# Patient Record
Sex: FEMALE | Race: WHITE
[De-identification: names, ages, dates, MRNs, and addresses within clinical notes are randomized per-mention and may not be internally consistent; named-entity substitution may affect disease eponyms.]

---

## 2017-01-31 ENCOUNTER — HOSPITAL ENCOUNTER (INPATIENT)
Dept: HOSPITAL 10 - MS2 | Age: 82
LOS: 9 days | Discharge: NURSING, REHAB OR LONG-TERM CARE | DRG: 378 | End: 2017-02-09
Attending: INTERNAL MEDICINE | Admitting: INTERNAL MEDICINE
Payer: MEDICARE

## 2017-01-31 VITALS — DIASTOLIC BLOOD PRESSURE: 71 MMHG | RESPIRATION RATE: 18 BRPM | SYSTOLIC BLOOD PRESSURE: 167 MMHG

## 2017-01-31 VITALS
HEIGHT: 64 IN | WEIGHT: 117.95 LBS | BODY MASS INDEX: 20.14 KG/M2 | WEIGHT: 117.95 LBS | BODY MASS INDEX: 20.14 KG/M2 | HEIGHT: 64 IN

## 2017-01-31 VITALS — RESPIRATION RATE: 18 BRPM | SYSTOLIC BLOOD PRESSURE: 130 MMHG | DIASTOLIC BLOOD PRESSURE: 61 MMHG

## 2017-01-31 DIAGNOSIS — E87.6: ICD-10-CM

## 2017-01-31 DIAGNOSIS — J45.909: ICD-10-CM

## 2017-01-31 DIAGNOSIS — M48.56XA: ICD-10-CM

## 2017-01-31 DIAGNOSIS — Z79.01: ICD-10-CM

## 2017-01-31 DIAGNOSIS — K57.21: Primary | ICD-10-CM

## 2017-01-31 DIAGNOSIS — H04.129: ICD-10-CM

## 2017-01-31 DIAGNOSIS — K21.9: ICD-10-CM

## 2017-01-31 DIAGNOSIS — Z95.0: ICD-10-CM

## 2017-01-31 DIAGNOSIS — D64.9: ICD-10-CM

## 2017-01-31 LAB
ADD UMIC: YES
ALBUMIN SERPL-MCNC: 2.9 G/DL (ref 3.3–4.9)
ALBUMIN/GLOB SERPL: 1.03 {RATIO}
ALP SERPL-CCNC: 98 IU/L (ref 42–121)
ALT SERPL-CCNC: 26 IU/L (ref 13–69)
ANION GAP SERPL CALC-SCNC: 12 MMOL/L (ref 8–16)
APTT BLD: 31 SEC (ref 25–35)
AST SERPL-CCNC: 18 IU/L (ref 15–46)
BASOPHILS # BLD AUTO: 0 10^3/UL (ref 0–0.1)
BASOPHILS NFR BLD: 0.5 % (ref 0–2)
BILIRUB DIRECT SERPL-MCNC: 0 MG/DL (ref 0–0.2)
BILIRUB SERPL-MCNC: 0.1 MG/DL (ref 0.2–1.3)
BUN SERPL-MCNC: 8 MG/DL (ref 7–20)
CALCIUM SERPL-MCNC: 9.5 MG/DL (ref 8.4–10.2)
CHLORIDE SERPL-SCNC: 107 MMOL/L (ref 97–110)
CO2 SERPL-SCNC: 28 MMOL/L (ref 21–31)
COLOR UR: (no result)
CONDITION: 1
CREAT SERPL-MCNC: 0.5 MG/DL (ref 0.44–1)
EOSINOPHIL # BLD: 0.2 10^3/UL (ref 0–0.5)
EOSINOPHIL NFR BLD: 2.7 % (ref 0–7)
ERYTHROCYTE [DISTWIDTH] IN BLOOD BY AUTOMATED COUNT: 16.2 % (ref 11.5–14.5)
GLOBULIN SER-MCNC: 2.8 G/DL (ref 1.3–3.2)
GLUCOSE SERPL-MCNC: 73 MG/DL (ref 70–220)
GLUCOSE UR STRIP-MCNC: NEGATIVE %
HCT VFR BLD CALC: 33 % (ref 37–47)
HGB BLD-MCNC: 10.9 G/DL (ref 12–16)
INR PPP: 1.29
KETONES UR STRIP.AUTO-MCNC: 15 MG/DL
LH ANALYZER COMMENTS: 1
LYMPHOCYTES # BLD AUTO: 2.1 10^3/UL (ref 0.8–2.9)
LYMPHOCYTES NFR BLD AUTO: 35.5 % (ref 15–51)
MCH RBC QN AUTO: 29.9 PG (ref 29–33)
MCHC RBC AUTO-ENTMCNC: 32.9 G/DL (ref 32–37)
MCV RBC AUTO: 91 FL (ref 82–101)
MONOCYTES # BLD: 0.5 10^3/UL (ref 0.3–0.9)
MONOCYTES NFR BLD: 8.7 % (ref 0–11)
NEUTROPHILS # BLD: 3.1 10^3/UL (ref 1.6–7.5)
NEUTROPHILS NFR BLD AUTO: 52.6 % (ref 39–77)
NITRITE UR QL STRIP.AUTO: NEGATIVE
NRBC # BLD MANUAL: 0 10^3/UL (ref 0–0)
NRBC BLD QL: 0 /100WBC (ref 0–0)
PLATELET # BLD: 161 10^3/UL (ref 140–440)
PMV BLD AUTO: 8.4 FL (ref 7.4–10.4)
POTASSIUM SERPL-SCNC: 4 MMOL/L (ref 3.5–5.1)
PROT SERPL-MCNC: 5.7 G/DL (ref 6.1–8.1)
PROTHROMBIN TIME: 16.2 SEC (ref 12.2–14.2)
PT RATIO: 1.3
RBC # BLD AUTO: 3.63 10^6/UL (ref 4.2–5.4)
RBC # UR AUTO: NEGATIVE /UL
RBC #/AREA URNS HPF: (no result) /HPF
SODIUM SERPL-SCNC: 143 MMOL/L (ref 135–144)
SQUAMOUS #/AREA URNS HPF: (no result) /[HPF]
URINE BILIRUBIN (DIP): NEGATIVE
URINE TOTAL PROTEIN (DIP): NEGATIVE
UROBILINOGEN UR STRIP-ACNC: (no result) (ref 0.1–1)
WBC # BLD AUTO: 5.9 10^3/UL (ref 4.8–10.8)
WBC # BLD: 5.9 10^3/UL (ref 4.8–10.8)
WBC # UR STRIP: (no result) /UL

## 2017-01-31 PROCEDURE — 97162 PT EVAL MOD COMPLEX 30 MIN: CPT

## 2017-01-31 PROCEDURE — 74176 CT ABD & PELVIS W/O CONTRAST: CPT

## 2017-01-31 PROCEDURE — 71010: CPT

## 2017-01-31 PROCEDURE — 80048 BASIC METABOLIC PNL TOTAL CA: CPT

## 2017-01-31 PROCEDURE — 97116 GAIT TRAINING THERAPY: CPT

## 2017-01-31 PROCEDURE — 85025 COMPLETE CBC W/AUTO DIFF WBC: CPT

## 2017-01-31 PROCEDURE — 82962 GLUCOSE BLOOD TEST: CPT

## 2017-01-31 PROCEDURE — 97530 THERAPEUTIC ACTIVITIES: CPT

## 2017-01-31 PROCEDURE — 80053 COMPREHEN METABOLIC PANEL: CPT

## 2017-01-31 PROCEDURE — 81003 URINALYSIS AUTO W/O SCOPE: CPT

## 2017-01-31 PROCEDURE — 85610 PROTHROMBIN TIME: CPT

## 2017-01-31 PROCEDURE — 83735 ASSAY OF MAGNESIUM: CPT

## 2017-01-31 PROCEDURE — 81001 URINALYSIS AUTO W/SCOPE: CPT

## 2017-01-31 PROCEDURE — 85730 THROMBOPLASTIN TIME PARTIAL: CPT

## 2017-01-31 PROCEDURE — C9113 INJ PANTOPRAZOLE SODIUM, VIA: HCPCS

## 2017-01-31 PROCEDURE — 93005 ELECTROCARDIOGRAM TRACING: CPT

## 2017-01-31 PROCEDURE — 87070 CULTURE OTHR SPECIMN AEROBIC: CPT

## 2017-01-31 PROCEDURE — 80162 ASSAY OF DIGOXIN TOTAL: CPT

## 2017-01-31 RX ADMIN — MORPHINE SULFATE PRN MG: 2 INJECTION, SOLUTION INTRAMUSCULAR; INTRAVENOUS at 22:35

## 2017-01-31 RX ADMIN — MEROPENEM SCH MLS/HR: 1 INJECTION, POWDER, FOR SOLUTION INTRAVENOUS at 22:30

## 2017-01-31 RX ADMIN — MOMETASONE FUROATE SCH PUFF: 220 INHALANT RESPIRATORY (INHALATION) at 21:34

## 2017-01-31 RX ADMIN — DILTIAZEM HYDROCHLORIDE SCH MG: 60 TABLET, FILM COATED ORAL at 22:20

## 2017-01-31 RX ADMIN — DILTIAZEM HYDROCHLORIDE SCH MG: 60 TABLET, FILM COATED ORAL at 18:39

## 2017-01-31 RX ADMIN — ZOLPIDEM TARTRATE PRN MG: 5 TABLET, FILM COATED ORAL at 22:19

## 2017-01-31 RX ADMIN — FOLIC ACID SCH MLS/HR: 5 INJECTION, SOLUTION INTRAMUSCULAR; INTRAVENOUS; SUBCUTANEOUS at 22:31

## 2017-01-31 RX ADMIN — MONTELUKAST SODIUM SCH MG: 10 TABLET, FILM COATED ORAL at 22:19

## 2017-01-31 NOTE — RADRPT
PROCEDURE:   CT Abdomen and Pelvis without contrast. 

 

CLINICAL INDICATION:    Abdominal pain

 

TECHNIQUE:   CT of the abdomen and pelvis was performed on a multi-detector scanner without IV contr
ast.  Coronal and sagittal images were reformatted from the axial data set.  One or more of the foll
owing dose reduction techniques were used: automated exposure control,  adjustment of the mA and/or 
kV according to patient size, use of iterative reconstruction technique.  CTDI = 8.0 mGy. DLP = 414.
63 mGy-cm.

 

COMPARISON:   CT, 04/08/2015 

 

FINDINGS:

 

CT abdomen:

 

There are small to mild bilateral pleural effusions, larger on the left.  The heart size is normal, 
without pericardial effusion.  Cholelithiasis is noted, without evidence for cholecystitis.  Liver, 
biliary tree, pancreas, spleen, adrenal glands and kidneys are unremarkable.  No urolithiasis or obs
tructive uropathy is identified.  The stomach is partially collapsed, but appears grossly unremarkab
le.

 

The aorta is of normal caliber.  Aortic vascular calcifications are present.  There is no retroperit
morales lymphadenopathy.  The efrem hepatis region is clear.  The patient is status post ventral herni
a repair.

 

CT pelvis:

 

Inflammation and extraluminal gas are seen adjacent to sigmoid colon diverticula, compatible with di
verticulitis and localized diverticular perforation.  No evidence of loculated or drainable abscess 
collection is seen at this time.  No upper abdominal pneumoperitoneum is seen.  There is no bowel ob
struction.  The appendix is well visualized and normal.  Urinary bladder is grossly unremarkable.  U
terus is surgically absent.  No pelvic mass, free fluid or lymphadenopathy is identified.

 

The surrounding osseous structures are remarkable for degenerative spondylosis of the spine.  No ost
eolytic or osteoblastic lesion is detected. There are chronic-appearing compression deformities of L
1 and L4, increased when compared to the prior CT. 

 

IMPRESSION:

 

1.  The study is positive for sigmoid colon diverticulitis and localized diverticular perforation, a
s described above.  No loculated or drainable abscess collection is seen at this time.

2.  There are small to mild bilateral pleural effusions, larger on the left, new when compared to th
e prior CT.

3.  Cholelithiasis is seen without evidence for cholecystitis.

4.  Aortoiliac atherosclerotic calcifications are present.

5.  The patient is status post hysterectomy and ventral hernia repair.  

6.  There are chronic-appearing compression deformities of L1 and L4, increased when compared to the
 prior CT.

 

RPTAT: TT

_____________________________________________ 

.Ajit Carranza MD, MD           Date    Time 

Electronically viewed and signed by .Ajit Carranza MD, MD on 01/31/2017 16:43 

 

D:  01/31/2017 16:43  T:  01/31/2017 16:43

.R/

## 2017-01-31 NOTE — HP
DATE OF ADMISSION: 01/31/2017

 

ADMITTING DIAGNOSIS:  Anemia and GI bleed.

 

HISTORY OF PRESENT ILLNESS:  The patient is an 83-year-old female with paroxysmal atrial fibrillatio
n, asthma, GERD who has been in a skilled nursing facility after being diagnosed with a diverticulit
is with perforation at the beginning of January.  The patient was admitted to [facility] on
 01/02 and was found to have a perforation at that time of the sigmoid colon.  Conservative manageme
nt was proposed at that time, and the patient was seen by Surgery, Infectious Disease as well as giv
en antibiotics, and the patient symptomatically improved, and the patient did not require surgery.  
Over the last 2-1/2 weeks, the patient has been in the nursing home and has been getting progressive
ly worse.  The patient has had increasing abdominal pain while on the antibiotics.  The patient also
 had several bloody bowel movements over the last week and half as well with decreasing hematocrit. 
 The patient has felt progressively weak and was transferred to [facility] for [facility] care.

 

REVIEW OF SYSTEMS:  Significant for poor appetite, depression and moderate to severe right-sided bac
k pain with turning in bed as well as with ambulation, standing and transfers.

 

PAST MEDICAL HISTORY:  Paroxysmal atrial fibrillation; moderate persistent asthma; history of osteop
orosis with vertebral compression fracture; recent pneumonia in December; gastroesophageal reflux di
sease with hiatal hernia, status post Nissen fundoplication; dry eye syndrome; chronic allergies wit
h rhinitis; history of retinal detachment.

 

PAST SURGICAL HISTORY:  Status post cataract surgery bilaterally, status post retinal detachment rep
air x2, history of bladder repair surgery, history of hysterectomy, status post permanent pacemaker 
placement for sick sinus syndrome, status post radioablation for aberrant rhythm and status post Nis
sen fundoplication for large hiatal hernia.

 

FAMILY HISTORY:  Noncontributory.

 

ALLERGIES:

1.  CEFUROXIME.

2.  ALL NUTS.

3.  PENICILLIN.

4.  SULFA.

5.  TIGAN. 

6.  ASPIRIN.

7.  IODINE.

8.  LATEX. 

 

SENSITIVITY TO CODEINE AND HYDROMORPHONE BUT NOT ALLERGIC.

 

SOCIAL HISTORY:  The patient is a , lives with her partner but has been in [facility] for reha
bilitation for the last 3 weeks.  No tobacco, no alcohol use.

 

PHYSICAL EXAMINATION:

VITAL SIGNS:  Temperature 98.7, blood pressure 167/71, pulse is 68, respirations 18, oxygen saturati
on 94% on room air.

GENERAL:  Well-developed, well-nourished female, no acute distress, lying in bed.

SKIN:  Diffusely cirrhotic with mild tenting, scattered ecchymoses.

HEENT:  EOMI, PERRLA.  Oropharynx with decreased mucous pooling.

NECK:  No jugular venous distention.  2+ carotid upstroke.  No lymphadenopathy.

CHEST:  Increased expiratory phase, otherwise clear to auscultation bilaterally.

HEART:  Regular rate and rhythm.  No murmurs, gallops or rubs noted.  No ectopy.

ABDOMEN:  Soft, nondistended.  Normoactive bowel sounds.  There is rebound tenderness in the left lo
wer quadrant, but there is mild diffuse tenderness in the upper abdomen as well.  No masses noted.  
There is a ventral incisional hernia in the epigastric area as well as a hernia in the right upper q
uadrant that is reducible.

GENITOURINARY:  Normal female externally.  Internal exam not performed.

EXTREMITIES:  No cyanosis, clubbing or edema.

NEUROLOGIC:  Nonfocal.  

PSYCHIATRIC:  The patient is tearful at times but ____ times.  No suicidal ideation or homicidal laly
ation.  Normal thought content.

 

LABORATORY EXAMINATION:  White blood cell count 5.9, hematocrit of 33.0, hemoglobin of 10.9, platele
ts of 161.  INR of 1.29, PT of 16.2, PTT of 31.  Urinalysis:  Negative nitrite, negative bilirubin, 
trace leukocyte esterase, 0 to 2 red blood cells, 0 to 2 white blood cells.  Sodium is 143, potassiu
m 4.0, chloride 107, bicarbonate 28, BUN of 8, creatinine 0.5, glucose 73, calcium 9.5.  Total bilir
ubin 0.1, direct bilirubin 0.0, AST of 18, ALT of 26, alkaline phosphatase 98, total protein 5.7, al
bumin 2.9.  

 

IMAGING:  CT scan of the abdomen without intravenous contrast shows sigmoid colon diverticulitis wit
h localized diverticular perforation.  No evidence of any loculated adrenal abscess collection is se
en.  No upper abdominal pneumoperitoneum is seen.  No bowel obstruction.  The patient has small to m
ild bilateral pleural effusions, larger on the left.  The patient has cholelithiasis without evidenc
e of cholecystitis.  Otherwise unremarkable.  There are chronic-appearing compression deformities of
 L1 and L4, increased when compared to the prior CT dated 04/08/2015.

 

IMPRESSION:  The patient is an 83-year-old  female with diverticular perforation of the sig
moid colon with abdominal pain as well as recent maroon-colored stool over the past week and a half 
with decreasing hematocrit.  The patient is admitted for further evaluation and care to medical surg
[facility].

1.  Gastrointestinal.  The patient with both diverticular perforation which may have had eroded into
 a blood vessel, causing the bleed.  The patient had been on blood thinners up until 2 days ago, but
 these have been held due to the decreasing hematocrit.  The patient has been on meropenem over the 
last several weeks as it was the antibiotic given at [employer] and was continued while there.  The 
patient will be continued on this for now but will have Infectious Disease see the patient to assist
 with antibiotic treatment in this patient with multiple antibiotic allergies.  Will have Dr. See dawkins Gastroenterology see the patient as well as Dr. Morris from General Surgery to evaluate and josselin
e recommendations based on overall care.  Will give p.r.n. morphine sulfate for pain.  Will give IV 
Zofran for nausea.  Will feed the patient a soft diet for now and change this based on her overall p
rogress.

2.  Anemia.  The patient's blood counts do not reflect the blood levels that were seen yesterday at 
[facility].  The patient may be hemoconcentrated because she was having decreasing hematocrit over 
the past week and not just isolated to yesterday.  The patient also had low platelets as of last nig
ht's blood, but today's platelets are in the normal range.  Will repeat CBC in the morning after hyd
ration and see where the numbers go.  If the patient requires transfusion, will do this as well, but
 at this point she requires no blood products.

3.  Asthma.  Will continue the patient's nebulizer therapy as needed as well as her Asmanex and her 
Singulair.

4.  Paroxysmal atrial fibrillation.  The patient is currently in sinus rhythm.  Will hold off on the
 Coumadin for now as patient has a subacute GI bleed.

5.  Dry eye syndrome.  Will continue with patient's Restasis.

6.  Chronic rhinitis.  Will continue with patient's loratadine as well as Flonase intranasally. 

7.  Peripherally inserted central catheter line care.  The patient has a PICC line in the right uppe
r arm.  This is possibly nonfunctioning.  Will do cath flow analysis in order to see if this can be 
done.  If it cannot be used, will remove it and will have another PICC line placed.

 

 

Dictated By: AMPARO MAST MD

 

SR/NTS

DD:    01/31/2017 20:18:17

DT:    01/31/2017 21:28:28

Conf#: [id number]

DID#:  [id number]

## 2017-02-01 VITALS — DIASTOLIC BLOOD PRESSURE: 70 MMHG | SYSTOLIC BLOOD PRESSURE: 158 MMHG | RESPIRATION RATE: 18 BRPM

## 2017-02-01 VITALS — SYSTOLIC BLOOD PRESSURE: 136 MMHG | RESPIRATION RATE: 18 BRPM | DIASTOLIC BLOOD PRESSURE: 63 MMHG

## 2017-02-01 LAB
ALBUMIN SERPL-MCNC: 2.7 G/DL (ref 3.3–4.9)
ALBUMIN/GLOB SERPL: 1.03 {RATIO}
ALP SERPL-CCNC: 94 IU/L (ref 42–121)
ALT SERPL-CCNC: 27 IU/L (ref 13–69)
ANION GAP SERPL CALC-SCNC: 12 MMOL/L (ref 8–16)
AST SERPL-CCNC: 18 IU/L (ref 15–46)
BASOPHILS # BLD AUTO: 0 10^3/UL (ref 0–0.1)
BASOPHILS NFR BLD: 0.7 % (ref 0–2)
BILIRUB DIRECT SERPL-MCNC: 0 MG/DL (ref 0–0.2)
BILIRUB SERPL-MCNC: 0.1 MG/DL (ref 0.2–1.3)
BUN SERPL-MCNC: 5 MG/DL (ref 7–20)
CALCIUM SERPL-MCNC: 9.1 MG/DL (ref 8.4–10.2)
CHLORIDE SERPL-SCNC: 109 MMOL/L (ref 97–110)
CO2 SERPL-SCNC: 27 MMOL/L (ref 21–31)
CONDITION: 1
CREAT SERPL-MCNC: 0.4 MG/DL (ref 0.44–1)
EOSINOPHIL # BLD: 0.2 10^3/UL (ref 0–0.5)
EOSINOPHIL NFR BLD: 2.8 % (ref 0–7)
ERYTHROCYTE [DISTWIDTH] IN BLOOD BY AUTOMATED COUNT: 16.3 % (ref 11.5–14.5)
GLOBULIN SER-MCNC: 2.6 G/DL (ref 1.3–3.2)
GLUCOSE SERPL-MCNC: 67 MG/DL (ref 70–220)
HCT VFR BLD CALC: 30.4 % (ref 37–47)
HGB BLD-MCNC: 10.2 G/DL (ref 12–16)
INR PPP: 1.14
LH ANALYZER COMMENTS: 1
LYMPHOCYTES # BLD AUTO: 2.5 10^3/UL (ref 0.8–2.9)
LYMPHOCYTES NFR BLD AUTO: 46 % (ref 15–51)
MAGNESIUM SERPL-MCNC: 2 MG/DL (ref 1.7–2.5)
MCH RBC QN AUTO: 30.2 PG (ref 29–33)
MCHC RBC AUTO-ENTMCNC: 33.5 G/DL (ref 32–37)
MCV RBC AUTO: 90.3 FL (ref 82–101)
MONOCYTES # BLD: 0.4 10^3/UL (ref 0.3–0.9)
MONOCYTES NFR BLD: 7.8 % (ref 0–11)
NEUTROPHILS # BLD: 2.3 10^3/UL (ref 1.6–7.5)
NEUTROPHILS NFR BLD AUTO: 42.7 % (ref 39–77)
NRBC # BLD MANUAL: 0 10^3/UL (ref 0–0)
NRBC BLD QL: 0 /100WBC (ref 0–0)
PLATELET # BLD: 157 10^3/UL (ref 140–440)
PMV BLD AUTO: 8.7 FL (ref 7.4–10.4)
POTASSIUM SERPL-SCNC: 3.9 MMOL/L (ref 3.5–5.1)
PROT SERPL-MCNC: 5.3 G/DL (ref 6.1–8.1)
PROTHROMBIN TIME: 14.6 SEC (ref 12.2–14.2)
PT RATIO: 1.1
RBC # BLD AUTO: 3.37 10^6/UL (ref 4.2–5.4)
SODIUM SERPL-SCNC: 144 MMOL/L (ref 135–144)
WBC # BLD AUTO: 5.5 10^3/UL (ref 4.8–10.8)
WBC # BLD: 5.5 10^3/UL (ref 4.8–10.8)

## 2017-02-01 RX ADMIN — MORPHINE SULFATE PRN MG: 2 INJECTION, SOLUTION INTRAMUSCULAR; INTRAVENOUS at 08:10

## 2017-02-01 RX ADMIN — DIGOXIN SCH MG: 125 TABLET ORAL at 12:35

## 2017-02-01 RX ADMIN — MAGNESIUM OXIDE TAB 400 MG (241.3 MG ELEMENTAL MG) SCH MG: 400 (241.3 MG) TAB at 08:10

## 2017-02-01 RX ADMIN — MEROPENEM SCH MLS/HR: 1 INJECTION, POWDER, FOR SOLUTION INTRAVENOUS at 21:43

## 2017-02-01 RX ADMIN — FOLIC ACID SCH MLS/HR: 5 INJECTION, SOLUTION INTRAMUSCULAR; INTRAVENOUS; SUBCUTANEOUS at 06:25

## 2017-02-01 RX ADMIN — FOLIC ACID SCH MLS/HR: 5 INJECTION, SOLUTION INTRAMUSCULAR; INTRAVENOUS; SUBCUTANEOUS at 15:04

## 2017-02-01 RX ADMIN — DEXAMETHASONE SODIUM PHOSPHATE PRN MG: 10 INJECTION, SOLUTION INTRAMUSCULAR; INTRAVENOUS at 15:19

## 2017-02-01 RX ADMIN — MONTELUKAST SODIUM SCH MG: 10 TABLET, FILM COATED ORAL at 21:00

## 2017-02-01 RX ADMIN — ZOLPIDEM TARTRATE PRN MG: 5 TABLET, FILM COATED ORAL at 21:56

## 2017-02-01 RX ADMIN — DILTIAZEM HYDROCHLORIDE SCH MG: 60 TABLET, FILM COATED ORAL at 21:44

## 2017-02-01 RX ADMIN — MOMETASONE FUROATE SCH PUFF: 220 INHALANT RESPIRATORY (INHALATION) at 08:11

## 2017-02-01 RX ADMIN — FOLIC ACID SCH MLS/HR: 5 INJECTION, SOLUTION INTRAMUSCULAR; INTRAVENOUS; SUBCUTANEOUS at 12:00

## 2017-02-01 RX ADMIN — MEROPENEM SCH MLS/HR: 1 INJECTION, POWDER, FOR SOLUTION INTRAVENOUS at 08:08

## 2017-02-01 RX ADMIN — DILTIAZEM HYDROCHLORIDE SCH MG: 60 TABLET, FILM COATED ORAL at 08:10

## 2017-02-01 RX ADMIN — PANTOPRAZOLE SODIUM SCH MG: 40 INJECTION, POWDER, FOR SOLUTION INTRAVENOUS at 17:13

## 2017-02-01 RX ADMIN — FOLIC ACID SCH MLS/HR: 5 INJECTION, SOLUTION INTRAMUSCULAR; INTRAVENOUS; SUBCUTANEOUS at 23:48

## 2017-02-01 RX ADMIN — MOMETASONE FUROATE SCH PUFF: 220 INHALANT RESPIRATORY (INHALATION) at 21:43

## 2017-02-01 RX ADMIN — LORATADINE SCH MG: 10 TABLET ORAL at 08:10

## 2017-02-01 RX ADMIN — DILTIAZEM HYDROCHLORIDE SCH MG: 60 TABLET, FILM COATED ORAL at 17:01

## 2017-02-01 RX ADMIN — DILTIAZEM HYDROCHLORIDE SCH MG: 60 TABLET, FILM COATED ORAL at 12:35

## 2017-02-01 RX ADMIN — MONTELUKAST SODIUM SCH MG: 10 TABLET, FILM COATED ORAL at 21:43

## 2017-02-01 RX ADMIN — MORPHINE SULFATE PRN MG: 2 INJECTION, SOLUTION INTRAMUSCULAR; INTRAVENOUS at 15:20

## 2017-02-01 NOTE — CONS
Date/Time of Note


Date/Time of Note


DATE: 2/1/17 


TIME: 09:20





Assessment/Plan


Assessment/Plan


Chief Complaint/Hosp Course





1)diverticulitis with contained perforation


no drainable abscess


pt has been on almost 4 weeks of antibiotics, doubtful that antibiotics alone 

will be curative


no evidence to suggest that she has failed antibiotics due to resistance to 

current antibiotic (no F, C, NS, elevated wbc)


continue with merrem


if patient develops fever or condition worsens then would consider adding vanco 

for enterococcal coverage, merrem has great anaerobic coverage


await surgical input for definitive treatment





2) multiple drug allergies


thankfully patient is tolerating the merrem





3) paroxysmal a-fib with pacer


pt has been on coumadin but not currently and INR is good


Problems:  





Consultation Date/Type/Reason


Admit Date/Time


Jan 31, 2017 at 15:04


Date of Consultation:  Feb 1, 2017


Type of Consultation:  ID





Hx of Present Illness


pt was admited at [facility] in early january and found to have a 

contained diverticular perforation due to diverticulitis


she has been on merrem since then.  Pt states she initially had fevers, abd 

pain and chills which got better at [facility].


She was transferred to SNF and continue with antibiotics.  In the last 7-10 

days her abd pain has returned and she has occasional chills and fevers


over this past weekend she had 3 bloody stools but she reports maroon stools 

prior to that.


No SOB, cough, sore throat.  She has occasional HA and has coryza of yellow 

phlegm


No dysuria, rashes.  She had pain to back and R hip that comes with her abd 

pain.





Past Medical History


paroxysmal a-fib, retinal detachemnt, GERD, vertebral compression fx, hiatal 

hernia, asthma





Past Surgical History


pacer, bladder surgery, hysterectomy, cataract surgery, fundoplication for 

hiatal hernia





Social History


Smoking Status:  Never smoker





Exam/Review of Systems


Vital Signs


Vitals





 Vital Signs








  Date Time  Temp Pulse Resp B/P Pulse Ox O2 Delivery O2 Flow Rate FiO2


 


2/1/17 07:52 98.2 70 18 158/70 95   














 Intake and Output   


 


 1/31/17 1/31/17 2/1/17





 15:00 23:00 07:00


 


Intake Total  100 ml 1480 ml


 


Output Total   1100 ml


 


Balance  100 ml 380 ml











Exam


Constitutional:  alert, oriented


Head:  normocephalic


Eyes:  nl conjunctiva, nl sclera


ENMT:  mucosa pink and moist


Neck:  supple


Respiratory:  clear to auscultation


Cardiovascular:  irregular rhythm


Gastrointestinal:  other (tender to RLQ and LLQ), soft


Musculoskeletal:  nl extremities to inspection


Neurological:  other (moves all extremities)





Results


Result Diagram:  


2/1/17 0555                                                                    

            2/1/17 0555





Results 24 hrs





Laboratory Tests








Test


  1/31/17


17:28 1/31/17


18:00 2/1/17


05:35 2/1/17


05:55


 


Activated Partial


Thromboplast Time 31.0  


  


  


  


 


 


Alanine Aminotransferase


(ALT/SGPT) 26  


  


  


  27  


 


 


Albumin 2.9  L   2.7  L


 


Albumin/Globulin Ratio 1.03     1.03  


 


Alkaline Phosphatase 98     94  


 


Anion Gap 12     12  


 


Aspartate Amino Transf


(AST/SGOT) 18  


  


  


  18  


 


 


Basophils # 0.0     0.0  


 


Basophils % 0.5     0.7  


 


Blood Morphology Comment        


 


Blood Urea Nitrogen 8     5  L


 


Calcium Level 9.5     9.1  


 


Carbon Dioxide Level 28     27  


 


Chloride Level 107     109  


 


Creatinine 0.50     0.40  L


 


Direct Bilirubin 0.00     0.00  


 


Eosinophils # 0.2     0.2  


 


Eosinophils % 2.7     2.8  


 


Globulin 2.80     2.60  


 


Glucose Level 73     67  L


 


Hematocrit 33.0  L   30.4  L


 


Hemoglobin 10.9  L   10.2  L


 


INR International Normalized


Ratio 1.29  


  


  


  1.14  


 


 


Indirect Bilirubin 0.1     0.1  


 


Lymphocytes # 2.1     2.5  


 


Lymphocytes % 35.5     46.0  


 


Mean Corpuscular Hemoglobin 29.9     30.2  


 


Mean Corpuscular Hemoglobin


Concent 32.9  


  


  


  33.5  


 


 


Mean Corpuscular Volume 91.0     90.3  


 


Mean Platelet Volume 8.4     8.7  


 


Monocytes # 0.5     0.4  


 


Monocytes % 8.7     7.8  


 


Neutrophils # 3.1     2.3  


 


Neutrophils % 52.6     42.7  


 


Nucleated Red Blood Cells # 0.0     0.0  


 


Nucleated Red Blood Cells % 0.0     0.0  


 


Platelet Count 161     157  


 


Potassium Level 4.0     3.9  


 


Prothrombin Time 16.2  H   14.6  H


 


Prothrombin Time Ratio 1.3     1.1  


 


Red Blood Count 3.63  L   3.37  L


 


Red Cell Distribution Width 16.2  H   16.3  H


 


Sodium Level 143     144  


 


Total Bilirubin 0.1  L   0.1  L


 


Total Protein 5.7  L   5.3  L


 


White Blood Count 5.9     5.5  


 


Urine Bilirubin  NEGATIVE    


 


Urine Clarity  CLEAR    


 


Urine Color  LT. YELLOW    


 


Urine Glucose  NEGATIVE    


 


Urine Hemoglobin  NEGATIVE    


 


Urine Ketones  15    


 


Urine Leukocyte Esterase  TRACE  H  


 


Urine Microscopic RBC  0-2    


 


Urine Microscopic WBC  0-2    


 


Urine Nitrite  NEGATIVE    


 


Urine Specific Gravity  1.015    


 


Urine Squamous Epithelial


Cells 


  FEW  


  


  


 


 


Urine Total Protein  NEGATIVE    


 


Urine Urobilinogen  0.2  E.U./dL    


 


Urine pH  7.5    


 


Digoxin Level   0.7  L 


 


Magnesium Level    2.0  











Medications


Medications





 Current Medications


Meropenem (Merrem 1 Gm/100 ml (Pmx)) 100 ml @  200 mls/hr Q12 IVPB  Last 

administered on 2/1/17at 08:08; Admin Dose 200 MLS/HR;  Start 1/31/17 at 21:00


Morphine Sulfate (morphine) 2 mg Q3H  PRN IV PAIN LEVEL 6-10 Last administered 

on 2/1/17at 08:10; Admin Dose 2 MG;  Start 1/31/17 at 16:00


Mometasone Furoate (Asmanex) 1 puff BID INH  Last administered on 2/1/17at 08:11

; Admin Dose 1 PUFF;  Start 1/31/17 at 21:00


Montelukast Sodium (Singulair) 10 mg HS PO  Last administered on 1/31/17at 22:19

; Admin Dose 10 MG;  Start 1/31/17 at 21:00


Cyclosporine (Restasis) 1 drop BID BOTH EYES  Last administered on 2/1/17at 08:

10; Admin Dose 1 DROP;  Start 1/31/17 at 21:00


Diltiazem HCl 60 mg 60 mg QID PO  Last administered on 2/1/17at 08:10; Admin 

Dose 60 MG;  Start 1/31/17 at 17:00


Sodium Chloride (NS) 1,000 ml @  125 mls/hr Q8H IV  Last administered on 2/1/ 17at 06:25; Admin Dose 125 MLS/HR;  Start 1/31/17 at 20:00


Fluticasone Propionate (Flonase 0.05% Nasal) 1 spray BID NASAL  Last 

administered on 2/1/17at 08:09; Admin Dose 1 SPRAY;  Start 1/31/17 at 21:00


Acetaminophen (Tylenol Liquid) 650 mg Q4H  PRN GTB PAIN AND OR ELEVATED TEMP;  

Start 1/31/17 at 20:00


Magnesium Oxide (Mag-Ox 400) 400 mg DAILY PO  Last administered on 2/1/17at 08:

10; Admin Dose 400 MG;  Start 2/1/17 at 09:00


Ondansetron HCl (Zofran Inj) 4 mg Q4H  PRN IV NAUSEA AND/OR VOMITING;  Start 1/ 31/17 at 20:00


Loratadine (Claritin) 10 mg DAILY PO  Last administered on 2/1/17at 08:10; 

Admin Dose 10 MG;  Start 2/1/17 at 09:00


Zolpidem Tartrate (Ambien) 5 mg HS  PRN PO INSOMNIA Last administered on 1/31/ 17at 22:19; Admin Dose 5 MG;  Start 1/31/17 at 20:00


Digoxin (Digoxin) 0.125 mg DAILY@13 PO ;  Start 2/1/17 at 13:00


Pantoprazole (Protonix Iv) 40 mg BID@06,18 IV ;  Start 2/1/17 at 18:00











PHUC COOLEY MD Feb 1, 2017 09:30

## 2017-02-01 NOTE — CONS
Date/Time of Note


Date/Time of Note


DATE: 2/1/17 


TIME: 15:15





Assessment/Plan


Assessment/Plan


Additional Assessment/Plan


Preoperative cardiac risk stratification


Diverticulitis perforation


Preserved ejection fraction


Paroxysmal atrial fibrillation


History of pacemaker





-Patient with recent echocardiogram performed in early January at [facility] with preserved ejection fraction, no significant valvular 

pathology. ECG without any significant ischemic abnormalities and patient 

denies exertional chest pain or shortness of breath. Given her cardiac risk 

factors, she is at intermediate risk for any untoward cardiac events for 

abdominal surgery. No further cardiac evaluation needed at the current time 

prior to surgery.





Consultation Date/Type/Reason


Admit Date/Time


Jan 31, 2017 at 15:04


Type of Consultation:  cv


Reason for Consultation


Preoperative cardiac risk stratification





Hx of Present Illness


This is an 83-year-old female with past medical history of atrial flutter 

status post ablation, paroxysmal atrial fibrillation with history of pacemaker 

placement presents with worsening abdominal pain. Patient with known 

diverticulitis perforation. She is undergoing evaluation for possible abdominal 

surgery.


She denies exertional chest pain or shortness of breath. She is able to walk 

the hallways at her nursing facility where she has been without shortness of 

breath or chest pain. She has an weak over the past few months because of 

multiple hospital visits. She denies any dizziness or lightheadedness. She does 

get occasional palpitations usually with her atrial fibrillation.


12 point review of systems was performed with all pertinent positives and 

negatives mentioned above and all else is negative





Past Medical History


Atrial fibrillation, paroxysmal


Medical History:  hypertension





Past Surgical History


History of pacemaker





Family History


Significant Family History:  no pertinent family hx





Social History


Smoking Status:  Never smoker


Other Social History


Recently from nursing facility





Exam/Review of Systems


Vital Signs


Vitals





 Vital Signs








  Date Time  Temp Pulse Resp B/P Pulse Ox O2 Delivery O2 Flow Rate FiO2


 


2/1/17 07:52 98.2 70 18 158/70 95   














 Intake and Output   


 


 1/31/17 1/31/17 2/1/17





 15:00 23:00 07:00


 


Intake Total  100 ml 1480 ml


 


Output Total   1100 ml


 


Balance  100 ml 380 ml











Exam


Sitting in chair, no apparent distress


Constitutional:  alert, frail, oriented


Head:  normocephalic


Neck:  supple


Respiratory:  other (course breath sounds bilaterally, no wheezing)


Cardiovascular:  other (S1 and S2 heard), regular rate and rhythm


Gastrointestinal:  bowel sounds, non-tender, soft


Extremities:  other (no edema)





Results


Result Diagram:  


2/1/17 0555                                                                    

            2/1/17 0555





Results 24 hrs





Laboratory Tests








Test


  1/31/17


17:28 1/31/17


18:00 2/1/17


05:35 2/1/17


05:55


 


Activated Partial


Thromboplast Time 31.0  


  


  


  


 


 


Alanine Aminotransferase


(ALT/SGPT) 26  


  


  


  27  


 


 


Albumin 2.9  L   2.7  L


 


Albumin/Globulin Ratio 1.03     1.03  


 


Alkaline Phosphatase 98     94  


 


Anion Gap 12     12  


 


Aspartate Amino Transf


(AST/SGOT) 18  


  


  


  18  


 


 


Basophils # 0.0     0.0  


 


Basophils % 0.5     0.7  


 


Blood Morphology Comment        


 


Blood Urea Nitrogen 8     5  L


 


Calcium Level 9.5     9.1  


 


Carbon Dioxide Level 28     27  


 


Chloride Level 107     109  


 


Creatinine 0.50     0.40  L


 


Direct Bilirubin 0.00     0.00  


 


Eosinophils # 0.2     0.2  


 


Eosinophils % 2.7     2.8  


 


Globulin 2.80     2.60  


 


Glucose Level 73     67  L


 


Hematocrit 33.0  L   30.4  L


 


Hemoglobin 10.9  L   10.2  L


 


INR International Normalized


Ratio 1.29  


  


  


  1.14  


 


 


Indirect Bilirubin 0.1     0.1  


 


Lymphocytes # 2.1     2.5  


 


Lymphocytes % 35.5     46.0  


 


Mean Corpuscular Hemoglobin 29.9     30.2  


 


Mean Corpuscular Hemoglobin


Concent 32.9  


  


  


  33.5  


 


 


Mean Corpuscular Volume 91.0     90.3  


 


Mean Platelet Volume 8.4     8.7  


 


Monocytes # 0.5     0.4  


 


Monocytes % 8.7     7.8  


 


Neutrophils # 3.1     2.3  


 


Neutrophils % 52.6     42.7  


 


Nucleated Red Blood Cells # 0.0     0.0  


 


Nucleated Red Blood Cells % 0.0     0.0  


 


Platelet Count 161     157  


 


Potassium Level 4.0     3.9  


 


Prothrombin Time 16.2  H   14.6  H


 


Prothrombin Time Ratio 1.3     1.1  


 


Red Blood Count 3.63  L   3.37  L


 


Red Cell Distribution Width 16.2  H   16.3  H


 


Sodium Level 143     144  


 


Total Bilirubin 0.1  L   0.1  L


 


Total Protein 5.7  L   5.3  L


 


White Blood Count 5.9     5.5  


 


Urine Bilirubin  NEGATIVE    


 


Urine Clarity  CLEAR    


 


Urine Color  LT. YELLOW    


 


Urine Glucose  NEGATIVE    


 


Urine Hemoglobin  NEGATIVE    


 


Urine Ketones  15    


 


Urine Leukocyte Esterase  TRACE  H  


 


Urine Microscopic RBC  0-2    


 


Urine Microscopic WBC  0-2    


 


Urine Nitrite  NEGATIVE    


 


Urine Specific Gravity  1.015    


 


Urine Squamous Epithelial


Cells 


  FEW  


  


  


 


 


Urine Total Protein  NEGATIVE    


 


Urine Urobilinogen  0.2  E.U./dL    


 


Urine pH  7.5    


 


Digoxin Level   0.7  L 


 


Magnesium Level    2.0  











Medications


Medications





 Current Medications


Meropenem (Merrem 1 Gm/100 ml (Pmx)) 100 ml @  200 mls/hr Q12 IVPB  Last 

administered on 2/1/17at 08:08; Admin Dose 200 MLS/HR;  Start 1/31/17 at 21:00


Morphine Sulfate (morphine) 2 mg Q3H  PRN IV PAIN LEVEL 6-10 Last administered 

on 2/1/17at 08:10; Admin Dose 2 MG;  Start 1/31/17 at 16:00


Mometasone Furoate (Asmanex) 1 puff BID INH  Last administered on 2/1/17at 08:11

; Admin Dose 1 PUFF;  Start 1/31/17 at 21:00


Montelukast Sodium (Singulair) 10 mg HS PO  Last administered on 1/31/17at 22:19

; Admin Dose 10 MG;  Start 1/31/17 at 21:00


Cyclosporine (Restasis) 1 drop BID BOTH EYES  Last administered on 2/1/17at 08:

10; Admin Dose 1 DROP;  Start 1/31/17 at 21:00


Diltiazem HCl 60 mg 60 mg QID PO  Last administered on 2/1/17at 12:35; Admin 

Dose 60 MG;  Start 1/31/17 at 17:00


Sodium Chloride (NS) 1,000 ml @  125 mls/hr Q8H IV  Last administered on 2/1/ 17at 15:04; Admin Dose 125 MLS/HR;  Start 1/31/17 at 20:00


Fluticasone Propionate (Flonase 0.05% Nasal) 1 spray BID NASAL  Last 

administered on 2/1/17at 08:09; Admin Dose 1 SPRAY;  Start 1/31/17 at 21:00


Acetaminophen (Tylenol Liquid) 650 mg Q4H  PRN GTB PAIN AND OR ELEVATED TEMP;  

Start 1/31/17 at 20:00


Magnesium Oxide (Mag-Ox 400) 400 mg DAILY PO  Last administered on 2/1/17at 08:

10; Admin Dose 400 MG;  Start 2/1/17 at 09:00


Ondansetron HCl (Zofran Inj) 4 mg Q4H  PRN IV NAUSEA AND/OR VOMITING;  Start 1/ 31/17 at 20:00


Loratadine (Claritin) 10 mg DAILY PO  Last administered on 2/1/17at 08:10; 

Admin Dose 10 MG;  Start 2/1/17 at 09:00


Zolpidem Tartrate (Ambien) 5 mg HS  PRN PO INSOMNIA Last administered on 1/31/ 17at 22:19; Admin Dose 5 MG;  Start 1/31/17 at 20:00


Digoxin (Digoxin) 0.125 mg DAILY@13 PO  Last administered on 2/1/17at 12:35; 

Admin Dose 0.125 MG;  Start 2/1/17 at 13:00


Pantoprazole (Protonix Iv) 40 mg BID@06,18 IV ;  Start 2/1/17 at 18:00





Procedures


Procedures


ECG demonstrates a paced at 65 bpm, QRS 80 ms, nonspecific STT wave 

abnormalities











Sourav Goldman DO Feb 1, 2017 15:22

## 2017-02-01 NOTE — CONS
Date/Time of Note


Date/Time of Note


DATE: 2/1/17 


TIME: 23:22





Assessment/Plan


Assessment/Plan


Chief Complaint/Hosp Course


1. Microperforated diverticulitis, ? continued leak vs already sealed (old 

residual).  Had a long d/w pt regarding options.  I recommended surgery since ? 

re-perforated.  I also advised that higher change for placement of colostomy 

and possible conversion to open.  Patient is vehemently apposing placing 

colostomy.


-iv abx


-npo x meds


-will obtain rectal enema (gg) contrast study to evaluate if continual leak


2. Anemia without evidence of acute blood loss


-Monitor


3. Paroxysmal atrial fibrillation currently in sinus.


-Optimize electrolytes


4. GERD


-Diet and lifestyle optimization


-PPI


5. Asthma


-Medical optimization


6. Hypertension


-Nutrition and medication optimization


7. Back pain with Vertebral compression fracture


-Physical therapy


-Pain control


8. History of sick sinus syndrome status post pacemaker


-Cardiac optimization





Thank you very much for consulting me in this patient's care


Problems:  





Consultation Date/Type/Reason


Admit Date/Time


Jan 31, 2017 at 15:04


Date of Consultation:  Feb 1, 2017


Type of Consultation:  Gen Surgical


Reason for Consultation


Microperforated diverticulitis


Abdominal pain


Referring Provider:  AMPARO MAST MD-





Hx of Present Illness


Erika Jenkins is an 83-year-old female with multiple significant comorbidities 

who was found to have microperforated diverticulitis at [place] in beginning 

of January. Patient was treated with antibiotics and discharged to a nursing 

home. She most recently had increasing abdominal pain and back pain. She 

reports weakness. No fevers or chills. No chest pain or shortness of breath. No 

abdominal bloating. No visual or neurologic changes. No dysuria. No vaginal 

discharge. She's been passing flatus. No trauma or sick contacts. Patient was 

brought back to the hospital and labs are within normal in terms of her white 

count. However CT shows the microperforated diverticulitis. There is 

inflammatory changes around the area. Surgical consult is obtained further 

evaluation and treatment. However patient is very against surgery if there is 

higher risk of colostomy.


Constitutional:  poor po, 


   No chills, No diaphoresis, No febrile


Eyes:  No redness, No visual change


ENT:  No bleeding, No dysphagia


Respiratory:  No cough, No shortness of breath


Cardiovascular:  No chest pain, No edema, No palpitations


Gastrointestinal:  nausea, 


   No passing stool, No vomiting


Genitourinary:  No bleeding, No dysuria, No flank pain


Musculoskeletal:  back pain, 


   No bone/joint pain, No restricted range of motion


Skin:  No bruising, No pruritis, No rash


Neurologic:  No confusion, No headache, No syncope


Endocrine:  No polydypsia, No polyuria


Lymphatic:  No adenopathy, No tender nodes


Psychological:  anxiety, 


   No confusion


Immunologic:  No pruritis, No rhinitis





Past Medical History


HTN


GERD


Asthma


Microperforated diverticulitis


Hiatal hernia


Paroxysmal atrial fibrillation


Osteoporosis 


Vertebral compression fracture


Back pain


Recent pneumonia in December


Dry eye syndrome


Chronic allergies with rhinitis


History of retinal detachment


History of sick sinus syndrome





Past Surgical History


Cataract surgery bilaterally


Retinal detachment repair x2


Bladder repair surgery


Hysterectomy


Permanent pacemaker placement


Radioablation for aberrant rhythm 


Nissen fundoplication for large hiatal hernia





Family History


Significant Family History:  no pertinent family hx





Social History


The patient is a , lives with her partner but has been in [facility] for 

rehabilitation for the last 3 weeks.


Alcohol Use:  none


Smoking Status:  Never smoker


Drug Use:  none





Exam/Review of Systems


Vital Signs


Vitals





 Vital Signs








  Date Time  Temp Pulse Resp B/P Pulse Ox O2 Delivery O2 Flow Rate FiO2


 


2/1/17 20:26 98.3 65 18 136/63 91   














 Intake and Output   


 


 1/31/17 1/31/17 2/1/17





 15:00 23:00 07:00


 


Intake Total  100 ml 1480 ml


 


Output Total   1100 ml


 


Balance  100 ml 380 ml











Exam


Constitutional:  alert, oriented, 


   No distress


Psych:  anxiety, 


   No confusion


Head:  atraumatic, normocephalic


Eyes:  EOMI, PERRL, nl conjunctiva, 


   No icteric


ENMT:  mucosa pink and moist, nl external ears & nose, nl lips & teeth


Neck:  non-tender, 


   No jvd


Respiratory:  normal air movement, 


   No congested cough, No labored breathing


Cardiovascular:  regular rate and rhythm, 


   No edema


Gastrointestinal:  soft, tender (left lower quadrant), 


   No rebound or guarding


Musculoskeletal:  nl extremities to inspection, nl gait and stance, 


   No joint tenderness


Extremities:  normal pulses, 


   No calf tenderness, No cyanosis, No edema


Neurological:  nl mental status, nl speech, nl strength


Skin:  nl turgor, 


   No diaphoresis, No rash or lesions


Lymph:  nl lymph nodes





Results


Result Diagram:  


2/1/17 0555                                                                    

            2/1/17 0555





Results 24 hrs





Laboratory Tests








Test


  2/1/17


05:35 2/1/17


05:55 2/1/17


15:29 2/1/17


17:13


 


Digoxin Level 0.7  L   


 


Alanine Aminotransferase


(ALT/SGPT) 


  27  


  


  


 


 


Albumin  2.7  L  


 


Albumin/Globulin Ratio  1.03    


 


Alkaline Phosphatase  94    


 


Anion Gap  12    


 


Aspartate Amino Transf


(AST/SGOT) 


  18  


  


  


 


 


Basophils #  0.0    


 


Basophils %  0.7    


 


Blood Morphology Comment      


 


Blood Urea Nitrogen  5  L  


 


Calcium Level  9.1    


 


Carbon Dioxide Level  27    


 


Chloride Level  109    


 


Creatinine  0.40  L  


 


Direct Bilirubin  0.00    


 


Eosinophils #  0.2    


 


Eosinophils %  2.8    


 


Globulin  2.60    


 


Glucose Level  67  L  


 


Hematocrit  30.4  L  


 


Hemoglobin  10.2  L  


 


INR International Normalized


Ratio 


  1.14  


  


  


 


 


Indirect Bilirubin  0.1    


 


Lymphocytes #  2.5    


 


Lymphocytes %  46.0    


 


Magnesium Level  2.0    


 


Mean Corpuscular Hemoglobin  30.2    


 


Mean Corpuscular Hemoglobin


Concent 


  33.5  


  


  


 


 


Mean Corpuscular Volume  90.3    


 


Mean Platelet Volume  8.7    


 


Monocytes #  0.4    


 


Monocytes %  7.8    


 


Neutrophils #  2.3    


 


Neutrophils %  42.7    


 


Nucleated Red Blood Cells #  0.0    


 


Nucleated Red Blood Cells %  0.0    


 


Platelet Count  157    


 


Potassium Level  3.9    


 


Prothrombin Time  14.6  H  


 


Prothrombin Time Ratio  1.1    


 


Red Blood Count  3.37  L  


 


Red Cell Distribution Width  16.3  H  


 


Sodium Level  144    


 


Total Bilirubin  0.1  L  


 


Total Protein  5.3  L  


 


White Blood Count  5.5    


 


Bedside Glucose   68  L 65  L














Test


  2/1/17


18:46 


  


  


 


 


Bedside Glucose 122     











Medications


Medications





 Current Medications


Meropenem (Merrem 1 Gm/100 ml (Pmx)) 100 ml @  200 mls/hr Q12 IVPB  Last 

administered on 2/1/17at 21:43; Admin Dose 200 MLS/HR;  Start 1/31/17 at 21:00


Morphine Sulfate (morphine) 2 mg Q3H  PRN IV PAIN LEVEL 6-10 Last administered 

on 2/1/17at 15:20; Admin Dose 2 MG;  Start 1/31/17 at 16:00


Mometasone Furoate (Asmanex) 1 puff BID INH  Last administered on 2/1/17at 21:43

; Admin Dose 1 PUFF;  Start 1/31/17 at 21:00


Montelukast Sodium (Singulair) 10 mg HS PO  Last administered on 1/31/17at 22:19

; Admin Dose 10 MG;  Start 1/31/17 at 21:00


Cyclosporine (Restasis) 1 drop BID BOTH EYES  Last administered on 2/1/17at 08:

10; Admin Dose 1 DROP;  Start 1/31/17 at 21:00


Diltiazem HCl 60 mg 60 mg QID PO  Last administered on 2/1/17at 21:44; Admin 

Dose 60 MG;  Start 1/31/17 at 17:00


Sodium Chloride (NS) 1,000 ml @  125 mls/hr Q8H IV  Last administered on 2/1/ 17at 15:04; Admin Dose 125 MLS/HR;  Start 1/31/17 at 20:00


Fluticasone Propionate (Flonase 0.05% Nasal) 1 spray BID NASAL  Last 

administered on 2/1/17at 21:43; Admin Dose 1 SPRAY;  Start 1/31/17 at 21:00


Acetaminophen (Tylenol Liquid) 650 mg Q4H  PRN GTB PAIN AND OR ELEVATED TEMP;  

Start 1/31/17 at 20:00


Magnesium Oxide (Mag-Ox 400) 400 mg DAILY PO  Last administered on 2/1/17at 08:

10; Admin Dose 400 MG;  Start 2/1/17 at 09:00


Ondansetron HCl (Zofran Inj) 4 mg Q4H  PRN IV NAUSEA AND/OR VOMITING Last 

administered on 2/1/17at 15:19; Admin Dose 4 MG;  Start 1/31/17 at 20:00


Loratadine (Claritin) 10 mg DAILY PO  Last administered on 2/1/17at 08:10; 

Admin Dose 10 MG;  Start 2/1/17 at 09:00


Zolpidem Tartrate (Ambien) 5 mg HS  PRN PO INSOMNIA Last administered on 2/1/ 17at 21:56; Admin Dose 5 MG;  Start 1/31/17 at 20:00


Digoxin (Digoxin) 0.125 mg DAILY@13 PO  Last administered on 2/1/17at 12:35; 

Admin Dose 0.125 MG;  Start 2/1/17 at 13:00


Pantoprazole (Protonix Iv) 40 mg BID@06,18 IV  Last administered on 2/1/17at 17:

13; Admin Dose 40 MG;  Start 2/1/17 at 18:00


Hydralazine HCl (Apresoline) 10 mg Q6H  PRN IV ELEVATED BP;  Start 2/1/17 at 16:

00











CHRISTOPHE ESCALONA MD Feb 1, 2017 23:22


Hydralazine HCl (Apresoline) 10 mg Q6H  PRN IV ELEVATED BP;  Start 2/1/17 at 16:

00











CHRISTOPHE ESCALONA MD Feb 1, 2017 23:22

## 2017-02-01 NOTE — OPR
Progress Note:  02/01/2017

 

 

SUBJECTIVE:  The patient complains of abdominal pain and having more back pain 
with movement, but otherwise is feeling slightly better from yesterday.  

 

OBJECTIVE:

VITAL SIGNS:   Temperature is 98.1, pulse 70, respirations 18, blood pressure 
158/70, oxygen saturation 95% on room air.

GENERAL:  Well-developed female in no acute distress, lying in bed.  

SKIN:  Slight decreased tenting.  

CHEST:  Exam clear to auscultation bilaterally, slightly increased expiratory 
phase.

HEART:  Regular rate and rhythm without murmurs, gallops, rubs or ectopy.  

ABDOMEN:   Mild tenderness left lower quadrant with rebound tenderness present.
  Normoactive bowel sounds, no masses present.

EXTREMITIES:  No cyanosis, clubbing or edema.

 

LABORATORY EXAMINATION:  Hemoglobin 10.2, hematocrit 30.4, platelets 157, white 
blood cell count 5.5.  Sodium 144, potassium 3.9, chloride 109, bicarbonate 27, 
BUN of 5, creatinine 0.4, glucose 67, albumin 2.7.  INR 1.14, PT of 14.6.  
Digoxin level 0.7.  Urinalysis:  Trace leukocyte esterase, 0 to 2 red blood 
cells, 0 to 2 white blood cells.

 

ASSESSMENT AND PLAN:

1.  Perforated colon from diverticulitis.  Patient seen by Dr. Luis A Cui,  
and to be seen by Dr. Morris for consultation for possible surgical 
intervention regarding the perforation.  The patient will be continued on 
antibiotics and will have  ____ consulted to see if antibiotics need to be 
adjusted.  We will continue with morphine for pain control and p.r.n.  Zofran.

2.  Gastrointestinal bleed.  Patient has remained stable since she has been 
here and no significant decrease of hydration.  We will continue with 
monitoring and will continue off of the Coumadin.

3.  Asthma.  This has been stable.  We will continue the patient's medications.

4.  Paroxysmal atrial fibrillation.  The patient is currently in sinus rhythm 
without problems.  Patient has sick sinus syndrome as well and has a pacemaker.
  Will have cardiology see the patient in anticipation of possible surgery in 
the near future.

5.  Back pain.  Patient is a compression fracture of L1 and L4, and according 
to the CT scan, these have progressed since the CAT scan done in 2015 and 
likely result of her steroid use recently as well as causing her pain.  Will 
have physical therapy see the patient in the near future, but not at this time 
in anticipation of surgery.

 

 

Dictated By: AMPARO MAST MD

 

SR/DEENA

DD:    02/01/2017 13:12:00

DT:    02/01/2017 14:47:52

Conf#: [id number]

DID#:  [id number]

 

MTDD

## 2017-02-01 NOTE — CONS
DATE OF ADMISSION: 01/31/2017

DATE OF CONSULTATION:  02/01/2017

 

 

 

TYPE OF CONSULTATION:  Gastroenterology.

 

Thank you for having me see this patient.

 

HISTORY OF PRESENT ILLNESS:  As you know, she is an 83-year-old woman whom I am asked to see gary arteaga abdominal complaints.  The patient has had numerous abdominal issues in the past.  Her current pr
oblems seemed to have begun in December with new onset of pneumonia.  She then developed low back pa
in which was followed by feeling hot and sweaty and abdominal pain.  This led to being hospitalized 
at [facility].  She was evaluated there and eventually transferred to [facility] on intrav
enous antibiotics.  Since that time she has not done well.  Most recently her hematocrit has decreas
ed.  She has also had problems of respiratory and urinary tract infections.  In addition, there have
 been problems with a PICC line.  Because of gastrointestinal bleeding and a falling hematocrit, she
 was admitted to the hospital.

 

PAST MEDICAL HISTORY:  Significant hospitalizations related to hysterectomy, cardiac ablation, Nisse
n fundoplication, cataract surgery and bladder lift.

 

ADULT ILLNESSES:  Significant for asthma, atrial fibrillation, colon polyps, diverticulosis, gastroe
sophageal reflux and pacemaker placement.

 

IMPRESSION:  The patient most recently had an endoscopy in March 2015.  At that time, she was noted 
to have abnormal esophageal motility, being status post fundoplication and a widely patent gastroeso
phageal junction.  Biopsies were performed at that time which did not show any specific pathology.  
Her most recent colonoscopy was in 2012.  At that time, she had pancolonic diverticulosis with an ex
tremely tortuous redundant sigmoid colon.  She also had a diminutive ascending colon tubular adenoma
 removed.

 

ALLERGIES:.  THE PATIENT STATES:  

1.  CEFUROXIME.

2.  CODEINE.

3.   IODINE.

4.  LATEX.

5.  LEVAQUIN.

3.  PENICILLIN.

4.  SULFA.

 

INJURIES:  None.

 

MEDICATIONS:  Currently include:  

1.  Digoxin.

2. Magnesium oxide.

3.  Loratadine.

4.  Meropenem.

5.  Mometasone

6.  Singulair. 

7.  Restasis.

8.  Levalbuterol.

 

SOCIAL HISTORY:  The patient does not smoke and does not drink alcohol.  She is a retired elementary
 .

 

FAMILY HISTORY:  Noncontributory.

 

REVIEW OF SYSTEMS:  Negative as noted above.

 

PHYSICAL EXAMINATION:

GENERAL:  Shows the patient is a well-developed elderly white female lying in bed.

 

LABORATORY DATA:  White count 5.5, hemoglobin 10, hematocrit 30, platelets 157.  PT/INR 1.1, AST 18.

 

CAT scan performed yesterday is remarkable for sigmoid colon diverticulitis and localized diverticul
ar perforation.

 

IMPRESSION:  Given the patient's extended course of antibiotics and her still having a diverticular 
perforation,  I believe the most reasonable course of therapy would be that of having surgery.  The 
patient is quite concerned about the possibility of a colostomy.  In that regard, I have told the pa
maya to discuss this with Dr. Morris.  Overall, I await Dr. Morris's opinion regarding proceeding
 with surgery.  In terms of the patient's longstanding problems with reflux, we will restart proton 
pump inhibitor therapy.

 

PLAN:

1.  Restart proton pump inhibitor therapy.

2.  Continue antibiotic management per Dr. Nieto and infectious disease.

3.  Await surgical consultation with Dr. Morris.

 

Thank you for having me see this patient.

 

 

Dictated By: ESA HINTON/DEENA

DD:    02/01/2017 07:09:42

DT:    02/01/2017 11:02:39

Conf#: [id number]

DID#:  [id number]

## 2017-02-02 VITALS — RESPIRATION RATE: 20 BRPM | DIASTOLIC BLOOD PRESSURE: 66 MMHG | SYSTOLIC BLOOD PRESSURE: 153 MMHG

## 2017-02-02 VITALS — RESPIRATION RATE: 20 BRPM | SYSTOLIC BLOOD PRESSURE: 145 MMHG | DIASTOLIC BLOOD PRESSURE: 67 MMHG

## 2017-02-02 VITALS — DIASTOLIC BLOOD PRESSURE: 70 MMHG | RESPIRATION RATE: 16 BRPM | SYSTOLIC BLOOD PRESSURE: 164 MMHG

## 2017-02-02 VITALS — DIASTOLIC BLOOD PRESSURE: 67 MMHG | SYSTOLIC BLOOD PRESSURE: 151 MMHG | RESPIRATION RATE: 22 BRPM

## 2017-02-02 RX ADMIN — DILTIAZEM HYDROCHLORIDE SCH MG: 60 TABLET, FILM COATED ORAL at 14:29

## 2017-02-02 RX ADMIN — FOLIC ACID SCH MLS/HR: 5 INJECTION, SOLUTION INTRAMUSCULAR; INTRAVENOUS; SUBCUTANEOUS at 08:23

## 2017-02-02 RX ADMIN — MOMETASONE FUROATE SCH PUFF: 220 INHALANT RESPIRATORY (INHALATION) at 20:36

## 2017-02-02 RX ADMIN — MEROPENEM SCH MLS/HR: 1 INJECTION, POWDER, FOR SOLUTION INTRAVENOUS at 08:48

## 2017-02-02 RX ADMIN — DILTIAZEM HYDROCHLORIDE SCH MG: 60 TABLET, FILM COATED ORAL at 08:21

## 2017-02-02 RX ADMIN — MORPHINE SULFATE PRN MG: 2 INJECTION, SOLUTION INTRAMUSCULAR; INTRAVENOUS at 21:40

## 2017-02-02 RX ADMIN — PANTOPRAZOLE SODIUM SCH MG: 40 INJECTION, POWDER, FOR SOLUTION INTRAVENOUS at 06:50

## 2017-02-02 RX ADMIN — DILTIAZEM HYDROCHLORIDE SCH MG: 60 TABLET, FILM COATED ORAL at 20:36

## 2017-02-02 RX ADMIN — DILTIAZEM HYDROCHLORIDE SCH MG: 60 TABLET, FILM COATED ORAL at 17:27

## 2017-02-02 RX ADMIN — MAGNESIUM OXIDE TAB 400 MG (241.3 MG ELEMENTAL MG) SCH MG: 400 (241.3 MG) TAB at 08:20

## 2017-02-02 RX ADMIN — FOLIC ACID SCH MLS/HR: 5 INJECTION, SOLUTION INTRAMUSCULAR; INTRAVENOUS; SUBCUTANEOUS at 18:52

## 2017-02-02 RX ADMIN — PANTOPRAZOLE SODIUM SCH MG: 40 INJECTION, POWDER, FOR SOLUTION INTRAVENOUS at 17:28

## 2017-02-02 RX ADMIN — DIGOXIN SCH MG: 125 TABLET ORAL at 08:20

## 2017-02-02 RX ADMIN — MEROPENEM SCH MLS/HR: 1 INJECTION, POWDER, FOR SOLUTION INTRAVENOUS at 20:34

## 2017-02-02 RX ADMIN — MOMETASONE FUROATE SCH PUFF: 220 INHALANT RESPIRATORY (INHALATION) at 08:21

## 2017-02-02 RX ADMIN — DEXAMETHASONE SODIUM PHOSPHATE PRN MG: 10 INJECTION, SOLUTION INTRAMUSCULAR; INTRAVENOUS at 21:48

## 2017-02-02 RX ADMIN — MONTELUKAST SODIUM SCH MG: 10 TABLET, FILM COATED ORAL at 08:20

## 2017-02-02 RX ADMIN — LORATADINE SCH MG: 10 TABLET ORAL at 08:21

## 2017-02-02 NOTE — CONS
Date/Time of Note


Date/Time of Note


DATE: 2/2/17 


TIME: 06:30





Assessment/Plan


Assessment/Plan


Chief Complaint/Hosp Course





1)diverticulitis with contained perforation


no drainable abscess


pt has been on almost 4 weeks of antibiotics, doubtful that antibiotics alone 

will be curative


no evidence to suggest that she has failed antibiotics due to resistance to 

current antibiotic (no F, C, NS, elevated wbc)


continue with merrem


if patient develops fever or condition worsens then would consider adding vanco 

for enterococcal coverage, merrem has great anaerobic coverage


await surgical input for definitive treatment





2/2 - pt to get abd/pelv CT with rectal contrast to evaluate for continued leak


continue with merrem at present





2) multiple drug allergies


thankfully patient is tolerating the merrem





3) paroxysmal a-fib with pacer


pt has been on coumadin but not currently and INR is good


Problems:  





Consultation Date/Type/Reason


Admit Date/Time


Jan 31, 2017 at 15:04


Initial Consult Date


2/1/17


Type of Consultation:  ID





24 HR Interval Summary


Free Text/Dictation


pt has to go every 1-2 hours 


no blood in stool currently 


no N, V 


still has abd pain just to RLQ





Exam/Review of Systems


Vital Signs


Vitals





 Vital Signs








  Date Time  Temp Pulse Resp B/P Pulse Ox O2 Delivery O2 Flow Rate FiO2


 


2/1/17 20:26 98.3 65 18 136/63 91   














 Intake and Output   


 


 2/1/17 2/1/17 2/2/17





 15:00 23:00 07:00


 


Intake Total  1490 ml 1700 ml


 


Output Total  1200 ml 1450 ml


 


Balance  290 ml 250 ml











Exam


Constitutional:  alert, oriented


Head:  normocephalic


Eyes:  nl conjunctiva


ENMT:  mucosa pink and moist


Respiratory:  clear to auscultation


Cardiovascular:  regular rate and rhythm


Gastrointestinal:  soft, tender





Results


Result Diagram:  


2/1/17 0555                                                                    

            2/1/17 0555





Results 24 hrs





Laboratory Tests








Test


  2/1/17


15:29 2/1/17


17:13 2/1/17


18:46


 


Bedside Glucose 68  L 65  L 122  











Medications


Medications





 Current Medications


Meropenem (Merrem 1 Gm/100 ml (Pmx)) 100 ml @  200 mls/hr Q12 IVPB  Last 

administered on 2/1/17at 21:43; Admin Dose 200 MLS/HR;  Start 1/31/17 at 21:00


Morphine Sulfate (morphine) 2 mg Q3H  PRN IV PAIN LEVEL 6-10 Last administered 

on 2/1/17at 15:20; Admin Dose 2 MG;  Start 1/31/17 at 16:00


Mometasone Furoate (Asmanex) 1 puff BID INH  Last administered on 2/1/17at 21:43

; Admin Dose 1 PUFF;  Start 1/31/17 at 21:00


Montelukast Sodium (Singulair) 10 mg HS PO  Last administered on 1/31/17at 22:19

; Admin Dose 10 MG;  Start 1/31/17 at 21:00


Cyclosporine (Restasis) 1 drop BID BOTH EYES  Last administered on 2/1/17at 08:

10; Admin Dose 1 DROP;  Start 1/31/17 at 21:00


Diltiazem HCl 60 mg 60 mg QID PO  Last administered on 2/1/17at 21:44; Admin 

Dose 60 MG;  Start 1/31/17 at 17:00


Sodium Chloride (NS) 1,000 ml @  125 mls/hr Q8H IV  Last administered on 2/1/ 17at 23:48; Admin Dose 125 MLS/HR;  Start 1/31/17 at 20:00


Fluticasone Propionate (Flonase 0.05% Nasal) 1 spray BID NASAL  Last 

administered on 2/1/17at 21:43; Admin Dose 1 SPRAY;  Start 1/31/17 at 21:00


Acetaminophen (Tylenol Liquid) 650 mg Q4H  PRN GTB PAIN AND OR ELEVATED TEMP;  

Start 1/31/17 at 20:00


Magnesium Oxide (Mag-Ox 400) 400 mg DAILY PO  Last administered on 2/1/17at 08:

10; Admin Dose 400 MG;  Start 2/1/17 at 09:00


Ondansetron HCl (Zofran Inj) 4 mg Q4H  PRN IV NAUSEA AND/OR VOMITING Last 

administered on 2/1/17at 15:19; Admin Dose 4 MG;  Start 1/31/17 at 20:00


Loratadine (Claritin) 10 mg DAILY PO  Last administered on 2/1/17at 08:10; 

Admin Dose 10 MG;  Start 2/1/17 at 09:00


Zolpidem Tartrate (Ambien) 5 mg HS  PRN PO INSOMNIA Last administered on 2/1/ 17at 21:56; Admin Dose 5 MG;  Start 1/31/17 at 20:00


Digoxin (Digoxin) 0.125 mg DAILY@13 PO  Last administered on 2/1/17at 12:35; 

Admin Dose 0.125 MG;  Start 2/1/17 at 13:00


Pantoprazole (Protonix Iv) 40 mg BID@06,18 IV  Last administered on 2/1/17at 17:

13; Admin Dose 40 MG;  Start 2/1/17 at 18:00


Hydralazine HCl (Apresoline) 10 mg Q6H  PRN IV ELEVATED BP;  Start 2/1/17 at 16:

00











PHUC COOLEY MD Feb 2, 2017 06:32

## 2017-02-02 NOTE — SP
DATE OF VISIT:  02/02/2017

MEDICAL PROGRESS NOTE: 

 

SUBJECTIVE: The patient is feeling a little bit better with less abdominal pain
, but still with the same amount of back pain at times.  

 

OBJECTIVE:  Temperature 98.4, blood pressure 151/67, pulse is 66, respirations 
22, oxygen saturation 94% on room air.

GENERAL:  Well-developed, well-nourished female in no acute distress.  Skin 
with mild tenting.  No rashes.  Chest exam clear to auscultation bilaterally 
but increased expiratory phase.

HEART:  Regular rate and rhythm, normal S1, S2.  No murmurs, gallops or rubs 
noted.

ABDOMEN:  Soft, nondistended.  Normoactive bowel sounds and rebound tenderness 
in the left lower quadrant.  No masses present.

EXTREMITIES:  No cyanosis, clubbing or edema.

NEUROLOGIC:  Nonfocal.  

 

LABORATORY:  None pending. 

 

ASSESSMENT AND PLAN:  

1.  Perforated sigmoid colon from diverticulitis.  The patient remained stable, 
but will need to undergo definitive surgery in order to improve her condition.  
I appreciate Dr. Cui, Dr. Morris and Dr. Goldman's  consultations in this 
patient's care.  Will discuss with surgery further and Dr. Morris her upcoming 
surgery, the timing and the details and whether or not the patient will need to 
have a colostomy or if this can be avoided.  In the meantime, will continue 
with antibiotics, abdominal pain. The patient's diet has been changed to 
nothing by mouth except for meds at this point.

2.  Paroxysmal atrial fibrillation/sick sinus syndrome with pacemaker.  Patient 
remains with high blood pressure.  The patient remained stable and is currently 
in sinus rhythm.  We will continue the patient's medications and continue to 
hold patient's Coumadin.

3.  Asthma.  This has been stable.  Continue with the breathing treatments as 
well as her medications.

4.  Anemia.  This has remained stable and will recheck in a.m.

5.  Back pain, stable, likely related to compression fractures and will 
institute physical therapy at this time and continue p.r.n. pain medication.

 

 

Dictated By: AMPARO MAST MD, SR/DEENA

DD:    02/02/2017 13:36:15

DT:    02/02/2017 16:19:02

Conf#: [id number]

DID#:  [id number]

 

MTDD

## 2017-02-02 NOTE — RADRPT
PROCEDURE:   XR Chest. 

 

CLINICAL INDICATION:   Shortness of breath.

 

TECHNIQUE:   Single frontal view. 

 

COMPARISON:   05/16/2013. 

 

FINDINGS:

There is mild atelectasis at the lung bases, worse than seen previously.  The lungs are otherwise cl
ear.  There is a new right arm PICC line with the tip in the lower superior vena cava.  There is a l
eft-sided dual lead permanent pacemaker with leads in the right atrium and right ventricle. 

The heart size is normal. 

There are small bilateral pleural effusions. 

There is no pneumothorax.   

 

IMPRESSION:

1.  Mild atelectasis at the lung bases.

2.  Right arm PICC line tip in the lower superior vena cava.

3.  Permanent pacemaker.

4.  Small bilateral pleural effusions.  

 

RPTAT: QQ

_____________________________________________ 

.Jean-Pierre López MD, MD           Date    Time 

Electronically viewed and signed by .Jean-Pierre López MD, MD on 02/02/2017 15:43 

 

D:  02/02/2017 15:43  T:  02/02/2017 15:43

.R/

## 2017-02-02 NOTE — PN
Date/Time of Note


Date/Time of Note


DATE: 2/2/17 


TIME: 19:48





Assessment/Plan


Lines/Catheters


IV Catheter Type (from Nrsg):  PICC Line


Coffey in Place (from Nrsg):  No





Assessment/Plan


Chief Complaint/Hosp Course


1. Microperforated diverticulitis, ? continued leak vs already sealed (old 

residual).  Had a long d/w pt regarding options.  I recommended surgery since ? 

re-perforated.  I also advised that higher change for placement of colostomy 

and possible conversion to open.  Patient is vehemently apposing placing 

colostomy.


-iv abx


-npo x meds


-will obtain rectal enema (gg) contrast study to evaluate if continual leak


2. Anemia without evidence of acute blood loss


-Monitor


3. Paroxysmal atrial fibrillation currently in sinus.


-Optimize electrolytes


4. GERD


-Diet and lifestyle optimization


-PPI


5. Asthma


-Medical optimization


6. Hypertension


-Nutrition and medication optimization


7. Back pain with Vertebral compression fracture


-Physical therapy


-Pain control


8. History of sick sinus syndrome status post pacemaker


-Cardiac optimization





Thank you,


Problems:  





Subjective


24 Hr Interval Summary


Repeat CT was done without rectal contrast. Will need to redo the test. No 

fevers or chills. No nausea vomiting. No chest pain or shortness of breath. No 

visual or neurologic changes. No dysuria. Abdominal pain improving. Still has 

back pain.





Exam/Review of Systems


Vital Signs


Vitals





 Vital Signs








  Date Time  Temp Pulse Resp B/P Pulse Ox O2 Delivery O2 Flow Rate FiO2


 


2/4/17 10:23 98.2 71 18 157/66 93 Room Air  














 Intake and Output   


 


 2/3/17 2/3/17 2/4/17





 15:00 23:00 07:00


 


Intake Total 800 ml 300 ml 1025 ml


 


Output Total  4 ml 1000 ml


 


Balance 800 ml 296 ml 25 ml











Exam


Free Text/Dictation


Constitutional:  alert, oriented, No distress


Psych:  anxiety, No confusion


Head:  atraumatic, normocephalic


Eyes:  EOMI, PERRL, nl conjunctiva, No icteric


ENMT:  mucosa pink and moist, nl external ears & nose, nl lips & teeth


Neck:  non-tender, No jvd


Respiratory:  normal air movement, No congested cough, No labored breathing


Cardiovascular:  regular rate and rhythm, No edema


Gastrointestinal:  soft, tender (left lower quadrant), No rebound or guarding


Musculoskeletal:  nl extremities to inspection, nl gait and stance, No joint 

tenderness


Extremities:  normal pulses, No calf tenderness, No cyanosis, No edema


Neurological:  nl mental status, nl speech, nl strength


Skin:  nl turgor, No diaphoresis, No rash or lesions


Lymph:  nl lymph nodes





Results


Result Diagram:  


2/4/17 0525                                                                    

            2/4/17 0525














CHRISTOPHE ESCALONA MD Feb 2, 2017 19:50

## 2017-02-02 NOTE — RADRPT
PROCEDURE:   CT Abdomen and Pelvis without contrast. 

 

CLINICAL INDICATION:    Abdominal pain

 

TECHNIQUE:   CT of the abdomen and pelvis was performed on a multi-detector scanner without IV contr
ast.  Coronal and sagittal images were reformatted from the axial data set.  One or more of the foll
owing dose reduction techniques were used: automated exposure control,  adjustment of the mA and/or 
kV according to patient size, use of iterative reconstruction technique.  CTDI = 7.26 mGy. DLP = 364
.91 mGy-cm.

 

COMPARISON:   CT, 01/31/2017 

 

FINDINGS:

 

CT abdomen:

 

There are mild bilateral pleural effusions.  The heart size is normal, without pericardial effusion.
  Cholelithiasis is noted without evidence for cholecystitis.  Liver, biliary tree, pancreas, spleen
, adrenal glands and kidneys are unremarkable.  No urolithiasis or obstructive uropathy is identifie
d.  The stomach is partially collapsed, but appears grossly unremarkable.

 

The aorta is of normal caliber.  Aortic vascular calcifications are present.  There is no retroperit
morales lymphadenopathy.  The efrem hepatis region is clear.  The patient is status post ventral herni
a repair.

 

CT pelvis:

 

Inflammation and extraluminal gas are again seen adjacent to sigmoid colon diverticula compatible wi
th diverticulitis and localized diverticular perforation.  No upper abdominal free air is identified
.  There is no bowel obstruction.  No loculated or drainable abscess collection is seen at this time
.  The appendix is well visualized and normal.  Urinary bladder is grossly unremarkable.  Uterus is 
surgically absent.  No pelvic mass, free fluid or lymphadenopathy is identified.

 

The surrounding osseous structures are remarkable for diffuse degenerative spondylosis of the spine.
  There are stable chronic compression deformities of L1 and L4.  No osteolytic or osteoblastic lesi
on is detected.  

 

IMPRESSION:

 

1.  Again noted is sigmoid colon diverticulitis with localized diverticular perforation, stable to s
lightly improved when compared to the prior CT.  No loculated or drainable abscess collection is see
n at this time.

2.  Cholelithiasis is noted without evidence for cholecystitis.

3.  There are mild bilateral pleural effusions, slightly increased.

4.  Aortoiliac atherosclerotic calcifications are present.

5.  Uterus is surgically absent.  

 

RPTAT: 

_____________________________________________ 

.Ajit Carranza MD, MD           Date    Time 

Electronically viewed and signed by .Ajit Carranza MD, MD on 02/02/2017 16:12 

 

D:  02/02/2017 16:12  T:  02/02/2017 16:12

.R/

## 2017-02-02 NOTE — RADRPT
Vent Rate: 65 bpm

RR Interval: 0 msec

DE Interval: 150 msec

QRS Duration: 80 msec

QT Interval: 386 msec

QTC Interval: 401 msec

P-R-T Axis: 26 - 34 - 54 degrees

 

 Electronic atrial pacemaker

 Anterior infarct , age undetermined

 Abnormal ECG

 

Electronically Signed By: Joao Hawkins

[id number]

## 2017-02-02 NOTE — CONS
Date/Time of Note


Date/Time of Note


DATE: 2/2/17 


TIME: 12:26





Consult Date/Type/Reason


Admit Date/Time


Jan 31, 2017 at 15:04


Initial Consult Date


2/1/17


Type of Consultation:  GI





Subjective


No new complaints


Very anxious about anticipated surgery especially possibility of colostomy





Objective





 Vital Signs








  Date Time  Temp Pulse Resp B/P Pulse Ox O2 Delivery O2 Flow Rate FiO2


 


2/2/17 08:03 98.4 66 22 151/67 94   








Abd: soft, minimal LLQ tenderness








 Intake and Output   


 


 2/1/17 2/1/17 2/2/17





 15:00 23:00 07:00


 


Intake Total  1490 ml 1700 ml


 


Output Total  1200 ml 1450 ml


 


Balance  290 ml 250 ml











Results/Medications


Result Diagram:  


2/1/17 0555                                                                    

            2/1/17 0555





Results 24 hrs





Laboratory Tests








Test


  2/1/17


15:29 2/1/17


17:13 2/1/17


18:46


 


Bedside Glucose 68  L 65  L 122  








Medications





 Current Medications


Meropenem (Merrem 1 Gm/100 ml (Pmx)) 100 ml @  200 mls/hr Q12 IVPB  Last 

administered on 2/2/17at 08:48; Admin Dose 200 MLS/HR;  Start 1/31/17 at 21:00


Morphine Sulfate (morphine) 2 mg Q3H  PRN IV PAIN LEVEL 6-10 Last administered 

on 2/1/17at 15:20; Admin Dose 2 MG;  Start 1/31/17 at 16:00


Mometasone Furoate (Asmanex) 1 puff BID INH  Last administered on 2/2/17at 08:21

; Admin Dose 1 PUFF;  Start 1/31/17 at 21:00


Montelukast Sodium (Singulair) 10 mg HS PO  Last administered on 2/2/17at 08:20

; Admin Dose 10 MG;  Start 1/31/17 at 21:00


Cyclosporine (Restasis) 1 drop BID BOTH EYES  Last administered on 2/2/17at 08:

21; Admin Dose 1 DROP;  Start 1/31/17 at 21:00


Diltiazem HCl 60 mg 60 mg QID PO  Last administered on 2/2/17at 08:21; Admin 

Dose 60 MG;  Start 1/31/17 at 17:00


Sodium Chloride (NS) 1,000 ml @  125 mls/hr Q8H IV  Last administered on 2/2/ 17at 08:23; Admin Dose 125 MLS/HR;  Start 1/31/17 at 20:00


Fluticasone Propionate (Flonase 0.05% Nasal) 1 spray BID NASAL  Last 

administered on 2/2/17at 08:21; Admin Dose 1 SPRAY;  Start 1/31/17 at 21:00


Acetaminophen (Tylenol Liquid) 650 mg Q4H  PRN GTB PAIN AND OR ELEVATED TEMP;  

Start 1/31/17 at 20:00


Magnesium Oxide (Mag-Ox 400) 400 mg DAILY PO  Last administered on 2/2/17at 08:

20; Admin Dose 400 MG;  Start 2/1/17 at 09:00


Ondansetron HCl (Zofran Inj) 4 mg Q4H  PRN IV NAUSEA AND/OR VOMITING Last 

administered on 2/1/17at 15:19; Admin Dose 4 MG;  Start 1/31/17 at 20:00


Loratadine (Claritin) 10 mg DAILY PO  Last administered on 2/2/17at 08:21; 

Admin Dose 10 MG;  Start 2/1/17 at 09:00


Zolpidem Tartrate (Ambien) 5 mg HS  PRN PO INSOMNIA Last administered on 2/1/ 17at 21:56; Admin Dose 5 MG;  Start 1/31/17 at 20:00


Digoxin (Digoxin) 0.125 mg DAILY@13 PO  Last administered on 2/2/17at 08:20; 

Admin Dose 0.125 MG;  Start 2/1/17 at 13:00


Pantoprazole (Protonix Iv) 40 mg BID@06,18 IV  Last administered on 2/2/17at 06:

50; Admin Dose 40 MG;  Start 2/1/17 at 18:00


Hydralazine HCl (Apresoline) 10 mg Q6H  PRN IV ELEVATED BP;  Start 2/1/17 at 16:

00





Assessment/Plan


Chief Complaint/Hosp Course


Impression:


1. Diverticular Perforation





Plan:


1. antibiotics per Dr. Rivera


2. await xray ordered by Dr. Morris


3. anticipate surgery by Dr. Morris soon as patient is now accepting


Problems:  











ESA RATLIFF MD Feb 2, 2017 12:30

## 2017-02-03 VITALS — SYSTOLIC BLOOD PRESSURE: 148 MMHG | RESPIRATION RATE: 20 BRPM | DIASTOLIC BLOOD PRESSURE: 66 MMHG

## 2017-02-03 VITALS — SYSTOLIC BLOOD PRESSURE: 176 MMHG | DIASTOLIC BLOOD PRESSURE: 74 MMHG | RESPIRATION RATE: 18 BRPM

## 2017-02-03 LAB
ANION GAP SERPL CALC-SCNC: 18 MMOL/L (ref 8–16)
BASOPHILS # BLD AUTO: 0 10^3/UL (ref 0–0.1)
BASOPHILS NFR BLD: 0.7 % (ref 0–2)
BUN SERPL-MCNC: 3 MG/DL (ref 7–20)
CALCIUM SERPL-MCNC: 9.1 MG/DL (ref 8.4–10.2)
CHLORIDE SERPL-SCNC: 109 MMOL/L (ref 97–110)
CO2 SERPL-SCNC: 20 MMOL/L (ref 21–31)
CONDITION: 1
CREAT SERPL-MCNC: 0.38 MG/DL (ref 0.44–1)
EOSINOPHIL # BLD: 0.1 10^3/UL (ref 0–0.5)
EOSINOPHIL NFR BLD: 0.9 % (ref 0–7)
ERYTHROCYTE [DISTWIDTH] IN BLOOD BY AUTOMATED COUNT: 15.8 % (ref 11.5–14.5)
GLUCOSE SERPL-MCNC: 53 MG/DL (ref 70–220)
HCT VFR BLD CALC: 30.5 % (ref 37–47)
HGB BLD-MCNC: 10.2 G/DL (ref 12–16)
LH ANALYZER COMMENTS: 1
LYMPHOCYTES # BLD AUTO: 1.7 10^3/UL (ref 0.8–2.9)
LYMPHOCYTES NFR BLD AUTO: 28.8 % (ref 15–51)
MCH RBC QN AUTO: 30.5 PG (ref 29–33)
MCHC RBC AUTO-ENTMCNC: 33.6 G/DL (ref 32–37)
MCV RBC AUTO: 90.9 FL (ref 82–101)
MONOCYTES # BLD: 0.4 10^3/UL (ref 0.3–0.9)
MONOCYTES NFR BLD: 6.9 % (ref 0–11)
NEUTROPHILS # BLD: 3.8 10^3/UL (ref 1.6–7.5)
NEUTROPHILS NFR BLD AUTO: 62.7 % (ref 39–77)
NRBC # BLD MANUAL: 0 10^3/UL (ref 0–0)
NRBC BLD QL: 0 /100WBC (ref 0–0)
PLATELET # BLD: 171 10^3/UL (ref 140–440)
PMV BLD AUTO: 8.4 FL (ref 7.4–10.4)
POTASSIUM SERPL-SCNC: 3.6 MMOL/L (ref 3.5–5.1)
RBC # BLD AUTO: 3.36 10^6/UL (ref 4.2–5.4)
SODIUM SERPL-SCNC: 143 MMOL/L (ref 135–144)
WBC # BLD AUTO: 6 10^3/UL (ref 4.8–10.8)
WBC # BLD: 6 10^3/UL (ref 4.8–10.8)

## 2017-02-03 RX ADMIN — DEXAMETHASONE SODIUM PHOSPHATE PRN MG: 10 INJECTION, SOLUTION INTRAMUSCULAR; INTRAVENOUS at 08:50

## 2017-02-03 RX ADMIN — FOLIC ACID SCH MLS/HR: 5 INJECTION, SOLUTION INTRAMUSCULAR; INTRAVENOUS; SUBCUTANEOUS at 04:26

## 2017-02-03 RX ADMIN — POTASSIUM CHLORIDE SCH MLS/HR: 2 INJECTION, SOLUTION, CONCENTRATE INTRAVENOUS at 11:46

## 2017-02-03 RX ADMIN — PANTOPRAZOLE SODIUM SCH MG: 40 INJECTION, POWDER, FOR SOLUTION INTRAVENOUS at 06:04

## 2017-02-03 RX ADMIN — MEROPENEM SCH MLS/HR: 1 INJECTION, POWDER, FOR SOLUTION INTRAVENOUS at 08:43

## 2017-02-03 RX ADMIN — DILTIAZEM HYDROCHLORIDE SCH MG: 60 TABLET, FILM COATED ORAL at 21:42

## 2017-02-03 RX ADMIN — MORPHINE SULFATE PRN MG: 2 INJECTION, SOLUTION INTRAMUSCULAR; INTRAVENOUS at 22:02

## 2017-02-03 RX ADMIN — MOMETASONE FUROATE SCH PUFF: 220 INHALANT RESPIRATORY (INHALATION) at 21:45

## 2017-02-03 RX ADMIN — MAGNESIUM OXIDE TAB 400 MG (241.3 MG ELEMENTAL MG) SCH MG: 400 (241.3 MG) TAB at 08:40

## 2017-02-03 RX ADMIN — MONTELUKAST SODIUM SCH MG: 10 TABLET, FILM COATED ORAL at 21:42

## 2017-02-03 RX ADMIN — LORATADINE SCH MG: 10 TABLET ORAL at 08:54

## 2017-02-03 RX ADMIN — DILTIAZEM HYDROCHLORIDE SCH MG: 60 TABLET, FILM COATED ORAL at 08:52

## 2017-02-03 RX ADMIN — DILTIAZEM HYDROCHLORIDE SCH MG: 60 TABLET, FILM COATED ORAL at 13:00

## 2017-02-03 RX ADMIN — FOLIC ACID SCH MLS/HR: 5 INJECTION, SOLUTION INTRAMUSCULAR; INTRAVENOUS; SUBCUTANEOUS at 04:00

## 2017-02-03 RX ADMIN — PANTOPRAZOLE SODIUM SCH MG: 40 INJECTION, POWDER, FOR SOLUTION INTRAVENOUS at 17:46

## 2017-02-03 RX ADMIN — MEROPENEM SCH MLS/HR: 1 INJECTION, POWDER, FOR SOLUTION INTRAVENOUS at 08:38

## 2017-02-03 RX ADMIN — DILTIAZEM HYDROCHLORIDE SCH MG: 60 TABLET, FILM COATED ORAL at 17:47

## 2017-02-03 RX ADMIN — DIGOXIN SCH MG: 125 TABLET ORAL at 17:47

## 2017-02-03 RX ADMIN — MOMETASONE FUROATE SCH PUFF: 220 INHALANT RESPIRATORY (INHALATION) at 08:38

## 2017-02-03 NOTE — CONS
Date/Time of Note


Date/Time of Note


DATE: 2/3/17 


TIME: 06:26





Assessment/Plan


Assessment/Plan


Chief Complaint/Hosp Course





1)diverticulitis with contained perforation


no drainable abscess


pt has been on almost 4 weeks of antibiotics, doubtful that antibiotics alone 

will be curative


no evidence to suggest that she has failed antibiotics due to resistance to 

current antibiotic (no F, C, NS, elevated wbc)


continue with merrem


if patient develops fever or condition worsens then would consider adding vanco 

for enterococcal coverage, merrem has great anaerobic coverage


await surgical input for definitive treatment


2/2 - pt to get abd/pelv CT with rectal contrast to evaluate for continued leak


continue with merrem at present





2/3 - pt refused rectal contrast due to pain, consideration for sedation is 

being entertained


no change from ID perspective


doubt antibiotics alone will be curative





2) multiple drug allergies


thankfully patient is tolerating the merrem





3) paroxysmal a-fib with pacer


pt has been on coumadin but not currently and INR is good


Problems:  





Consultation Date/Type/Reason


Admit Date/Time


Jan 31, 2017 at 15:04


Initial Consult Date


2/1/17


Type of Consultation:  ID


Referring Provider:  AMPARO MAST MD-





24 HR Interval Summary


Free Text/Dictation


pt has less pain to RLQ


pt was unable to do CT with contrast due to LLQ pain


pt is requesting sedation to get abd/pelv CT with rectal contrast done


no N, V, SOB





Exam/Review of Systems


Vital Signs


Vitals





 Vital Signs








  Date Time  Temp Pulse Resp B/P Pulse Ox O2 Delivery O2 Flow Rate FiO2


 


2/2/17 20:00 98.1 67 20 145/67 97   














 Intake and Output   


 


 2/2/17 2/2/17 2/3/17





 15:00 23:00 07:00


 


Intake Total 100 ml 1015 ml 1125 ml


 


Output Total  1200 ml 1400 ml


 


Balance 100 ml -185 ml -275 ml











Exam


Constitutional:  alert, oriented


Head:  normocephalic


Eyes:  nl conjunctiva


ENMT:  mucosa pink and moist


Respiratory:  clear to auscultation


Cardiovascular:  regular rate and rhythm


Gastrointestinal:  soft, tender


Musculoskeletal:  nl extremities to inspection





Results


Result Diagram:  


2/1/17 0555                                                                    

            2/3/17 0508





Results 24 hrs





Laboratory Tests








Test


  2/3/17


05:08


 


Anion Gap 18  H


 


Blood Urea Nitrogen 3  L


 


Calcium Level 9.1  


 


Carbon Dioxide Level 20  L


 


Chloride Level 109  


 


Creatinine 0.38  L


 


Glucose Level 53  #L


 


Potassium Level 3.6  


 


Sodium Level 143  











Medications


Medications





 Current Medications


Meropenem (Merrem 1 Gm/100 ml (Pmx)) 100 ml @  200 mls/hr Q12 IVPB  Last 

administered on 2/2/17at 20:34; Admin Dose 200 MLS/HR;  Start 1/31/17 at 21:00


Morphine Sulfate (morphine) 2 mg Q3H  PRN IV PAIN LEVEL 6-10 Last administered 

on 2/2/17at 21:40; Admin Dose 2 MG;  Start 1/31/17 at 16:00


Mometasone Furoate (Asmanex) 1 puff BID INH  Last administered on 2/2/17at 20:36

; Admin Dose 1 PUFF;  Start 1/31/17 at 21:00


Montelukast Sodium (Singulair) 10 mg HS PO  Last administered on 2/2/17at 08:20

; Admin Dose 10 MG;  Start 1/31/17 at 21:00


Cyclosporine (Restasis) 1 drop BID BOTH EYES  Last administered on 2/2/17at 08:

21; Admin Dose 1 DROP;  Start 1/31/17 at 21:00


Diltiazem HCl 60 mg 60 mg QID PO  Last administered on 2/2/17at 20:36; Admin 

Dose 60 MG;  Start 1/31/17 at 17:00


Sodium Chloride (NS) 1,000 ml @  125 mls/hr Q8H IV  Last administered on 2/3/

17at 04:26; Admin Dose 125 MLS/HR;  Start 1/31/17 at 20:00


Fluticasone Propionate (Flonase 0.05% Nasal) 1 spray BID NASAL  Last 

administered on 2/2/17at 20:36; Admin Dose 1 SPRAY;  Start 1/31/17 at 21:00


Acetaminophen (Tylenol Liquid) 650 mg Q4H  PRN GTB PAIN AND OR ELEVATED TEMP;  

Start 1/31/17 at 20:00


Magnesium Oxide (Mag-Ox 400) 400 mg DAILY PO  Last administered on 2/2/17at 08:

20; Admin Dose 400 MG;  Start 2/1/17 at 09:00


Ondansetron HCl (Zofran Inj) 4 mg Q4H  PRN IV NAUSEA AND/OR VOMITING Last 

administered on 2/2/17at 21:48; Admin Dose 4 MG;  Start 1/31/17 at 20:00


Loratadine (Claritin) 10 mg DAILY PO  Last administered on 2/2/17at 08:21; 

Admin Dose 10 MG;  Start 2/1/17 at 09:00


Zolpidem Tartrate (Ambien) 5 mg HS  PRN PO INSOMNIA Last administered on 2/1/ 17at 21:56; Admin Dose 5 MG;  Start 1/31/17 at 20:00


Digoxin (Digoxin) 0.125 mg DAILY@13 PO  Last administered on 2/2/17at 08:20; 

Admin Dose 0.125 MG;  Start 2/1/17 at 13:00


Pantoprazole (Protonix Iv) 40 mg BID@06,18 IV  Last administered on 2/3/17at 06:

04; Admin Dose 40 MG;  Start 2/1/17 at 18:00


Hydralazine HCl (Apresoline) 10 mg Q6H  PRN IV ELEVATED BP;  Start 2/1/17 at 16:

00











PHUC COOLEY MD Feb 3, 2017 06:30

## 2017-02-03 NOTE — CONS
Date/Time of Note


Date/Time of Note


DATE: 2/3/17 


TIME: 09:29





Consult Date/Type/Reason


Admit Date/Time


Jan 31, 2017 at 15:04


Initial Consult Date


2/1/17


Type of Consultation:  GI


Ordering Provider:  AMPARO NIETO MD-





Subjective


Had CT scan, but no contrast administered


Discussed at length with Dr. Morris who has requested repeat study in attempt 

to avoid imminent surgeryand decrease risk of colostomy


Patient in tears about overall situation





Objective





 Vital Signs








  Date Time  Temp Pulse Resp B/P Pulse Ox O2 Delivery O2 Flow Rate FiO2


 


2/3/17 07:56 97.6 66 20 148/66 92   





Chest: clear


Cardiac: no m,r,g


Abdomen: soft, mild LLQ tenderness








 Intake and Output   


 


 2/2/17 2/2/17 2/3/17





 15:00 23:00 07:00


 


Intake Total 100 ml 1015 ml 1125 ml


 


Output Total  1200 ml 1400 ml


 


Balance 100 ml -185 ml -275 ml











Results/Medications


Result Diagram:  


2/3/17 0508                                                                    

            2/3/17 0508





Results 24 hrs





Laboratory Tests








Test


  2/3/17


05:08


 


Anion Gap 18  H


 


Basophils # 0.0  


 


Basophils % 0.7  


 


Blood Morphology Comment   


 


Blood Urea Nitrogen 3  L


 


Calcium Level 9.1  


 


Carbon Dioxide Level 20  L


 


Chloride Level 109  


 


Creatinine 0.38  L


 


Eosinophils # 0.1  


 


Eosinophils % 0.9  


 


Glucose Level 53  #L


 


Hematocrit 30.5  L


 


Hemoglobin 10.2  L


 


Lymphocytes # 1.7  


 


Lymphocytes % 28.8  


 


Mean Corpuscular Hemoglobin 30.5  


 


Mean Corpuscular Hemoglobin


Concent 33.6  


 


 


Mean Corpuscular Volume 90.9  


 


Mean Platelet Volume 8.4  


 


Monocytes # 0.4  


 


Monocytes % 6.9  


 


Neutrophils # 3.8  


 


Neutrophils % 62.7  


 


Nucleated Red Blood Cells # 0.0  


 


Nucleated Red Blood Cells % 0.0  


 


Platelet Count 171  


 


Potassium Level 3.6  


 


Red Blood Count 3.36  L


 


Red Cell Distribution Width 15.8  H


 


Sodium Level 143  


 


White Blood Count 6.0  








Medications





 Current Medications


Meropenem (Merrem 1 Gm/100 ml (Pmx)) 100 ml @  200 mls/hr Q12 IVPB  Last 

administered on 2/3/17at 08:43; Admin Dose 200 MLS/HR;  Start 1/31/17 at 21:00


Morphine Sulfate (morphine) 2 mg Q3H  PRN IV PAIN LEVEL 6-10 Last administered 

on 2/2/17at 21:40; Admin Dose 2 MG;  Start 1/31/17 at 16:00


Mometasone Furoate (Asmanex) 1 puff BID INH  Last administered on 2/3/17at 08:38

; Admin Dose 1 PUFF;  Start 1/31/17 at 21:00


Montelukast Sodium (Singulair) 10 mg HS PO  Last administered on 2/2/17at 08:20

; Admin Dose 10 MG;  Start 1/31/17 at 21:00


Cyclosporine (Restasis) 1 drop BID BOTH EYES  Last administered on 2/3/17at 08:

34; Admin Dose 1 DROP;  Start 1/31/17 at 21:00


Diltiazem HCl 60 mg 60 mg QID PO  Last administered on 2/3/17at 08:52; Admin 

Dose 60 MG;  Start 1/31/17 at 17:00


Sodium Chloride (NS) 1,000 ml @  125 mls/hr Q8H IV  Last administered on 2/3/

17at 04:26; Admin Dose 125 MLS/HR;  Start 1/31/17 at 20:00


Fluticasone Propionate (Flonase 0.05% Nasal) 1 spray BID NASAL  Last 

administered on 2/3/17at 08:39; Admin Dose 1 SPRAY;  Start 1/31/17 at 21:00


Acetaminophen (Tylenol Liquid) 650 mg Q4H  PRN GTB PAIN AND OR ELEVATED TEMP;  

Start 1/31/17 at 20:00


Magnesium Oxide (Mag-Ox 400) 400 mg DAILY PO  Last administered on 2/3/17at 08:

40; Admin Dose 400 MG;  Start 2/1/17 at 09:00


Ondansetron HCl (Zofran Inj) 4 mg Q4H  PRN IV NAUSEA AND/OR VOMITING Last 

administered on 2/3/17at 08:50; Admin Dose 4 MG;  Start 1/31/17 at 20:00


Loratadine (Claritin) 10 mg DAILY PO  Last administered on 2/2/17at 08:21; 

Admin Dose 10 MG;  Start 2/1/17 at 09:00


Zolpidem Tartrate (Ambien) 5 mg HS  PRN PO INSOMNIA Last administered on 2/1/ 17at 21:56; Admin Dose 5 MG;  Start 1/31/17 at 20:00


Digoxin (Digoxin) 0.125 mg DAILY@13 PO  Last administered on 2/2/17at 08:20; 

Admin Dose 0.125 MG;  Start 2/1/17 at 13:00


Pantoprazole (Protonix Iv) 40 mg BID@06,18 IV  Last administered on 2/3/17at 06:

04; Admin Dose 40 MG;  Start 2/1/17 at 18:00


Hydralazine HCl (Apresoline) 10 mg Q6H  PRN IV ELEVATED BP;  Start 2/1/17 at 16:

00





Assessment/Plan


Chief Complaint/Hosp Course


Impression:


1. Diverticular Perforation





Plan:


1.  Discussed at length with Dr. Morris regarding timing of surgery.


2.  Choice of antibiotics per Dr. Rivera.


3.  Will discuss with Dr. Nieto.  Given the extended course to date, I would 

favor proceeding with surgery.  There is clearly a risk of needing a colostomy, 

however given her extended course of antibiotics I am concerned that even with 

additional antibiotics a colostomy may not be avoidable.  In addition I am 

worried that she could have a repeated episode of bleeding if we wait an 

extended period of time before proceeding with definitive surgery


4.  Will see intermittently at this point. Please call if problems develop in 

interim


Problems:  











ESA RATLIFF MD Feb 3, 2017 09:39

## 2017-02-03 NOTE — CONS
Date/Time of Note


Date/Time of Note


DATE: 2/3/17 


TIME: 10:54





Assessment/Plan


Assessment/Plan


Additional Assessment/Plan


Preoperative cardiac risk stratification


Diverticulitis perforation


Preserved ejection fraction


Paroxysmal atrial fibrillation


History of pacemaker





-Blood pressure trend overall has improved, she is intolerant to many 

medications and she does complain of occasional dizziness with standing. She 

does have when necessary hydralazine ordered. I would not not make any 

adjustments to her current cardiac medication regimen. Please refer to my 

consultation note February 1, 2017 discussing cardiac risk stratification.





Consultation Date/Type/Reason


Admit Date/Time


Jan 31, 2017 at 15:04


Initial Consult Date


2/1/17


Type of Consultation:  cv


Referring Provider:  AMPARO MAST MD-





24 HR Interval Summary


Free Text/Dictation


Patient very upset, denies chest pain, shortness of breath or palpitations





Exam/Review of Systems


Vital Signs


Vitals





 Vital Signs








  Date Time  Temp Pulse Resp B/P Pulse Ox O2 Delivery O2 Flow Rate FiO2


 


2/3/17 07:56 97.6 66 20 148/66 92   














 Intake and Output   


 


 2/2/17 2/2/17 2/3/17





 15:00 23:00 07:00


 


Intake Total 100 ml 1015 ml 1125 ml


 


Output Total  1200 ml 1400 ml


 


Balance 100 ml -185 ml -275 ml











Exam


Becomes upset at times during history taking and examination


Constitutional:  alert, frail, oriented


Head:  normocephalic


Neck:  supple


Respiratory:  other (course breath sounds bilaterally, no wheezing)


Cardiovascular:  other (S1 and S2 heard), regular rate and rhythm


Gastrointestinal:  bowel sounds, other (discomfort with palpation), soft


Extremities:  other (no edema)





Results


Result Diagram:  


2/3/17 0508                                                                    

            2/3/17 0508





Results 24 hrs





Laboratory Tests








Test


  2/3/17


05:08


 


Anion Gap 18  H


 


Basophils # 0.0  


 


Basophils % 0.7  


 


Blood Morphology Comment   


 


Blood Urea Nitrogen 3  L


 


Calcium Level 9.1  


 


Carbon Dioxide Level 20  L


 


Chloride Level 109  


 


Creatinine 0.38  L


 


Eosinophils # 0.1  


 


Eosinophils % 0.9  


 


Glucose Level 53  #L


 


Hematocrit 30.5  L


 


Hemoglobin 10.2  L


 


Lymphocytes # 1.7  


 


Lymphocytes % 28.8  


 


Mean Corpuscular Hemoglobin 30.5  


 


Mean Corpuscular Hemoglobin


Concent 33.6  


 


 


Mean Corpuscular Volume 90.9  


 


Mean Platelet Volume 8.4  


 


Monocytes # 0.4  


 


Monocytes % 6.9  


 


Neutrophils # 3.8  


 


Neutrophils % 62.7  


 


Nucleated Red Blood Cells # 0.0  


 


Nucleated Red Blood Cells % 0.0  


 


Platelet Count 171  


 


Potassium Level 3.6  


 


Red Blood Count 3.36  L


 


Red Cell Distribution Width 15.8  H


 


Sodium Level 143  


 


White Blood Count 6.0  











Medications


Medications





 Current Medications


Meropenem (Merrem 1 Gm/100 ml (Pmx)) 100 ml @  200 mls/hr Q12 IVPB  Last 

administered on 2/3/17at 08:43; Admin Dose 200 MLS/HR;  Start 1/31/17 at 21:00


Morphine Sulfate (morphine) 2 mg Q3H  PRN IV PAIN LEVEL 6-10 Last administered 

on 2/2/17at 21:40; Admin Dose 2 MG;  Start 1/31/17 at 16:00


Mometasone Furoate (Asmanex) 1 puff BID INH  Last administered on 2/3/17at 08:38

; Admin Dose 1 PUFF;  Start 1/31/17 at 21:00


Montelukast Sodium (Singulair) 10 mg HS PO  Last administered on 2/2/17at 08:20

; Admin Dose 10 MG;  Start 1/31/17 at 21:00


Cyclosporine (Restasis) 1 drop BID BOTH EYES  Last administered on 2/3/17at 08:

34; Admin Dose 1 DROP;  Start 1/31/17 at 21:00


Diltiazem HCl 60 mg 60 mg QID PO  Last administered on 2/3/17at 08:52; Admin 

Dose 60 MG;  Start 1/31/17 at 17:00


Sodium Chloride (NS) 1,000 ml @  75 mls/hr [id number] IV  Last administered on 2/3/

17at 04:26; Admin Dose 125 MLS/HR;  Start 1/31/17 at 20:00


Fluticasone Propionate (Flonase 0.05% Nasal) 1 spray BID NASAL  Last 

administered on 2/3/17at 08:39; Admin Dose 1 SPRAY;  Start 1/31/17 at 21:00


Acetaminophen (Tylenol Liquid) 650 mg Q4H  PRN GTB PAIN AND OR ELEVATED TEMP;  

Start 1/31/17 at 20:00


Magnesium Oxide (Mag-Ox 400) 400 mg DAILY PO  Last administered on 2/3/17at 08:

40; Admin Dose 400 MG;  Start 2/1/17 at 09:00


Ondansetron HCl (Zofran Inj) 4 mg Q4H  PRN IV NAUSEA AND/OR VOMITING Last 

administered on 2/3/17at 08:50; Admin Dose 4 MG;  Start 1/31/17 at 20:00


Loratadine (Claritin) 10 mg DAILY PO  Last administered on 2/2/17at 08:21; 

Admin Dose 10 MG;  Start 2/1/17 at 09:00


Zolpidem Tartrate (Ambien) 5 mg HS  PRN PO INSOMNIA Last administered on 2/1/ 17at 21:56; Admin Dose 5 MG;  Start 1/31/17 at 20:00


Digoxin (Digoxin) 0.125 mg DAILY@13 PO  Last administered on 2/2/17at 08:20; 

Admin Dose 0.125 MG;  Start 2/1/17 at 13:00


Pantoprazole (Protonix Iv) 40 mg BID@06,18 IV  Last administered on 2/3/17at 06:

04; Admin Dose 40 MG;  Start 2/1/17 at 18:00


Hydralazine HCl (Apresoline) 10 mg Q6H  PRN IV ELEVATED BP;  Start 2/1/17 at 16:

00











Sourav Goldman DO Feb 3, 2017 10:56

## 2017-02-03 NOTE — PN
DATE:  02/03/2017

 

 

MEDICAL PROGRESS NOTE

 

SUBJECTIVE:  The patient is having less abdominal pain, less back pain, but still has nausea.  The p
atient was feeling dizzy earlier today.  She still feels weak.  

 

OBJECTIVE:  

VITAL SIGNS:  Temperature 97.6, blood pressure 148/66, pulse 66, respirations 20, oxygen saturation 
92% on room air.

GENERAL:  Well-developed, well-nourished female in no acute distress.  

SKIN:  Mild tenting.  No rashes.  

CHEST:  Increased expiratory phase, otherwise clear to auscultation.

HEART:  Regular rate and rhythm.  No ectopy.

ABDOMEN:  Soft, nondistended, mild left lower quadrant tenderness with rebound tenderness as well.  
No masses noted.

EXTREMITIES:  No cyanosis, clubbing, or edema.

 

LABORATORY:  White blood cell count 6.0, hemoglobin 10.2, hematocrit is 30.5, platelets 171.  Sodium
 143, potassium 3.6, bicarbonate 20, chloride 109, BUN of 3, creatinine 0.38, blood sugar of 53, francois
cium 9.1.  

 

RADIOGRAPHIC DATA:  Abdominal and pelvic CT scan done 02/02/2017 shows sigmoid colon diverticulitis 
with localized diverticular perforations, stable to slightly improved when compared to prior.  No lo
culated or drainable abscess collection is seen.  Portable chest x-ray done shows mild atelectasis a
t the lung bases with small bilateral pleural effusions.

 

ASSESSMENT AND PLAN:

1.  Diverticulitis with microperforation.  The patient remained stable but will need definitive ther
apy to improve this.  The patient has failed conservative therapy with 5 weeks of antibiotics and ha
s no evidence of any abscess or worsening perforation at this time.  We will discuss with Dr. Yaron rock whether doing surgery this weekend or on Monday, but the patient needs definitive surgery in order
 to correct her problem.  The patient has not improved in the 5 weeks she has been on antibiotics, a
nd I do not see this as a viable course at this time.  The patient was scheduled for a contrast enem
a this morning but was in too much pain at the onset of it.  We will discuss with Dr. Morris regard
ing the need for this as there is a risk of further perforation and leak if she were to go through t
his, and it would not change the course of therapy which is surgery.  We will continue the antibioti
cs for now and pain medications and p.r.n. Zofran for nausea and vomiting.

2.  Paroxysmal atrial fibrillation/sick sinus syndrome/hypertension.  This remains stable.  The paola
ent is cleared by the cardiology for the potential for surgery.  We will continue the patient's medi
cations and p.r.n. hydralazine.

3.  Asthma.  This is stable.  We will continue the patient's medications, p.r.n. inhaler and nebuliz
er.

4.  Anemia.  The patient has no evidence of further bleeding since she has been here at the hospital
, but we will continue to monitor for this.

5.  Compression fracture of the back, stable.  The patient does have ongoing back pain, and we will 
continue with p.r.n. analgesics as well as physical therapy.

 

 

Dictated By: AMPARO MAST MD, SR/NTS

DD:    02/03/2017 14:04:16

DT:    02/03/2017 16:06:07

Conf#: [id number]

DID#:  [id number]

## 2017-02-04 VITALS — RESPIRATION RATE: 20 BRPM | DIASTOLIC BLOOD PRESSURE: 79 MMHG | SYSTOLIC BLOOD PRESSURE: 181 MMHG

## 2017-02-04 VITALS — RESPIRATION RATE: 18 BRPM | HEART RATE: 71 BPM | SYSTOLIC BLOOD PRESSURE: 157 MMHG | DIASTOLIC BLOOD PRESSURE: 66 MMHG

## 2017-02-04 VITALS — DIASTOLIC BLOOD PRESSURE: 66 MMHG | RESPIRATION RATE: 18 BRPM | SYSTOLIC BLOOD PRESSURE: 158 MMHG

## 2017-02-04 LAB
ALBUMIN SERPL-MCNC: 2.6 G/DL (ref 3.3–4.9)
ALBUMIN/GLOB SERPL: 1.13 {RATIO}
ALP SERPL-CCNC: 92 IU/L (ref 42–121)
ALT SERPL-CCNC: 22 IU/L (ref 13–69)
ANION GAP SERPL CALC-SCNC: 14 MMOL/L (ref 8–16)
AST SERPL-CCNC: 14 IU/L (ref 15–46)
BASOPHILS # BLD AUTO: 0.4 10^3/UL (ref 0–0.1)
BASOPHILS NFR BLD: 0.7 % (ref 0–2)
BILIRUB DIRECT SERPL-MCNC: 0 MG/DL (ref 0–0.2)
BILIRUB SERPL-MCNC: 0.2 MG/DL (ref 0.2–1.3)
BUN SERPL-MCNC: < 2 MG/DL (ref 7–20)
CALCIUM SERPL-MCNC: 9.3 MG/DL (ref 8.4–10.2)
CHLORIDE SERPL-SCNC: 109 MMOL/L (ref 97–110)
CO2 SERPL-SCNC: 25 MMOL/L (ref 21–31)
CREAT SERPL-MCNC: 0.39 MG/DL (ref 0.44–1)
EOSINOPHIL # BLD: 0.1 10^3/UL (ref 0–0.5)
EOSINOPHIL NFR BLD: 1.1 % (ref 0–7)
ERYTHROCYTE [DISTWIDTH] IN BLOOD BY AUTOMATED COUNT: 15.4 % (ref 11.5–14.5)
GLOBULIN SER-MCNC: 2.3 G/DL (ref 1.3–3.2)
GLUCOSE SERPL-MCNC: 68 MG/DL (ref 70–220)
HCT VFR BLD CALC: 31.2 % (ref 37–47)
HGB BLD-MCNC: 10 G/DL (ref 12–16)
LYMPHOCYTES # BLD AUTO: 1.8 10^3/UL (ref 0.8–2.9)
LYMPHOCYTES NFR BLD AUTO: 32.7 % (ref 15–51)
MCH RBC QN AUTO: 29.6 PG (ref 29–33)
MCHC RBC AUTO-ENTMCNC: 32.1 G/DL (ref 32–37)
MCV RBC AUTO: 92.3 FL (ref 82–101)
MONOCYTES # BLD: 0.5 10^3/UL (ref 0.3–0.9)
MONOCYTES NFR BLD: 8.7 % (ref 0–11)
NEUTROPHILS # BLD: 3.2 10^3/UL (ref 1.6–7.5)
NEUTROPHILS NFR BLD AUTO: 56.1 % (ref 39–77)
NRBC # BLD MANUAL: 0 10^3/UL (ref 0–0)
NRBC BLD QL: 0 /100WBC (ref 0–0)
PLATELET # BLD: 185 10^3/UL (ref 140–440)
PMV BLD AUTO: 10.8 FL (ref 7.4–10.4)
POTASSIUM SERPL-SCNC: 3.4 MMOL/L (ref 3.5–5.1)
PROT SERPL-MCNC: 4.9 G/DL (ref 6.1–8.1)
RBC # BLD AUTO: 3.38 10^6/UL (ref 4.2–5.4)
SODIUM SERPL-SCNC: 145 MMOL/L (ref 135–144)
WBC # BLD AUTO: 5.6 10^3/UL (ref 4.8–10.8)
WBC # BLD: 5.6 10^3/UL (ref 4.8–10.8)

## 2017-02-04 RX ADMIN — MAGNESIUM OXIDE TAB 400 MG (241.3 MG ELEMENTAL MG) SCH MG: 400 (241.3 MG) TAB at 10:11

## 2017-02-04 RX ADMIN — PANTOPRAZOLE SODIUM SCH MG: 40 INJECTION, POWDER, FOR SOLUTION INTRAVENOUS at 05:46

## 2017-02-04 RX ADMIN — MONTELUKAST SODIUM SCH MG: 10 TABLET, FILM COATED ORAL at 21:13

## 2017-02-04 RX ADMIN — DILTIAZEM HYDROCHLORIDE SCH MG: 60 TABLET, FILM COATED ORAL at 10:17

## 2017-02-04 RX ADMIN — POTASSIUM CHLORIDE SCH MLS/HR: 2 INJECTION, SOLUTION, CONCENTRATE INTRAVENOUS at 01:44

## 2017-02-04 RX ADMIN — LORATADINE SCH MG: 10 TABLET ORAL at 10:11

## 2017-02-04 RX ADMIN — MEROPENEM SCH MLS/HR: 1 INJECTION, POWDER, FOR SOLUTION INTRAVENOUS at 21:45

## 2017-02-04 RX ADMIN — MOMETASONE FUROATE SCH PUFF: 220 INHALANT RESPIRATORY (INHALATION) at 10:19

## 2017-02-04 RX ADMIN — DILTIAZEM HYDROCHLORIDE SCH MG: 60 TABLET, FILM COATED ORAL at 21:13

## 2017-02-04 RX ADMIN — DIGOXIN SCH MG: 125 TABLET ORAL at 14:43

## 2017-02-04 RX ADMIN — MOMETASONE FUROATE SCH PUFF: 220 INHALANT RESPIRATORY (INHALATION) at 21:12

## 2017-02-04 RX ADMIN — ZOLPIDEM TARTRATE PRN MG: 5 TABLET, FILM COATED ORAL at 23:33

## 2017-02-04 RX ADMIN — MEROPENEM SCH MLS/HR: 1 INJECTION, POWDER, FOR SOLUTION INTRAVENOUS at 10:04

## 2017-02-04 RX ADMIN — DILTIAZEM HYDROCHLORIDE SCH MG: 60 TABLET, FILM COATED ORAL at 14:44

## 2017-02-04 RX ADMIN — DILTIAZEM HYDROCHLORIDE SCH MG: 60 TABLET, FILM COATED ORAL at 18:57

## 2017-02-04 RX ADMIN — POTASSIUM CHLORIDE SCH MLS/HR: 2 INJECTION, SOLUTION, CONCENTRATE INTRAVENOUS at 18:55

## 2017-02-04 RX ADMIN — PANTOPRAZOLE SODIUM SCH MG: 40 INJECTION, POWDER, FOR SOLUTION INTRAVENOUS at 18:53

## 2017-02-04 NOTE — PN
DATE:  

 

 

SUBJECTIVE:  The patient is still having abdominal pain, but less.  The patient is very talkative.  
No complaints of nausea.  Complaining that she is not ambulating and would like physical therapy to 
come by.

 

OBJECTIVE

VITAL SIGNS:  Temperature is 98.2, blood pressure 157/66, pulse 71, respirations 18, pulse ox of 93 
on room air.

GENERAL:  Patient is in no acute distress.

CHEST:  Clear to auscultation bilaterally.

HEART:  Regular rate and rhythm.

ABDOMEN:  Soft, nondistended.  No rebound tenderness of the left lower quadrant.  Decreased bowel so
unds.

EXTREMITIES:  No edema or clubbing.

 

LABORATORY DATA:  Demonstrated potassium of 3.4 and a creatinine of 0.39.

 

ASSESSMENT AND PLAN:

1.  Diverticulitis with microperforation.  The patient saw the surgeon and has been started on a die
t to see if she is able to tolerate this.  In the past, the patient has failed this conservative kimberly
atment with 5 weeks of antibiotics.  Dr. Morris to follow.

2.  Paroxysmal atrial fibrillation, currently stable.

3.  Asthma, stable.

4.  Weakness.  Will have physical therapy consulted.

 

 

Dictated By: JULIENNE RAMOS/DEENA

DD:    02/04/2017 10:46:59

DT:    02/04/2017 10:56:27

Conf#: [id number]

DID#:  [id number]

## 2017-02-04 NOTE — PN
Date/Time of Note


Date/Time of Note


DATE: 2/4/17 


TIME: 16:17





Assessment/Plan


Lines/Catheters


IV Catheter Type (from Nrs):  PICC Line


Coffey in Place (from Nrs):  No





Assessment/Plan


Chief Complaint/Hosp Course


1. Microperforated diverticulitis, ? continued leak vs already sealed (old 

residual).  Patient refused water enema CT scan.  Symptomatically improved.  

Prefers to avoid colostomy if possible.


-iv abx


-clear liquid diet and advance as soon as she has bowel function.


-if pain worsens or clinically deteriorates will proceed with surgery


2. Anemia without evidence of acute blood loss


-Monitor


3. Paroxysmal atrial fibrillation currently in sinus.


-Optimize electrolytes


4. GERD


-Diet and lifestyle optimization


-PPI


5. Asthma


-Medical optimization


6. Hypertension


-Nutrition and medication optimization


7. Back pain with Vertebral compression fracture


-Physical therapy


-Pain control


8. History of sick sinus syndrome status post pacemaker


-Cardiac optimization





Thank you,


Problems:  





Subjective


24 Hr Interval Summary


Feels overall better. Started clears last night.  No abdominal pain unless 

palpated. No pain on ambulation.  No fevers or chills. No nausea vomiting. No 

chest pain or shortness of breath. No visual or neurologic changes. No dysuria. 

Still has back pain. No flatus or BM since admission.





Exam/Review of Systems


Vital Signs


Vitals





 Vital Signs








  Date Time  Temp Pulse Resp B/P Pulse Ox O2 Delivery O2 Flow Rate FiO2


 


2/4/17 10:23 98.2 71 18 157/66 93 Room Air  














 Intake and Output   


 


 2/3/17 2/3/17 2/4/17





 15:00 23:00 07:00


 


Intake Total 800 ml 300 ml 1025 ml


 


Output Total  4 ml 1000 ml


 


Balance 800 ml 296 ml 25 ml











Exam


Free Text/Dictation


Constitutional:  alert, oriented, No distress


Psych:  anxiety, No confusion


Head:  atraumatic, normocephalic


Eyes:  EOMI, PERRL, nl conjunctiva, No icteric


ENMT:  mucosa pink and moist, nl external ears & nose, nl lips & teeth


Neck:  non-tender, No jvd


Respiratory:  normal air movement, No congested cough, No labored breathing


Cardiovascular:  regular rate and rhythm, No edema


Gastrointestinal:  soft, minimal tenderness (4 out of 10) on palpation, No 

rebound or guarding, not rigid


Musculoskeletal:  nl extremities to inspection, nl gait and stance, No joint 

tenderness


Extremities:  normal pulses, No calf tenderness, No cyanosis, No edema


Neurological:  nl mental status, nl speech, nl strength


Skin:  nl turgor, No diaphoresis, No rash or lesions


Lymph:  nl lymph nodes





Results


Result Diagram:  


2/4/17 0525                                                                    

            2/4/17 0525














CHRISTOPHE ESCALONA MD Feb 4, 2017 16:18

## 2017-02-04 NOTE — QN
Documentation


Comment


Date/Time of Note


DATE: 2/3/17 


TIME: 19:48





Assessment/Plan


Assessment/Plan


1. Microperforated diverticulitis, ? continued leak vs already sealed (old 

residual).  Patient refused water enema CT scan.  Symptomatically improved.  

Prefers to avoid colostomy if possible.


-iv abx


-clear liquid diet


2. Anemia without evidence of acute blood loss


-Monitor


3. Paroxysmal atrial fibrillation currently in sinus.


-Optimize electrolytes


4. GERD


-Diet and lifestyle optimization


-PPI


5. Asthma


-Medical optimization


6. Hypertension


-Nutrition and medication optimization


7. Back pain with Vertebral compression fracture


-Physical therapy


-Pain control


8. History of sick sinus syndrome status post pacemaker


-Cardiac optimization





Thank you,








Subjective


Refused water enema CT.  No abdominal pain.  Hungry and thirsty. No fevers or 

chills. No nausea vomiting. No chest pain or shortness of breath. No visual or 

neurologic changes. No dysuria. Still has back pain.





Exam/Review of Systems


Vital Signs


avss





Exam


Constitutional:  alert, oriented, No distress


Psych:  anxiety, No confusion


Head:  atraumatic, normocephalic


Eyes:  EOMI, PERRL, nl conjunctiva, No icteric


ENMT:  mucosa pink and moist, nl external ears & nose, nl lips & teeth


Neck:  non-tender, No jvd


Respiratory:  normal air movement, No congested cough, No labored breathing


Cardiovascular:  regular rate and rhythm, No edema


Gastrointestinal:  soft, NT, No rebound or guarding, not rigid


Musculoskeletal:  nl extremities to inspection, nl gait and stance, No joint 

tenderness


Extremities:  normal pulses, No calf tenderness, No cyanosis, No edema


Neurological:  nl mental status, nl speech, nl strength


Skin:  nl turgor, No diaphoresis, No rash or lesions


Lymph:  nl lymph nodes





Results


noted











CHRISTOPHE ESCALONA MD Feb 4, 2017 16:17

## 2017-02-05 VITALS — SYSTOLIC BLOOD PRESSURE: 157 MMHG | RESPIRATION RATE: 20 BRPM | DIASTOLIC BLOOD PRESSURE: 70 MMHG

## 2017-02-05 VITALS — RESPIRATION RATE: 20 BRPM | SYSTOLIC BLOOD PRESSURE: 179 MMHG | DIASTOLIC BLOOD PRESSURE: 72 MMHG

## 2017-02-05 RX ADMIN — MOMETASONE FUROATE SCH PUFF: 220 INHALANT RESPIRATORY (INHALATION) at 09:52

## 2017-02-05 RX ADMIN — MEROPENEM SCH MLS/HR: 1 INJECTION, POWDER, FOR SOLUTION INTRAVENOUS at 20:50

## 2017-02-05 RX ADMIN — DIGOXIN SCH MG: 125 TABLET ORAL at 14:01

## 2017-02-05 RX ADMIN — DILTIAZEM HYDROCHLORIDE SCH MG: 60 TABLET, FILM COATED ORAL at 14:00

## 2017-02-05 RX ADMIN — MOMETASONE FUROATE SCH PUFF: 220 INHALANT RESPIRATORY (INHALATION) at 20:51

## 2017-02-05 RX ADMIN — MORPHINE SULFATE PRN MG: 2 INJECTION, SOLUTION INTRAMUSCULAR; INTRAVENOUS at 02:21

## 2017-02-05 RX ADMIN — MEROPENEM SCH MLS/HR: 1 INJECTION, POWDER, FOR SOLUTION INTRAVENOUS at 10:03

## 2017-02-05 RX ADMIN — PANTOPRAZOLE SODIUM SCH MG: 40 INJECTION, POWDER, FOR SOLUTION INTRAVENOUS at 17:34

## 2017-02-05 RX ADMIN — DILTIAZEM HYDROCHLORIDE SCH MG: 60 TABLET, FILM COATED ORAL at 17:06

## 2017-02-05 RX ADMIN — MONTELUKAST SODIUM SCH MG: 10 TABLET, FILM COATED ORAL at 20:51

## 2017-02-05 RX ADMIN — LORATADINE SCH MG: 10 TABLET ORAL at 10:02

## 2017-02-05 RX ADMIN — POTASSIUM CHLORIDE SCH MLS/HR: 2 INJECTION, SOLUTION, CONCENTRATE INTRAVENOUS at 09:52

## 2017-02-05 RX ADMIN — POTASSIUM CHLORIDE SCH MLS/HR: 2 INJECTION, SOLUTION, CONCENTRATE INTRAVENOUS at 17:20

## 2017-02-05 RX ADMIN — DILTIAZEM HYDROCHLORIDE SCH MG: 60 TABLET, FILM COATED ORAL at 20:51

## 2017-02-05 RX ADMIN — DILTIAZEM HYDROCHLORIDE SCH MG: 60 TABLET, FILM COATED ORAL at 10:02

## 2017-02-05 RX ADMIN — POTASSIUM CHLORIDE SCH MLS/HR: 2 INJECTION, SOLUTION, CONCENTRATE INTRAVENOUS at 07:00

## 2017-02-05 RX ADMIN — PANTOPRAZOLE SODIUM SCH MG: 40 INJECTION, POWDER, FOR SOLUTION INTRAVENOUS at 06:05

## 2017-02-05 RX ADMIN — MAGNESIUM OXIDE TAB 400 MG (241.3 MG ELEMENTAL MG) SCH MG: 400 (241.3 MG) TAB at 10:01

## 2017-02-05 RX ADMIN — POTASSIUM CHLORIDE SCH MLS/HR: 2 INJECTION, SOLUTION, CONCENTRATE INTRAVENOUS at 23:54

## 2017-02-05 NOTE — CONS
Date/Time of Note


Date/Time of Note


DATE: 2/5/17 


TIME: 15:04





Consult Date/Type/Reason


Admit Date/Time


Jan 31, 2017 at 15:04


Initial Consult Date


2/1/17


Type of Consultation:  GI


Ordering Provider:  AMPARO MAST MD-





Subjective


Patient tells me Dr Morris wants to delay surgery


Tolerating clear liquids and passing bm


In good spirits today with visitors present





Objective





 Vital Signs








  Date Time  Temp Pulse Resp B/P Pulse Ox O2 Delivery O2 Flow Rate FiO2


 


2/5/17 07:47 97.3 65 20 179/72 95   


 


2/4/17 10:23      Room Air  





Chest: clear to P and A


Cardiac: no m, r, g


Abdomen: soft, mild LLQ tenderness








 Intake and Output   


 


 2/4/17 2/4/17 2/5/17





 15:00 23:00 07:00


 


Intake Total 100 ml 3205 ml 240 ml


 


Balance 100 ml 3205 ml 240 ml











Results/Medications


Result Diagram:  


2/4/17 0525                                                                    

            2/4/17 0525





Medications





 Current Medications


Meropenem (Merrem 1 Gm/100 ml (Pmx)) 100 ml @  200 mls/hr Q12 IVPB  Last 

administered on 2/5/17at 10:03; Admin Dose 200 MLS/HR;  Start 1/31/17 at 21:00


Morphine Sulfate (morphine) 2 mg Q3H  PRN IV PAIN LEVEL 6-10 Last administered 

on 2/5/17at 02:21; Admin Dose 2 MG;  Start 1/31/17 at 16:00


Mometasone Furoate (Asmanex) 1 puff BID INH  Last administered on 2/5/17at 09:52

; Admin Dose 1 PUFF;  Start 1/31/17 at 21:00


Montelukast Sodium (Singulair) 10 mg HS PO  Last administered on 2/4/17at 21:13

; Admin Dose 10 MG;  Start 1/31/17 at 21:00


Cyclosporine (Restasis) 1 drop BID BOTH EYES  Last administered on 2/5/17at 10:

01; Admin Dose 1 DROP;  Start 1/31/17 at 21:00


Diltiazem HCl (Cardizem) 60 mg QID PO  Last administered on 2/5/17at 14:00; 

Admin Dose 60 MG;  Start 1/31/17 at 17:00


Fluticasone Propionate (Flonase 0.05% Nasal) 1 spray BID NASAL  Last 

administered on 2/5/17at 09:52; Admin Dose 1 SPRAY;  Start 1/31/17 at 21:00


Acetaminophen (Tylenol Liquid) 650 mg Q4H  PRN GTB PAIN AND OR ELEVATED TEMP;  

Start 1/31/17 at 20:00


Magnesium Oxide (Mag-Ox 400) 400 mg DAILY PO  Last administered on 2/5/17at 10:

01; Admin Dose 400 MG;  Start 2/1/17 at 09:00


Ondansetron HCl (Zofran Inj) 4 mg Q4H  PRN IV NAUSEA AND/OR VOMITING Last 

administered on 2/3/17at 08:50; Admin Dose 4 MG;  Start 1/31/17 at 20:00


Loratadine (Claritin) 10 mg DAILY PO  Last administered on 2/5/17at 10:02; 

Admin Dose 10 MG;  Start 2/1/17 at 09:00


Zolpidem Tartrate (Ambien) 5 mg HS  PRN PO INSOMNIA Last administered on 2/4/ 17at 23:33; Admin Dose 5 MG;  Start 1/31/17 at 20:00


Digoxin (Digoxin) 0.125 mg DAILY@13 PO  Last administered on 2/5/17at 14:01; 

Admin Dose 0.125 MG;  Start 2/1/17 at 13:00


Pantoprazole (Protonix Iv) 40 mg BID@06,18 IV  Last administered on 2/5/17at 06:

05; Admin Dose 40 MG;  Start 2/1/17 at 18:00


Hydralazine HCl 10 mg 10 mg Q6H  PRN IV ELEVATED BP;  Start 2/1/17 at 16:00


Dextrose/Sodium Chloride (D5-NS) 1,000 ml @  75 mls/hr [id number] IV  Last 

administered on 2/5/17at 09:52; Admin Dose 75 MLS/HR;  Start 2/3/17 at 12:00





Assessment/Plan


Chief Complaint/Hosp Course


Impression:


1. Diverticular Perforation





Plan:


1.  Timing of surgery per Dr Morris


2.  Antibiotics per Dr. Rivera


3.  Will see intermittently at this point. Please call if problems develop in 

interim


Problems:  











ESA RATLIFF MD Feb 5, 2017 15:08

## 2017-02-05 NOTE — PN
Date/Time of Note


Date/Time of Note


DATE: 2/5/17 


TIME: 16:08





Assessment/Plan


Lines/Catheters


IV Catheter Type (from Nrs):  PICC Line


Coffey in Place (from Nrs):  No





Assessment/Plan


Chief Complaint/Hosp Course


1. Microperforated diverticulitis, ? continued leak vs already sealed (old 

residual).  Patient refused water enema CT scan.  Symptomatically improved.  

Prefers to avoid colostomy if possible.  Improving.  


-iv abx


-advance diet as tolerated


-if pain worsens or clinically deteriorates will proceed with surgery


2. Anemia without evidence of acute blood loss


-Monitor


3. Paroxysmal atrial fibrillation currently in sinus.


-Optimize electrolytes


4. GERD


-Diet and lifestyle optimization


-PPI


5. Asthma


-Medical optimization


6. Hypertension


-Nutrition and medication optimization


7. Back pain with Vertebral compression fracture


-Physical therapy


-Pain control


8. History of sick sinus syndrome status post pacemaker


-Cardiac optimization





Thank you,


Problems:  





Subjective


24 Hr Interval Summary


Feels overall better. Started clears last night.  No abdominal pain unless 

palpated. No pain on ambulation.  No fevers or chills. No nausea vomiting. No 

chest pain or shortness of breath. No visual or neurologic changes. No dysuria. 

Still has back pain. Flatus and bm.





Exam/Review of Systems


Vital Signs


Vitals





 Vital Signs








  Date Time  Temp Pulse Resp B/P Pulse Ox O2 Delivery O2 Flow Rate FiO2


 


2/5/17 07:47 97.3 65 20 179/72 95   


 


2/4/17 10:23      Room Air  














 Intake and Output   


 


 2/4/17 2/4/17 2/5/17





 15:00 23:00 07:00


 


Intake Total 100 ml 3205 ml 240 ml


 


Balance 100 ml 3205 ml 240 ml











Exam


Free Text/Dictation


Constitutional:  alert, oriented, No distress


Psych:  anxiety, No confusion


Head:  atraumatic, normocephalic


Eyes:  EOMI, PERRL, nl conjunctiva, No icteric


ENMT:  mucosa pink and moist, nl external ears & nose, nl lips & teeth


Neck:  non-tender, No jvd


Respiratory:  normal air movement, No congested cough, No labored breathing


Cardiovascular:  regular rate and rhythm, No edema


Gastrointestinal:  soft, minimal tenderness (4 out of 10) on palpation, No 

rebound or guarding, not rigid


Musculoskeletal:  nl extremities to inspection, nl gait and stance, No joint 

tenderness


Extremities:  normal pulses, No calf tenderness, No cyanosis, No edema


Neurological:  nl mental status, nl speech, nl strength


Skin:  nl turgor, No diaphoresis, No rash or lesions


Lymph:  nl lymph nodes





Results


Result Diagram:  


2/4/17 0525                                                                    

            2/4/17 0525














CHRISTOPHE ESCALONA MD Feb 5, 2017 16:10

## 2017-02-05 NOTE — PN
DATE:  02/05/2017

 

 

SUBJECTIVE:  The patient was able to tolerate p.o. intake yesterday and this morning, had a bowel mo
vement today, but still complains of having right upper quadrant pain which did improve after the cristina
wel movement.  Denies having left lower quadrant pain.  Started to walk.  Nursing was taking the pat
ient to walk around the area.  The patient seems to be in good spirits.  

 

OBJECTIVE:  

VITAL SIGNS:  Blood pressure 179/72, temperature 97.3, pulse is 65, respirations at 20, pulse ox is 
95 on room air.

HEENT:  Normocephalic, atraumatic.

EYES:  Pupils equal, round, and reactive to light and accommodation.

HEART:  Regular rate and rhythm.

LUNGS:  Clear to auscultation bilaterally.

ABDOMEN:  No rebound tenderness.  No palpable masses.

 

ASSESSMENT AND PLAN:

1.  Diverticulitis with microperforation.  Surgeons have been following the patient, and it is repor
marisa that if she is able to tolerate p.o. intake and no increase of severe pain, then there is no nee
d for the surgery.  The patient is still on her antibiotics.  

2.  Paroxysmal atrial fibrillation, currently stable.

3.  Hypertension.  Blood pressure is elevated.  She is now taking p.o. intake.  She should be able t
o take her medications that are p.o. q.i.d. 

4.  Weakness.  Physical therapy via the nurse has started.  The patient is beginning to walk around 
the nurses' station.

 

 

Dictated By: JULIENNE RAMOS/DEENA

DD:    02/05/2017 09:45:10

DT:    02/05/2017 11:11:02

Conf#: [id number]

DID#:  [id number]

## 2017-02-06 VITALS — SYSTOLIC BLOOD PRESSURE: 176 MMHG | RESPIRATION RATE: 20 BRPM | DIASTOLIC BLOOD PRESSURE: 77 MMHG

## 2017-02-06 VITALS — DIASTOLIC BLOOD PRESSURE: 69 MMHG | HEART RATE: 70 BPM | SYSTOLIC BLOOD PRESSURE: 155 MMHG

## 2017-02-06 VITALS — DIASTOLIC BLOOD PRESSURE: 65 MMHG | SYSTOLIC BLOOD PRESSURE: 144 MMHG | RESPIRATION RATE: 18 BRPM

## 2017-02-06 LAB
ANION GAP SERPL CALC-SCNC: 12 MMOL/L (ref 8–16)
BASOPHILS # BLD AUTO: 0 10^3/UL (ref 0–0.1)
BASOPHILS NFR BLD: 0.5 % (ref 0–2)
BUN SERPL-MCNC: < 2 MG/DL (ref 7–20)
CALCIUM SERPL-MCNC: 9.3 MG/DL (ref 8.4–10.2)
CHLORIDE SERPL-SCNC: 110 MMOL/L (ref 97–110)
CO2 SERPL-SCNC: 26 MMOL/L (ref 21–31)
CONDITION: 1
CREAT SERPL-MCNC: 0.34 MG/DL (ref 0.44–1)
EOSINOPHIL # BLD: 0.1 10^3/UL (ref 0–0.5)
EOSINOPHIL NFR BLD: 1 % (ref 0–7)
ERYTHROCYTE [DISTWIDTH] IN BLOOD BY AUTOMATED COUNT: 17 % (ref 11.5–14.5)
GLUCOSE SERPL-MCNC: 93 MG/DL (ref 70–220)
HCT VFR BLD CALC: 33.4 % (ref 37–47)
HGB BLD-MCNC: 11.2 G/DL (ref 12–16)
LH ANALYZER COMMENTS: 1
LYMPHOCYTES # BLD AUTO: 1.9 10^3/UL (ref 0.8–2.9)
LYMPHOCYTES NFR BLD AUTO: 35 % (ref 15–51)
MCH RBC QN AUTO: 30.5 PG (ref 29–33)
MCHC RBC AUTO-ENTMCNC: 33.7 G/DL (ref 32–37)
MCV RBC AUTO: 90.4 FL (ref 82–101)
MONOCYTES # BLD: 0.5 10^3/UL (ref 0.3–0.9)
MONOCYTES NFR BLD: 10 % (ref 0–11)
NEUTROPHILS # BLD: 2.9 10^3/UL (ref 1.6–7.5)
NEUTROPHILS NFR BLD AUTO: 53.5 % (ref 39–77)
NRBC # BLD MANUAL: 0 10^3/UL (ref 0–0)
NRBC BLD QL: 0 /100WBC (ref 0–0)
PLATELET # BLD: 197 10^3/UL (ref 140–440)
PMV BLD AUTO: 8.3 FL (ref 7.4–10.4)
POTASSIUM SERPL-SCNC: 3.1 MMOL/L (ref 3.5–5.1)
RBC # BLD AUTO: 3.69 10^6/UL (ref 4.2–5.4)
SODIUM SERPL-SCNC: 145 MMOL/L (ref 135–144)
WBC # BLD AUTO: 5.4 10^3/UL (ref 4.8–10.8)
WBC # BLD: 5.4 10^3/UL (ref 4.8–10.8)

## 2017-02-06 RX ADMIN — DILTIAZEM HYDROCHLORIDE SCH MG: 60 TABLET, FILM COATED ORAL at 17:42

## 2017-02-06 RX ADMIN — PANTOPRAZOLE SODIUM SCH MG: 40 INJECTION, POWDER, FOR SOLUTION INTRAVENOUS at 17:41

## 2017-02-06 RX ADMIN — MOMETASONE FUROATE SCH PUFF: 220 INHALANT RESPIRATORY (INHALATION) at 08:28

## 2017-02-06 RX ADMIN — MEROPENEM SCH MLS/HR: 1 INJECTION, POWDER, FOR SOLUTION INTRAVENOUS at 21:46

## 2017-02-06 RX ADMIN — MAGNESIUM OXIDE TAB 400 MG (241.3 MG ELEMENTAL MG) SCH MG: 400 (241.3 MG) TAB at 08:27

## 2017-02-06 RX ADMIN — LORATADINE SCH MG: 10 TABLET ORAL at 08:27

## 2017-02-06 RX ADMIN — POTASSIUM CHLORIDE SCH MLS/HR: 2 INJECTION, SOLUTION, CONCENTRATE INTRAVENOUS at 20:00

## 2017-02-06 RX ADMIN — MOMETASONE FUROATE SCH PUFF: 220 INHALANT RESPIRATORY (INHALATION) at 21:47

## 2017-02-06 RX ADMIN — POTASSIUM CHLORIDE SCH MLS/HR: 2 INJECTION, SOLUTION, CONCENTRATE INTRAVENOUS at 13:02

## 2017-02-06 RX ADMIN — PANTOPRAZOLE SODIUM SCH MG: 40 INJECTION, POWDER, FOR SOLUTION INTRAVENOUS at 05:57

## 2017-02-06 RX ADMIN — DEXAMETHASONE SODIUM PHOSPHATE PRN MG: 10 INJECTION, SOLUTION INTRAMUSCULAR; INTRAVENOUS at 13:01

## 2017-02-06 RX ADMIN — DIGOXIN SCH MG: 125 TABLET ORAL at 12:55

## 2017-02-06 RX ADMIN — DILTIAZEM HYDROCHLORIDE SCH MG: 60 TABLET, FILM COATED ORAL at 21:46

## 2017-02-06 RX ADMIN — MONTELUKAST SODIUM SCH MG: 10 TABLET, FILM COATED ORAL at 21:46

## 2017-02-06 RX ADMIN — DILTIAZEM HYDROCHLORIDE SCH MG: 60 TABLET, FILM COATED ORAL at 12:54

## 2017-02-06 RX ADMIN — DILTIAZEM HYDROCHLORIDE SCH MG: 60 TABLET, FILM COATED ORAL at 08:27

## 2017-02-06 RX ADMIN — MEROPENEM SCH MLS/HR: 1 INJECTION, POWDER, FOR SOLUTION INTRAVENOUS at 08:31

## 2017-02-06 RX ADMIN — POTASSIUM CHLORIDE SCH MLS/HR: 2 INJECTION, SOLUTION, CONCENTRATE INTRAVENOUS at 06:40

## 2017-02-06 NOTE — PN
Date/Time of Note


Date/Time of Note


DATE: 2/6/17 


TIME: 13:09





Assessment/Plan


Lines/Catheters


IV Catheter Type (from Nrs):  PICC Line


Coffey in Place (from Nrs):  No





Assessment/Plan


Chief Complaint/Hosp Course


1. Microperforated diverticulitis, ? continued leak vs already sealed (old 

residual).  Patient refused water enema CT scan.  Symptomatically improved.  

Prefers to avoid colostomy if possible.  Improving.  


-iv abx


-advance diet as tolerated


-if pain worsens or clinically deteriorates will proceed with surgery


2. Anemia without evidence of acute blood loss


-Monitor


3. Paroxysmal atrial fibrillation currently in sinus.


-Optimize electrolytes


4. GERD


-Diet and lifestyle optimization


-PPI


5. Asthma


-Medical optimization


6. Hypertension


-Nutrition and medication optimization


7. Back pain with Vertebral compression fracture


-Physical therapy


-Pain control


8. History of sick sinus syndrome status post pacemaker


-Cardiac optimization





Thank you,


Problems:  





Subjective


24 Hr Interval Summary


On fulls.  Min nausea without vomiting.  Pain LLQ.  No fevers or chills. No 

nausea vomiting. No chest pain or shortness of breath. No visual or neurologic 

changes. No dysuria. Min back pain. Flatus and bm.





Exam/Review of Systems


Vital Signs


Vitals





 Vital Signs








  Date Time  Temp Pulse Resp B/P Pulse Ox O2 Delivery O2 Flow Rate FiO2


 


2/6/17 12:55  70  155/69    


 


2/6/17 07:39 98.5  20  95   


 


2/4/17 10:23      Room Air  














 Intake and Output   


 


 2/5/17 2/5/17 2/6/17





 15:00 23:00 07:00


 


Intake Total 1100 ml 1907 ml 1250 ml


 


Output Total  1300 ml 1900 ml


 


Balance 1100 ml 607 ml -650 ml











Exam


Free Text/Dictation


Constitutional:  alert, oriented, No distress


Psych:  anxiety, No confusion


Head:  atraumatic, normocephalic


Eyes:  EOMI, PERRL, nl conjunctiva, No icteric


ENMT:  mucosa pink and moist, nl external ears & nose, nl lips & teeth


Neck:  non-tender, No jvd


Respiratory:  normal air movement, No congested cough, No labored breathing


Cardiovascular:  regular rate and rhythm, No edema


Gastrointestinal:  soft, minimal tenderness LLQ without rebound or guarding, 

not rigid


Musculoskeletal:  nl extremities to inspection, nl gait and stance, No joint 

tenderness


Extremities:  normal pulses, No calf tenderness, No cyanosis, No edema


Neurological:  nl mental status, nl speech, nl strength


Skin:  nl turgor, No diaphoresis, No rash or lesions


Lymph:  nl lymph nodes





Results


Result Diagram:  


2/6/17 0556                                                                    

            2/6/17 0556














CHRISTOPHE ESCALONA MD Feb 6, 2017 13:15

## 2017-02-06 NOTE — CONS
Date/Time of Note


Date/Time of Note


DATE: 2/6/17 


TIME: 08:05





Assessment/Plan


Assessment/Plan


Chief Complaint/Hosp Course





1)diverticulitis with contained perforation


no drainable abscess


pt has been on almost 4 weeks of antibiotics, doubtful that antibiotics alone 

will be curative


no evidence to suggest that she has failed antibiotics due to resistance to 

current antibiotic (no F, C, NS, elevated wbc)


continue with merrem


if patient develops fever or condition worsens then would consider adding vanco 

for enterococcal coverage, merrem has great anaerobic coverage


await surgical input for definitive treatment


2/2 - pt to get abd/pelv CT with rectal contrast to evaluate for continued leak


continue with merrem at present


2/3 - pt refused rectal contrast due to pain, consideration for sedation is 

being entertained


no change from ID perspective


doubt antibiotics alone will be curative





2/6 - continue with merrem, if no surgery is planned would continue merrem thru 

3/2/17, this is an 8 week course


pt will need follow-up CT abd in a few weeks





2) multiple drug allergies


thankfully patient is tolerating the merrem





3) paroxysmal a-fib with pacer


pt has been on coumadin but not currently and INR is good


Problems:  





Consultation Date/Type/Reason


Admit Date/Time


Jan 31, 2017 at 15:04


Initial Consult Date


2/1/17


Type of Consultation:  ID


Referring Provider:  AMPARO MAST MD-





24 HR Interval Summary


Free Text/Dictation


pt is slowly improving with merrem


no V, D, SOB


abd pain is better





Exam/Review of Systems


Vital Signs


Vitals





 Vital Signs








  Date Time  Temp Pulse Resp B/P Pulse Ox O2 Delivery O2 Flow Rate FiO2


 


2/6/17 07:39 98.5 69 20 176/77 95   


 


2/4/17 10:23      Room Air  














 Intake and Output   


 


 2/5/17 2/5/17 2/6/17





 15:00 23:00 07:00


 


Intake Total 1100 ml 1907 ml 1250 ml


 


Output Total  1300 ml 1900 ml


 


Balance 1100 ml 607 ml -650 ml











Exam


Constitutional:  alert, oriented


Head:  normocephalic


Eyes:  nl conjunctiva


ENMT:  mucosa pink and moist


Respiratory:  clear to auscultation


Cardiovascular:  regular rate and rhythm


Gastrointestinal:  non-tender, soft


Extremities:  other (L ankle swelling without redness)





Results


Result Diagram:  


2/6/17 0556                                                                    

            2/6/17 0556





Results 24 hrs





Laboratory Tests








Test


  2/6/17


05:56


 


Anion Gap 12  


 


Basophils # 0.0  


 


Basophils % 0.5  


 


Blood Morphology Comment   


 


Blood Urea Nitrogen < 2  L


 


Calcium Level 9.3  


 


Carbon Dioxide Level 26  


 


Chloride Level 110  


 


Creatinine 0.34  L


 


Eosinophils # 0.1  


 


Eosinophils % 1.0  


 


Glucose Level 93  


 


Hematocrit 33.4  L


 


Hemoglobin 11.2  L


 


Lymphocytes # 1.9  


 


Lymphocytes % 35.0  


 


Mean Corpuscular Hemoglobin 30.5  


 


Mean Corpuscular Hemoglobin


Concent 33.7  


 


 


Mean Corpuscular Volume 90.4  


 


Mean Platelet Volume 8.3  #


 


Monocytes # 0.5  


 


Monocytes % 10.0  


 


Neutrophils # 2.9  


 


Neutrophils % 53.5  


 


Nucleated Red Blood Cells # 0.0  


 


Nucleated Red Blood Cells % 0.0  


 


Platelet Count 197  


 


Potassium Level 3.1  L


 


Red Blood Count 3.69  L


 


Red Cell Distribution Width 17.0  H


 


Sodium Level 145  H


 


White Blood Count 5.4  











Medications


Medications





 Current Medications


Meropenem (Merrem 1 Gm/100 ml (Pmx)) 100 ml @  200 mls/hr Q12 IVPB  Last 

administered on 2/5/17at 20:50; Admin Dose 200 MLS/HR;  Start 1/31/17 at 21:00


Morphine Sulfate (morphine) 2 mg Q3H  PRN IV PAIN LEVEL 6-10 Last administered 

on 2/5/17at 02:21; Admin Dose 2 MG;  Start 1/31/17 at 16:00


Mometasone Furoate (Asmanex) 1 puff BID INH  Last administered on 2/5/17at 20:51

; Admin Dose 1 PUFF;  Start 1/31/17 at 21:00


Montelukast Sodium (Singulair) 10 mg HS PO  Last administered on 2/5/17at 20:51

; Admin Dose 10 MG;  Start 1/31/17 at 21:00


Cyclosporine (Restasis) 1 drop BID BOTH EYES  Last administered on 2/5/17at 20:

51; Admin Dose 1 DROP;  Start 1/31/17 at 21:00


Diltiazem HCl (Cardizem) 60 mg QID PO  Last administered on 2/5/17at 20:51; 

Admin Dose 60 MG;  Start 1/31/17 at 17:00


Fluticasone Propionate (Flonase 0.05% Nasal) 1 spray BID NASAL  Last 

administered on 2/5/17at 20:50; Admin Dose 1 SPRAY;  Start 1/31/17 at 21:00


Acetaminophen (Tylenol Liquid) 650 mg Q4H  PRN GTB PAIN AND OR ELEVATED TEMP;  

Start 1/31/17 at 20:00


Magnesium Oxide (Mag-Ox 400) 400 mg DAILY PO  Last administered on 2/5/17at 10:

01; Admin Dose 400 MG;  Start 2/1/17 at 09:00


Ondansetron HCl (Zofran Inj) 4 mg Q4H  PRN IV NAUSEA AND/OR VOMITING Last 

administered on 2/3/17at 08:50; Admin Dose 4 MG;  Start 1/31/17 at 20:00


Loratadine (Claritin) 10 mg DAILY PO  Last administered on 2/5/17at 10:02; 

Admin Dose 10 MG;  Start 2/1/17 at 09:00


Zolpidem Tartrate (Ambien) 5 mg HS  PRN PO INSOMNIA Last administered on 2/4/ 17at 23:33; Admin Dose 5 MG;  Start 1/31/17 at 20:00


Digoxin (Digoxin) 0.125 mg DAILY@13 PO  Last administered on 2/5/17at 14:01; 

Admin Dose 0.125 MG;  Start 2/1/17 at 13:00


Pantoprazole (Protonix Iv) 40 mg BID@06,18 IV  Last administered on 2/6/17at 05:

57; Admin Dose 40 MG;  Start 2/1/17 at 18:00


Hydralazine HCl 10 mg 10 mg Q6H  PRN IV ELEVATED BP;  Start 2/1/17 at 16:00


Dextrose/Sodium Chloride (D5-NS) 1,000 ml @  75 mls/hr [id number] IV  Last 

administered on 2/5/17at 23:54; Admin Dose 75 MLS/HR;  Start 2/3/17 at 12:00











PHUC COOLEY MD Feb 6, 2017 08:10

## 2017-02-06 NOTE — SP
DATE OF PROCEDURE:  02/06/2017

 

 

SUBJECTIVE:  The patient is feeling better, having less abdominal and back pain, tolerating her meal
 better.  

 

OBJECTIVE:  

VITAL SIGNS:  Temperature 98.5, pulse 70, blood pressure 155/69, oxygen saturation 95% on room air.

GENERAL:  Well-developed, well-nourished female in no acute distress.

LUNGS:  Decreased breath sounds at bilateral bases with increased expiratory phase, otherwise clear.

HEART:  Regular rate and rhythm.

ABDOMEN:  Soft, nondistended, mild left lower quadrant tenderness, no rebound tenderness, no masses.

EXTREMITIES:  No cyanosis, clubbing, or edema.

 

LABORATORY EXAMINATION:  White blood cell count 5.4, hemoglobin of 11.2, hematocrit of 33.4, platele
ts 197.  Potassium is 3.1, chloride of 110, bicarbonate 26, sodium 145, BUN of less than 2, creatini
ne of 0.34, a blood sugar of 93.

 

ASSESSMENT AND PLAN:  

1.  Perforated sigmoid colon, diverticulitis.  The patient is doing better and will continue with co
nservative management in order to avoid a 2-step surgery that is likely, if we were to do surgery no
w, with a probable colostomy.  We will continue with conservative management.  Advance diet and p.r.
n. pain medications:

2.  Atrial fibrillation, hypertension.  We will continue patient's routine medications.

3.  Asthma, this is stable.  Continue this patient's medications and p.r.n. nebulizer therapy.

4.  Anemia.  This has remained stable and the patient has had no further evidence of bleeding.

5.  Low potassium.  Will replace with potassium supplements.

6.  Debility.  Compression fractures _____.  The patient may be a candidate for acute rehabilitation
.  We will get an acute rehabilitation consultation to see whether or not she would fit the criteria
 for being transferred to acute rehab at some later date.

 

 

Dictated By: AMPARO MAST MD

 

SR/DEENA

DD:    02/06/2017 13:33:45

DT:    02/06/2017 14:47:23

Conf#: [id number]

DID#:  [id number]

## 2017-02-06 NOTE — CONS
Date/Time of Note


Date/Time of Note


DATE: 2/6/17 


TIME: 11:57





Consult Date/Type/Reason


Admit Date/Time


Jan 31, 2017 at 15:04


Initial Consult Date


2/1/17


Type of Consultation:  GI


Ordering Provider:  AMPARO MAST MD-





Subjective


Tolerating diet


No further bm


Accepting of surgery





Objective





 Vital Signs








  Date Time  Temp Pulse Resp B/P Pulse Ox O2 Delivery O2 Flow Rate FiO2


 


2/6/17 07:39 98.5 69 20 176/77 95   


 


2/4/17 10:23      Room Air  





Abdomen: soft, minimal LLQ tenderness, +bs








 Intake and Output   


 


 2/5/17 2/5/17 2/6/17





 15:00 23:00 07:00


 


Intake Total 1100 ml 1907 ml 1250 ml


 


Output Total  1300 ml 1900 ml


 


Balance 1100 ml 607 ml -650 ml











Results/Medications


Result Diagram:  


2/6/17 0556                                                                    

            2/6/17 0556





Results 24 hrs





Laboratory Tests








Test


  2/6/17


05:56


 


Anion Gap 12  


 


Basophils # 0.0  


 


Basophils % 0.5  


 


Blood Morphology Comment   


 


Blood Urea Nitrogen < 2  L


 


Calcium Level 9.3  


 


Carbon Dioxide Level 26  


 


Chloride Level 110  


 


Creatinine 0.34  L


 


Eosinophils # 0.1  


 


Eosinophils % 1.0  


 


Glucose Level 93  


 


Hematocrit 33.4  L


 


Hemoglobin 11.2  L


 


Lymphocytes # 1.9  


 


Lymphocytes % 35.0  


 


Mean Corpuscular Hemoglobin 30.5  


 


Mean Corpuscular Hemoglobin


Concent 33.7  


 


 


Mean Corpuscular Volume 90.4  


 


Mean Platelet Volume 8.3  #


 


Monocytes # 0.5  


 


Monocytes % 10.0  


 


Neutrophils # 2.9  


 


Neutrophils % 53.5  


 


Nucleated Red Blood Cells # 0.0  


 


Nucleated Red Blood Cells % 0.0  


 


Platelet Count 197  


 


Potassium Level 3.1  L


 


Red Blood Count 3.69  L


 


Red Cell Distribution Width 17.0  H


 


Sodium Level 145  H


 


White Blood Count 5.4  








Medications





 Current Medications


Meropenem (Merrem 1 Gm/100 ml (Pmx)) 100 ml @  200 mls/hr Q12 IVPB  Last 

administered on 2/6/17at 08:31; Admin Dose 200 MLS/HR;  Start 1/31/17 at 21:00


Morphine Sulfate (morphine) 2 mg Q3H  PRN IV PAIN LEVEL 6-10 Last administered 

on 2/5/17at 02:21; Admin Dose 2 MG;  Start 1/31/17 at 16:00


Mometasone Furoate (Asmanex) 1 puff BID INH  Last administered on 2/6/17at 08:28

; Admin Dose 1 PUFF;  Start 1/31/17 at 21:00


Montelukast Sodium (Singulair) 10 mg HS PO  Last administered on 2/5/17at 20:51

; Admin Dose 10 MG;  Start 1/31/17 at 21:00


Cyclosporine (Restasis) 1 drop BID BOTH EYES  Last administered on 2/6/17at 10:

18; Admin Dose 1 DROP;  Start 1/31/17 at 21:00


Diltiazem HCl (Cardizem) 60 mg QID PO  Last administered on 2/6/17at 08:27; 

Admin Dose 60 MG;  Start 1/31/17 at 17:00


Fluticasone Propionate (Flonase 0.05% Nasal) 1 spray BID NASAL  Last 

administered on 2/6/17at 08:28; Admin Dose 1 SPRAY;  Start 1/31/17 at 21:00


Acetaminophen (Tylenol Liquid) 650 mg Q4H  PRN GTB PAIN AND OR ELEVATED TEMP;  

Start 1/31/17 at 20:00


Magnesium Oxide (Mag-Ox 400) 400 mg DAILY PO  Last administered on 2/6/17at 08:

27; Admin Dose 400 MG;  Start 2/1/17 at 09:00


Ondansetron HCl (Zofran Inj) 4 mg Q4H  PRN IV NAUSEA AND/OR VOMITING Last 

administered on 2/3/17at 08:50; Admin Dose 4 MG;  Start 1/31/17 at 20:00


Loratadine (Claritin) 10 mg DAILY PO  Last administered on 2/6/17at 08:27; 

Admin Dose 10 MG;  Start 2/1/17 at 09:00


Zolpidem Tartrate (Ambien) 5 mg HS  PRN PO INSOMNIA Last administered on 2/4/ 17at 23:33; Admin Dose 5 MG;  Start 1/31/17 at 20:00


Digoxin (Digoxin) 0.125 mg DAILY@13 PO  Last administered on 2/5/17at 14:01; 

Admin Dose 0.125 MG;  Start 2/1/17 at 13:00


Pantoprazole (Protonix Iv) 40 mg BID@06,18 IV  Last administered on 2/6/17at 05:

57; Admin Dose 40 MG;  Start 2/1/17 at 18:00


Hydralazine HCl 10 mg 10 mg Q6H  PRN IV ELEVATED BP;  Start 2/1/17 at 16:00


Dextrose/Sodium Chloride (D5-NS) 1,000 ml @  75 mls/hr [id number] IV  Last 

administered on 2/5/17at 23:54; Admin Dose 75 MLS/HR;  Start 2/3/17 at 12:00





Assessment/Plan


Chief Complaint/Hosp Course


Impression:


1. Diverticular Perforation





Plan:


1.  Timing of surgery per Dr Morris


2.  Antibiotics per Dr. Rivera


Problems:  











ESA RATLIFF MD Feb 6, 2017 11:58

## 2017-02-07 VITALS — RESPIRATION RATE: 16 BRPM | DIASTOLIC BLOOD PRESSURE: 72 MMHG | SYSTOLIC BLOOD PRESSURE: 185 MMHG

## 2017-02-07 VITALS — HEART RATE: 79 BPM | DIASTOLIC BLOOD PRESSURE: 59 MMHG | RESPIRATION RATE: 20 BRPM | SYSTOLIC BLOOD PRESSURE: 133 MMHG

## 2017-02-07 VITALS — RESPIRATION RATE: 20 BRPM | SYSTOLIC BLOOD PRESSURE: 171 MMHG | DIASTOLIC BLOOD PRESSURE: 74 MMHG

## 2017-02-07 LAB
ANION GAP SERPL CALC-SCNC: 12 MMOL/L (ref 8–16)
BASOPHILS # BLD AUTO: 0 10^3/UL (ref 0–0.1)
BASOPHILS NFR BLD: 0.7 % (ref 0–2)
BUN SERPL-MCNC: < 2 MG/DL (ref 7–20)
CALCIUM SERPL-MCNC: 9.2 MG/DL (ref 8.4–10.2)
CHLORIDE SERPL-SCNC: 113 MMOL/L (ref 97–110)
CO2 SERPL-SCNC: 26 MMOL/L (ref 21–31)
CONDITION: 1
CREAT SERPL-MCNC: 0.41 MG/DL (ref 0.44–1)
EOSINOPHIL # BLD: 0 10^3/UL (ref 0–0.5)
EOSINOPHIL NFR BLD: 0.8 % (ref 0–7)
ERYTHROCYTE [DISTWIDTH] IN BLOOD BY AUTOMATED COUNT: 16.8 % (ref 11.5–14.5)
GLUCOSE SERPL-MCNC: 90 MG/DL (ref 70–220)
HCT VFR BLD CALC: 30.8 % (ref 37–47)
HGB BLD-MCNC: 10.3 G/DL (ref 12–16)
LH ANALYZER COMMENTS: 1
LYMPHOCYTES # BLD AUTO: 1.8 10^3/UL (ref 0.8–2.9)
LYMPHOCYTES NFR BLD AUTO: 37.3 % (ref 15–51)
MCH RBC QN AUTO: 30.2 PG (ref 29–33)
MCHC RBC AUTO-ENTMCNC: 33.4 G/DL (ref 32–37)
MCV RBC AUTO: 90.7 FL (ref 82–101)
MONOCYTES # BLD: 0.5 10^3/UL (ref 0.3–0.9)
MONOCYTES NFR BLD: 11 % (ref 0–11)
NEUTROPHILS # BLD: 2.4 10^3/UL (ref 1.6–7.5)
NEUTROPHILS NFR BLD AUTO: 50.2 % (ref 39–77)
NRBC # BLD MANUAL: 0 10^3/UL (ref 0–0)
NRBC BLD QL: 0 /100WBC (ref 0–0)
PLATELET # BLD: 181 10^3/UL (ref 140–440)
PMV BLD AUTO: 8.8 FL (ref 7.4–10.4)
POTASSIUM SERPL-SCNC: 3.1 MMOL/L (ref 3.5–5.1)
RBC # BLD AUTO: 3.39 10^6/UL (ref 4.2–5.4)
SODIUM SERPL-SCNC: 148 MMOL/L (ref 135–144)
WBC # BLD AUTO: 4.9 10^3/UL (ref 4.8–10.8)
WBC # BLD: 4.9 10^3/UL (ref 4.8–10.8)

## 2017-02-07 RX ADMIN — LORATADINE SCH MG: 10 TABLET ORAL at 08:26

## 2017-02-07 RX ADMIN — POTASSIUM CHLORIDE SCH MLS/HR: 2 INJECTION, SOLUTION, CONCENTRATE INTRAVENOUS at 02:41

## 2017-02-07 RX ADMIN — MEROPENEM SCH MLS/HR: 1 INJECTION, POWDER, FOR SOLUTION INTRAVENOUS at 21:23

## 2017-02-07 RX ADMIN — DILTIAZEM HYDROCHLORIDE SCH MG: 60 TABLET, FILM COATED ORAL at 20:26

## 2017-02-07 RX ADMIN — MORPHINE SULFATE PRN MG: 2 INJECTION, SOLUTION INTRAMUSCULAR; INTRAVENOUS at 20:22

## 2017-02-07 RX ADMIN — PANTOPRAZOLE SODIUM SCH MG: 40 INJECTION, POWDER, FOR SOLUTION INTRAVENOUS at 06:30

## 2017-02-07 RX ADMIN — DEXAMETHASONE SODIUM PHOSPHATE PRN MG: 10 INJECTION, SOLUTION INTRAMUSCULAR; INTRAVENOUS at 17:31

## 2017-02-07 RX ADMIN — POTASSIUM CHLORIDE SCH MLS/HR: 2 INJECTION, SOLUTION, CONCENTRATE INTRAVENOUS at 21:23

## 2017-02-07 RX ADMIN — DILTIAZEM HYDROCHLORIDE SCH MG: 60 TABLET, FILM COATED ORAL at 17:31

## 2017-02-07 RX ADMIN — POTASSIUM CHLORIDE SCH MLS/HR: 2 INJECTION, SOLUTION, CONCENTRATE INTRAVENOUS at 09:20

## 2017-02-07 RX ADMIN — MONTELUKAST SODIUM SCH MG: 10 TABLET, FILM COATED ORAL at 20:24

## 2017-02-07 RX ADMIN — DIGOXIN SCH MG: 125 TABLET ORAL at 13:12

## 2017-02-07 RX ADMIN — MOMETASONE FUROATE SCH PUFF: 220 INHALANT RESPIRATORY (INHALATION) at 20:25

## 2017-02-07 RX ADMIN — MEROPENEM SCH MLS/HR: 1 INJECTION, POWDER, FOR SOLUTION INTRAVENOUS at 08:25

## 2017-02-07 RX ADMIN — MAGNESIUM OXIDE TAB 400 MG (241.3 MG ELEMENTAL MG) SCH MG: 400 (241.3 MG) TAB at 08:26

## 2017-02-07 RX ADMIN — MOMETASONE FUROATE SCH PUFF: 220 INHALANT RESPIRATORY (INHALATION) at 08:27

## 2017-02-07 RX ADMIN — DILTIAZEM HYDROCHLORIDE SCH MG: 60 TABLET, FILM COATED ORAL at 08:26

## 2017-02-07 RX ADMIN — MORPHINE SULFATE PRN MG: 2 INJECTION, SOLUTION INTRAMUSCULAR; INTRAVENOUS at 09:24

## 2017-02-07 RX ADMIN — DILTIAZEM HYDROCHLORIDE SCH MG: 60 TABLET, FILM COATED ORAL at 13:12

## 2017-02-07 RX ADMIN — PANTOPRAZOLE SODIUM SCH MG: 40 INJECTION, POWDER, FOR SOLUTION INTRAVENOUS at 17:30

## 2017-02-07 NOTE — SP
DATE OF PROCEDURE:  

 

 

MEDICAL PROGRESS NOTE 

 

SUBJECTIVE:  The patient is feeling better except for some slight nausea this morning.  The patient 
with less back pain and no abdominal pain currently.

 

OBJECTIVE:  

VITAL SIGNS:  Temperature 98.1, pulse 79 and regular, respirations 20, blood pressure 133/59, oxygen
 saturation 95% on room air.

GENERAL:  Well-developed, well-nourished female in no acute distress, lying in bed.  

LUNGS:  Increased expiratory phase, otherwise clear.

HEART:  Regular rate and rhythm without ectopy.  No murmurs, gallops, or rubs.

ABDOMEN:  Soft, nontender, normoactive bowel sounds.  No rebound, no masses.

EXTREMITIES:  No cyanosis, clubbing, or edema.  Mild to moderate tenderness in the right LS junction
 area and along the lumbar spine.  

 

LABORATORY EXAMINATION:  Hemoglobin of 10.3, hematocrit of 30.8, platelets of 181, white blood cell 
count 4.9, potassium 3.1, chloride 113, sodium 148, bicarbonate 26, BUN of less than 2, creatinine 0
.42, glucose of 90.

 

ASSESSMENT AND PLAN

1.  Perforated sigmoid colon from diverticulitis.  The patient remained stable and will continue the
 patient on the meropenem through March 2nd per Dr. Rivera' recommendations.  The patient will need fo
llow up with Dr. Morris as well as CT scan in the coming weeks.  Will continue to advance diet.

2.  Asthma.  This is stable.  Will continue this patient's medications and p.r.n. nebulizer therapy.

3.  Hypertension, paroxysmal atrial fibrillation.  This is stable.  Will continues this patient's me
dications, pacer.  Will resume her anticoagulation, start with 5 mg of Coumadin today.

4.  Anemia.  This remains somewhat stable with good decrease compared to the previous.  Will recheck
 the CBC tomorrow in light of the patient being restarted on Coumadin.

5.  Hypokalemia, hypopotassemia, low potassium.  Will replace with potassium chloride IV 40 mEq and 
recheck in the morning with a magnesium.  

6.  Vertebral compression fracture L1, L4 as well as low back pain.  This remained stable, but the p
atient is debilitated due to this and would benefit from acute rehabilitation.  If the patient does 
not qualify for this, we will transfer the patient the SNF and will continue with the patient's p.r.
n. analgesics.

 

 

Dictated By: AMPAROMILLER MAST MD, SR/NTS

DD:    02/07/2017 13:33:00

DT:    02/07/2017 14:35:52

Conf#: [id number]

DID#:  [id number]

## 2017-02-07 NOTE — CONS
Date/Time of Note


Date/Time of Note


DATE: 2/7/17 


TIME: 14:40





Consult Date/Type/Reason


Admit Date/Time


Jan 31, 2017 at 15:04


Initial Consult Date


2/1/17


Type of Consultation:  GI


Ordering Provider:  AMPARO MAST MD-





Subjective


Tolerating diet


Passing bm





Objective





 Vital Signs








  Date Time  Temp Pulse Resp B/P Pulse Ox O2 Delivery O2 Flow Rate FiO2


 


2/7/17 09:10  79 20 133/59 95 Room Air  


 


2/7/17 08:00 98.1       





Abdomen: soft, minimal L suprapubic tenderness








 Intake and Output   


 


 2/6/17 2/6/17 2/7/17





 15:00 23:00 07:00


 


Intake Total 1100 ml 2155 ml 1010 ml


 


Output Total  1750 ml 2200 ml


 


Balance 1100 ml 405 ml -1190 ml











Results/Medications


Result Diagram:  


2/7/17 0505                                                                    

            2/7/17 0505





Results 24 hrs





Laboratory Tests








Test


  2/7/17


05:05


 


Anion Gap 12  


 


Basophils # 0.0  


 


Basophils % 0.7  


 


Blood Morphology Comment   


 


Blood Urea Nitrogen < 2  L


 


Calcium Level 9.2  


 


Carbon Dioxide Level 26  


 


Chloride Level 113  H


 


Creatinine 0.41  L


 


Eosinophils # 0.0  


 


Eosinophils % 0.8  


 


Glucose Level 90  


 


Hematocrit 30.8  L


 


Hemoglobin 10.3  L


 


Lymphocytes # 1.8  


 


Lymphocytes % 37.3  


 


Mean Corpuscular Hemoglobin 30.2  


 


Mean Corpuscular Hemoglobin


Concent 33.4  


 


 


Mean Corpuscular Volume 90.7  


 


Mean Platelet Volume 8.8  


 


Monocytes # 0.5  


 


Monocytes % 11.0  


 


Neutrophils # 2.4  


 


Neutrophils % 50.2  


 


Nucleated Red Blood Cells # 0.0  


 


Nucleated Red Blood Cells % 0.0  


 


Platelet Count 181  


 


Potassium Level 3.1  L


 


Red Blood Count 3.39  L


 


Red Cell Distribution Width 16.8  H


 


Sodium Level 148  H


 


White Blood Count 4.9  








Medications





 Current Medications


Meropenem (Merrem 1 Gm/100 ml (Pmx)) 100 ml @  200 mls/hr Q12 IVPB  Last 

administered on 2/7/17at 08:25; Admin Dose 200 MLS/HR;  Start 1/31/17 at 21:00


Morphine Sulfate (morphine) 2 mg Q3H  PRN IV PAIN LEVEL 6-10 Last administered 

on 2/7/17at 09:24; Admin Dose 2 MG;  Start 1/31/17 at 16:00


Mometasone Furoate (Asmanex) 1 puff BID INH  Last administered on 2/7/17at 08:27

; Admin Dose 1 PUFF;  Start 1/31/17 at 21:00


Montelukast Sodium (Singulair) 10 mg HS PO  Last administered on 2/6/17at 21:46

; Admin Dose 10 MG;  Start 1/31/17 at 21:00


Cyclosporine (Restasis) 1 drop BID BOTH EYES  Last administered on 2/6/17at 10:

18; Admin Dose 1 DROP;  Start 1/31/17 at 21:00


Diltiazem HCl (Cardizem) 60 mg QID PO  Last administered on 2/7/17at 13:12; 

Admin Dose 60 MG;  Start 1/31/17 at 17:00


Fluticasone Propionate (Flonase 0.05% Nasal) 1 spray BID NASAL  Last 

administered on 2/7/17at 08:27; Admin Dose 1 SPRAY;  Start 1/31/17 at 21:00


Acetaminophen (Tylenol Liquid) 650 mg Q4H  PRN GTB PAIN AND OR ELEVATED TEMP;  

Start 1/31/17 at 20:00


Magnesium Oxide (Mag-Ox 400) 400 mg DAILY PO  Last administered on 2/7/17at 08:

26; Admin Dose 400 MG;  Start 2/1/17 at 09:00


Ondansetron HCl (Zofran Inj) 4 mg Q4H  PRN IV NAUSEA AND/OR VOMITING Last 

administered on 2/6/17at 13:01; Admin Dose 4 MG;  Start 1/31/17 at 20:00


Loratadine (Claritin) 10 mg DAILY PO  Last administered on 2/7/17at 08:26; 

Admin Dose 10 MG;  Start 2/1/17 at 09:00


Zolpidem Tartrate (Ambien) 5 mg HS  PRN PO INSOMNIA Last administered on 2/4/ 17at 23:33; Admin Dose 5 MG;  Start 1/31/17 at 20:00


Digoxin (Digoxin) 0.125 mg DAILY@13 PO  Last administered on 2/7/17at 13:12; 

Admin Dose 0.125 MG;  Start 2/1/17 at 13:00


Pantoprazole (Protonix Iv) 40 mg BID@06,18 IV  Last administered on 2/7/17at 06:

30; Admin Dose 40 MG;  Start 2/1/17 at 18:00


Hydralazine HCl 10 mg 10 mg Q6H  PRN IV ELEVATED BP Last administered on 2/7/ 17at 08:33; Admin Dose 10 MG;  Start 2/1/17 at 16:00


Dextrose/Sodium Chloride (D5-NS) 1,000 ml @  75 mls/hr [id number] IV  Last 

administered on 2/7/17at 02:41; Admin Dose 75 MLS/HR;  Start 2/3/17 at 12:00


Warfarin Sodium (Coumadin) 5 mg DAILY@17 PO ;  Start 2/7/17 at 17:00





Assessment/Plan


Chief Complaint/Hosp Course


Impression:


1. Diverticular Perforation





Plan:


1.  Timing of surgery per Dr Morris


2.  Antibiotics per Dr. Rivera


3.  Will see intermittently at this point. Please call if questions or problems 

arise in interim


Problems:  











ESA RATLIFF MD Feb 7, 2017 14:41

## 2017-02-07 NOTE — PN
Date/Time of Note


Date/Time of Note


DATE: 2/7/17 


TIME: 19:10





Assessment/Plan


Lines/Catheters


IV Catheter Type (from Nrs):  PICC Line


Coffey in Place (from Nrs):  No





Assessment/Plan


Chief Complaint/Hosp Course


1. Microperforated diverticulitis, ? continued leak vs already sealed (old 

residual).  Patient refused water enema CT scan.  Symptomatically improved.  

Prefers to avoid colostomy if possible.  Improving.  


-iv abx


-advance diet as tolerated


-if pain worsens or clinically deteriorates will proceed with surgery


2. Anemia without evidence of acute blood loss


-Monitor


3. Paroxysmal atrial fibrillation currently in sinus.


-Optimize electrolytes


4. GERD


-Diet and lifestyle optimization


-PPI


5. Asthma


-Medical optimization


6. Hypertension


-Nutrition and medication optimization


7. Back pain with Vertebral compression fracture


-Physical therapy


-Pain control


8. History of sick sinus syndrome status post pacemaker


-Cardiac optimization





Thank you,


Problems:  





Subjective


24 Hr Interval Summary


Min nausea without vomiting.  Pain LLQ improved.  No fevers or chills. No 

nausea vomiting. No chest pain or shortness of breath. No visual or neurologic 

changes. No dysuria. Min back pain. Flatus and bm.





Exam/Review of Systems


Vital Signs


Vitals





 Vital Signs








  Date Time  Temp Pulse Resp B/P Pulse Ox O2 Delivery O2 Flow Rate FiO2


 


2/7/17 09:10  79 20 133/59 95 Room Air  


 


2/7/17 08:00 98.1       














 Intake and Output   


 


 2/6/17 2/6/17 2/7/17





 15:00 23:00 07:00


 


Intake Total 1100 ml 2155 ml 1010 ml


 


Output Total  1750 ml 2200 ml


 


Balance 1100 ml 405 ml -1190 ml











Exam


Free Text/Dictation


Constitutional:  alert, oriented, No distress


Psych:  anxiety, No confusion


Head:  atraumatic, normocephalic


Eyes:  EOMI, PERRL, nl conjunctiva, No icteric


ENMT:  mucosa pink and moist, nl external ears & nose, nl lips & teeth


Neck:  non-tender, No jvd


Respiratory:  normal air movement, No congested cough, No labored breathing


Cardiovascular:  regular rate and rhythm, No edema


Gastrointestinal:  soft, minimal tenderness LLQ without rebound or guarding, 

not rigid


Musculoskeletal:  nl extremities to inspection, nl gait and stance, No joint 

tenderness


Extremities:  normal pulses, No calf tenderness, No cyanosis, No edema


Neurological:  nl mental status, nl speech, nl strength


Skin:  nl turgor, No diaphoresis, No rash or lesions


Lymph:  nl lymph nodes





Results


Result Diagram:  


2/7/17 0505                                                                    

            2/7/17 0505














CHRISTOPHE ESCALONA MD Feb 7, 2017 19:10

## 2017-02-08 VITALS — RESPIRATION RATE: 16 BRPM | SYSTOLIC BLOOD PRESSURE: 171 MMHG | DIASTOLIC BLOOD PRESSURE: 76 MMHG

## 2017-02-08 VITALS — RESPIRATION RATE: 18 BRPM | DIASTOLIC BLOOD PRESSURE: 53 MMHG | SYSTOLIC BLOOD PRESSURE: 105 MMHG

## 2017-02-08 LAB
ANION GAP SERPL CALC-SCNC: 13 MMOL/L (ref 8–16)
BASOPHILS # BLD AUTO: 0 10^3/UL (ref 0–0.1)
BASOPHILS NFR BLD: 0.6 % (ref 0–2)
BUN SERPL-MCNC: < 2 MG/DL (ref 7–20)
CALCIUM SERPL-MCNC: 8.7 MG/DL (ref 8.4–10.2)
CHLORIDE SERPL-SCNC: 111 MMOL/L (ref 97–110)
CO2 SERPL-SCNC: 24 MMOL/L (ref 21–31)
CONDITION: 1
CREAT SERPL-MCNC: 0.35 MG/DL (ref 0.44–1)
EOSINOPHIL # BLD: 0.1 10^3/UL (ref 0–0.5)
EOSINOPHIL NFR BLD: 1.2 % (ref 0–7)
ERYTHROCYTE [DISTWIDTH] IN BLOOD BY AUTOMATED COUNT: 17.4 % (ref 11.5–14.5)
GLUCOSE SERPL-MCNC: 301 MG/DL (ref 70–220)
HCT VFR BLD CALC: 31.3 % (ref 37–47)
HGB BLD-MCNC: 10.4 G/DL (ref 12–16)
INR PPP: 1.16
LH ANALYZER COMMENTS: 1
LYMPHOCYTES # BLD AUTO: 1.9 10^3/UL (ref 0.8–2.9)
LYMPHOCYTES NFR BLD AUTO: 39.9 % (ref 15–51)
MAGNESIUM SERPL-MCNC: 1.7 MG/DL (ref 1.7–2.5)
MCH RBC QN AUTO: 30.2 PG (ref 29–33)
MCHC RBC AUTO-ENTMCNC: 33.4 G/DL (ref 32–37)
MCV RBC AUTO: 90.4 FL (ref 82–101)
MONOCYTES # BLD: 0.5 10^3/UL (ref 0.3–0.9)
MONOCYTES NFR BLD: 10.5 % (ref 0–11)
NEUTROPHILS # BLD: 2.3 10^3/UL (ref 1.6–7.5)
NEUTROPHILS NFR BLD AUTO: 47.8 % (ref 39–77)
NRBC # BLD MANUAL: 0 10^3/UL (ref 0–0)
NRBC BLD QL: 0 /100WBC (ref 0–0)
PLATELET # BLD: 166 10^3/UL (ref 140–440)
PMV BLD AUTO: 9.1 FL (ref 7.4–10.4)
POTASSIUM SERPL-SCNC: 3.3 MMOL/L (ref 3.5–5.1)
PROTHROMBIN TIME: 14.8 SEC (ref 12.2–14.2)
PT RATIO: 1.2
RBC # BLD AUTO: 3.46 10^6/UL (ref 4.2–5.4)
SODIUM SERPL-SCNC: 145 MMOL/L (ref 135–144)
WBC # BLD AUTO: 4.9 10^3/UL (ref 4.8–10.8)
WBC # BLD: 4.9 10^3/UL (ref 4.8–10.8)

## 2017-02-08 RX ADMIN — PANTOPRAZOLE SODIUM SCH MG: 40 INJECTION, POWDER, FOR SOLUTION INTRAVENOUS at 05:35

## 2017-02-08 RX ADMIN — DILTIAZEM HYDROCHLORIDE SCH MG: 60 TABLET, FILM COATED ORAL at 20:47

## 2017-02-08 RX ADMIN — LORATADINE SCH MG: 10 TABLET ORAL at 09:20

## 2017-02-08 RX ADMIN — MORPHINE SULFATE PRN MG: 2 INJECTION, SOLUTION INTRAMUSCULAR; INTRAVENOUS at 21:12

## 2017-02-08 RX ADMIN — MEROPENEM SCH MLS/HR: 1 INJECTION, POWDER, FOR SOLUTION INTRAVENOUS at 09:21

## 2017-02-08 RX ADMIN — MORPHINE SULFATE PRN MG: 2 INJECTION, SOLUTION INTRAMUSCULAR; INTRAVENOUS at 05:35

## 2017-02-08 RX ADMIN — PANTOPRAZOLE SODIUM SCH MG: 40 INJECTION, POWDER, FOR SOLUTION INTRAVENOUS at 17:54

## 2017-02-08 RX ADMIN — POTASSIUM CHLORIDE SCH MLS/HR: 2 INJECTION, SOLUTION, CONCENTRATE INTRAVENOUS at 12:00

## 2017-02-08 RX ADMIN — MOMETASONE FUROATE SCH PUFF: 220 INHALANT RESPIRATORY (INHALATION) at 20:45

## 2017-02-08 RX ADMIN — MOMETASONE FUROATE SCH PUFF: 220 INHALANT RESPIRATORY (INHALATION) at 09:22

## 2017-02-08 RX ADMIN — DILTIAZEM HYDROCHLORIDE SCH MG: 60 TABLET, FILM COATED ORAL at 14:12

## 2017-02-08 RX ADMIN — POTASSIUM CHLORIDE SCH MLS/HR: 2 INJECTION, SOLUTION, CONCENTRATE INTRAVENOUS at 15:18

## 2017-02-08 RX ADMIN — MONTELUKAST SODIUM SCH MG: 10 TABLET, FILM COATED ORAL at 20:46

## 2017-02-08 RX ADMIN — DEXAMETHASONE SODIUM PHOSPHATE PRN MG: 10 INJECTION, SOLUTION INTRAMUSCULAR; INTRAVENOUS at 09:53

## 2017-02-08 RX ADMIN — MAGNESIUM OXIDE TAB 400 MG (241.3 MG ELEMENTAL MG) SCH MG: 400 (241.3 MG) TAB at 09:20

## 2017-02-08 RX ADMIN — DILTIAZEM HYDROCHLORIDE SCH MG: 60 TABLET, FILM COATED ORAL at 17:54

## 2017-02-08 RX ADMIN — MEROPENEM SCH MLS/HR: 1 INJECTION, POWDER, FOR SOLUTION INTRAVENOUS at 21:35

## 2017-02-08 RX ADMIN — DIGOXIN SCH MG: 125 TABLET ORAL at 14:12

## 2017-02-08 RX ADMIN — DILTIAZEM HYDROCHLORIDE SCH MG: 60 TABLET, FILM COATED ORAL at 09:21

## 2017-02-08 RX ADMIN — ZOLPIDEM TARTRATE PRN MG: 5 TABLET, FILM COATED ORAL at 21:33

## 2017-02-08 RX ADMIN — DEXAMETHASONE SODIUM PHOSPHATE PRN MG: 10 INJECTION, SOLUTION INTRAMUSCULAR; INTRAVENOUS at 17:54

## 2017-02-08 NOTE — PN
DATE:  02/08/2017

 

 

SUBJECTIVE:  The patient is complaining of mild nausea, dry mouth, otherwise doing well.  

 

OBJECTIVE: 

VITAL SIGNS:  Temperature 98.6, blood pressure 171/76, pulse 62, respirations 16, oxygen saturation 
94% on room air.

GENERAL:  Well-developed, well-nourished female in no acute distress.  

LUNGS:  Lungs without rhonchi or crackles.

HEART:  Regular rate and rhythm.

ABDOMEN:  Soft, nontender, nondistended, normoactive bowel sounds.  No rebound.

EXTREMITIES:  No cyanosis, clubbing or edema.

 

LABORATORY EXAMINATION:   Shows a white blood cell count of 4.9, hemoglobin 10.4, hematocrit 31.3, p
latelets 166.  Sodium 145, potassium 3.3, chloride 111, blood sugar 301, creatinine 0.35, magnesium 
1.7.  INR 1.16.

 

ASSESSMENT AND PLAN:

1.  Perforated diverticulitis of the sigmoid colon.  Patient continues to remain stable on antibioti
cs and conservative management.  Patient will need to remain on antibiotics for at least the next 3+
 weeks until 03/02/2017, per Dr. Rivera but we will do a CAT scan prior to discontinuing the antibioti
cs.

2.  Asthma. This is stable.  Continue patient's routine medications.

3.  Hypomagnesemia.  We will replace with 2 grams of magnesium sulfate.

4.  Hypokalemia.  We will give potassium chloride IV today.

5.  Paroxysmal atrial fibrillation, hypertension.  We will continue her routine medications and anti
coagulation.

6.  Discharge planning.  Patient will go to SNF as she was not felt to be a candidate for acute reha
bilitation. This will be done either today or tomorrow depending on bed availability.

 

 

Dictated By: AMPARO MAST MD, SR/DEENA

DD:    02/08/2017 09:08:31

DT:    02/08/2017 11:33:18

Conf#: [id number]

DID#:  [id number]

## 2017-02-09 VITALS — RESPIRATION RATE: 16 BRPM | DIASTOLIC BLOOD PRESSURE: 99 MMHG | SYSTOLIC BLOOD PRESSURE: 175 MMHG

## 2017-02-09 LAB
ANION GAP SERPL CALC-SCNC: 12 MMOL/L (ref 8–16)
BUN SERPL-MCNC: < 2 MG/DL (ref 7–20)
CALCIUM SERPL-MCNC: 8.7 MG/DL (ref 8.4–10.2)
CHLORIDE SERPL-SCNC: 111 MMOL/L (ref 97–110)
CO2 SERPL-SCNC: 27 MMOL/L (ref 21–31)
CREAT SERPL-MCNC: 0.42 MG/DL (ref 0.44–1)
GLUCOSE SERPL-MCNC: 271 MG/DL (ref 70–220)
POTASSIUM SERPL-SCNC: 3.7 MMOL/L (ref 3.5–5.1)
SODIUM SERPL-SCNC: 146 MMOL/L (ref 135–144)

## 2017-02-09 RX ADMIN — DEXAMETHASONE SODIUM PHOSPHATE PRN MG: 10 INJECTION, SOLUTION INTRAMUSCULAR; INTRAVENOUS at 12:54

## 2017-02-09 RX ADMIN — POTASSIUM CHLORIDE SCH MLS/HR: 2 INJECTION, SOLUTION, CONCENTRATE INTRAVENOUS at 05:37

## 2017-02-09 RX ADMIN — MOMETASONE FUROATE SCH PUFF: 220 INHALANT RESPIRATORY (INHALATION) at 09:19

## 2017-02-09 RX ADMIN — POTASSIUM CHLORIDE SCH MLS/HR: 2 INJECTION, SOLUTION, CONCENTRATE INTRAVENOUS at 14:40

## 2017-02-09 RX ADMIN — MORPHINE SULFATE PRN MG: 2 INJECTION, SOLUTION INTRAMUSCULAR; INTRAVENOUS at 16:41

## 2017-02-09 RX ADMIN — DIGOXIN SCH MG: 125 TABLET ORAL at 12:53

## 2017-02-09 RX ADMIN — MEROPENEM SCH MLS/HR: 1 INJECTION, POWDER, FOR SOLUTION INTRAVENOUS at 09:19

## 2017-02-09 RX ADMIN — MORPHINE SULFATE PRN MG: 2 INJECTION, SOLUTION INTRAMUSCULAR; INTRAVENOUS at 04:30

## 2017-02-09 RX ADMIN — DILTIAZEM HYDROCHLORIDE SCH MG: 60 TABLET, FILM COATED ORAL at 09:20

## 2017-02-09 RX ADMIN — PANTOPRAZOLE SODIUM SCH MG: 40 INJECTION, POWDER, FOR SOLUTION INTRAVENOUS at 06:16

## 2017-02-09 RX ADMIN — MAGNESIUM OXIDE TAB 400 MG (241.3 MG ELEMENTAL MG) SCH MG: 400 (241.3 MG) TAB at 09:00

## 2017-02-09 RX ADMIN — DILTIAZEM HYDROCHLORIDE SCH MG: 60 TABLET, FILM COATED ORAL at 12:54

## 2017-02-09 RX ADMIN — LORATADINE SCH MG: 10 TABLET ORAL at 09:20

## 2017-02-09 NOTE — SP
MEDICAL PROGNESS NOTE:



DATE OF VISIT:  02/09/2017

 

 

SUBJECTIVE:  The patient is feeling better, still having intermittent nausea 
and intermittent back pain.

 

OBJECTIVE:  

VITAL SIGNS:  Temperature  97.7, blood pressure 175/99, pulse 78, respirations 
16, oxygen saturation 95% on room air.

GENERAL:  Well-developed, well-nourished female in no acute distress.

LUNGS:  Clear to auscultation bilaterally with increased expiratory rate, heart 
regular rate and rhythm without ectopy.

ABDOMEN:  Soft, mild left lower quadrant tenderness to deep palpation.  
Normoactive bowel sounds.  No masses noted.  No rebound.

EXTREMITIES:  No cyanosis, clubbing or edema.

 

LABORATORY EXAMINATION:  Sodium 146, potassium 3.7, chloride 111, creatinine 
0.42, BUN less than 2.  Blood sugar 271, calcium 8.7 probably drawn from the 
PICC line.

 

ASSESSMENT AND PLAN:  

1.  Perforated sigmoid colon from diverticulitis.  The patient continues to 
remain stable and improving clinically.  We will continue the IV meropenem 
until at least March 2.  We will do a CAT scan as an outpatient prior to 
discontinuing this.  Patient also will need a followup with Dr. Morris within 
2 weeks.

2.  Asthma.  Continue patient's routine medications.

3.  Hypertension  and paroxysmal atrial fibrillation.  Continue patient's 
medications.

4.  Anemia.  This remained stable and no need for transfusions and the patient 
has had no evidence of any rebleed.

5.   Hypokalemia.  This was replaced.

6.  Hypomagnesemia.  This was replaced.

4.  Discharge planning.  Patient is stable for discharge to [facility] for 
further rehabilitation and IV antibiotics.

 

 

Dictated By: AMPARO MAST MD, SR/DEENA

DD:    02/09/2017 12:44:52

DT:    02/09/2017 14:04:59

Conf#: [id number]

DID#:  [id number]

 

[id number]

## 2017-02-20 ENCOUNTER — HOSPITAL ENCOUNTER (OUTPATIENT)
Dept: HOSPITAL 10 - C/S | Age: 82
Discharge: HOME | End: 2017-02-20
Attending: INTERNAL MEDICINE
Payer: MEDICARE

## 2017-02-20 DIAGNOSIS — M48.56XG: ICD-10-CM

## 2017-02-20 DIAGNOSIS — N32.89: ICD-10-CM

## 2017-02-20 DIAGNOSIS — K57.90: Primary | ICD-10-CM

## 2017-02-20 PROCEDURE — 74176 CT ABD & PELVIS W/O CONTRAST: CPT

## 2017-04-26 ENCOUNTER — AMBULATORY SURGICAL CENTER (OUTPATIENT)
Dept: URBAN - METROPOLITAN AREA SURGERY 34 | Facility: SURGERY | Age: 81
End: 2017-04-26

## 2017-04-26 VITALS
SYSTOLIC BLOOD PRESSURE: 164 MMHG | HEART RATE: 64 BPM | TEMPERATURE: 98.8 F | WEIGHT: 118 LBS | OXYGEN SATURATION: 98 % | RESPIRATION RATE: 16 BRPM | DIASTOLIC BLOOD PRESSURE: 76 MMHG | HEIGHT: 64 IN

## 2017-04-26 DIAGNOSIS — D12.2 BENIGN NEOPLASM OF ASCENDING COLON: ICD-10-CM

## 2017-04-26 DIAGNOSIS — D12.5 BENIGN NEOPLASM OF SIGMOID COLON: ICD-10-CM

## 2017-04-26 DIAGNOSIS — K57.30 DVRTCLOS OF LG INT W/O PERFORATION OR ABSCESS W/O BLEEDING: ICD-10-CM

## 2017-04-26 PROCEDURE — 45380 COLONOSCOPY AND BIOPSY: CPT | Mod: 59 | Performed by: INTERNAL MEDICINE

## 2017-04-26 PROCEDURE — 45385 COLONOSCOPY W/LESION REMOVAL: CPT | Performed by: INTERNAL MEDICINE

## 2017-04-26 NOTE — SERVICEHPINOTES
The patient returns at the suggestion of Dr. Ann to consider a follow-up colonoscopy. At her last examination 2012 she had a small ascending colon tubular adenoma. Tortuosity and a redundant colon as well as diverticulosis was noted. Because of problems with constipation she takes MiraLAX daily. With this her bowel movements are soft and she notes some flatulence. She denies any rectal bleeding, melena and diarrhea, constipation or change in weight. There is no family history of colonic neoplasia. She does have underlying medical problems of asthma, atrial fibrillation, gastroesophageal reflux disease and a pacemaker being placed. She recently had substantial problems with vertigo.

## 2017-04-28 ENCOUNTER — HOSPITAL ENCOUNTER (INPATIENT)
Dept: HOSPITAL 10 - REC | Age: 82
LOS: 8 days | Discharge: HOME | DRG: 330 | End: 2017-05-06
Payer: MEDICARE

## 2017-04-28 VITALS — DIASTOLIC BLOOD PRESSURE: 60 MMHG | RESPIRATION RATE: 19 BRPM | HEART RATE: 58 BPM | SYSTOLIC BLOOD PRESSURE: 142 MMHG

## 2017-04-28 VITALS — RESPIRATION RATE: 20 BRPM | SYSTOLIC BLOOD PRESSURE: 146 MMHG | HEART RATE: 58 BPM | DIASTOLIC BLOOD PRESSURE: 53 MMHG

## 2017-04-28 VITALS — HEART RATE: 69 BPM | DIASTOLIC BLOOD PRESSURE: 62 MMHG | RESPIRATION RATE: 16 BRPM | SYSTOLIC BLOOD PRESSURE: 135 MMHG

## 2017-04-28 VITALS — DIASTOLIC BLOOD PRESSURE: 59 MMHG | RESPIRATION RATE: 20 BRPM | HEART RATE: 71 BPM | SYSTOLIC BLOOD PRESSURE: 121 MMHG

## 2017-04-28 VITALS
WEIGHT: 108.03 LBS | WEIGHT: 108.03 LBS | HEIGHT: 64 IN | BODY MASS INDEX: 18.44 KG/M2 | HEIGHT: 64 IN | BODY MASS INDEX: 18.44 KG/M2 | BODY MASS INDEX: 18.44 KG/M2

## 2017-04-28 VITALS — SYSTOLIC BLOOD PRESSURE: 146 MMHG | RESPIRATION RATE: 20 BRPM | DIASTOLIC BLOOD PRESSURE: 57 MMHG | HEART RATE: 58 BPM

## 2017-04-28 VITALS — DIASTOLIC BLOOD PRESSURE: 67 MMHG | RESPIRATION RATE: 20 BRPM | HEART RATE: 77 BPM | SYSTOLIC BLOOD PRESSURE: 133 MMHG

## 2017-04-28 VITALS — SYSTOLIC BLOOD PRESSURE: 142 MMHG | HEART RATE: 74 BPM | RESPIRATION RATE: 18 BRPM | DIASTOLIC BLOOD PRESSURE: 60 MMHG

## 2017-04-28 VITALS — DIASTOLIC BLOOD PRESSURE: 61 MMHG | SYSTOLIC BLOOD PRESSURE: 146 MMHG | RESPIRATION RATE: 18 BRPM | HEART RATE: 58 BPM

## 2017-04-28 VITALS — RESPIRATION RATE: 19 BRPM | HEART RATE: 60 BPM | SYSTOLIC BLOOD PRESSURE: 136 MMHG | DIASTOLIC BLOOD PRESSURE: 58 MMHG

## 2017-04-28 VITALS — SYSTOLIC BLOOD PRESSURE: 132 MMHG | HEART RATE: 58 BPM | RESPIRATION RATE: 13 BRPM | DIASTOLIC BLOOD PRESSURE: 48 MMHG

## 2017-04-28 VITALS — RESPIRATION RATE: 29 BRPM | DIASTOLIC BLOOD PRESSURE: 47 MMHG | SYSTOLIC BLOOD PRESSURE: 134 MMHG | HEART RATE: 58 BPM

## 2017-04-28 VITALS — DIASTOLIC BLOOD PRESSURE: 59 MMHG | SYSTOLIC BLOOD PRESSURE: 141 MMHG | RESPIRATION RATE: 21 BRPM | HEART RATE: 58 BPM

## 2017-04-28 VITALS — DIASTOLIC BLOOD PRESSURE: 62 MMHG | HEART RATE: 60 BPM | RESPIRATION RATE: 10 BRPM | SYSTOLIC BLOOD PRESSURE: 150 MMHG

## 2017-04-28 VITALS — SYSTOLIC BLOOD PRESSURE: 125 MMHG | HEART RATE: 60 BPM | DIASTOLIC BLOOD PRESSURE: 57 MMHG | RESPIRATION RATE: 16 BRPM

## 2017-04-28 VITALS — HEART RATE: 58 BPM | DIASTOLIC BLOOD PRESSURE: 51 MMHG | SYSTOLIC BLOOD PRESSURE: 131 MMHG | RESPIRATION RATE: 19 BRPM

## 2017-04-28 VITALS — RESPIRATION RATE: 18 BRPM | HEART RATE: 61 BPM | DIASTOLIC BLOOD PRESSURE: 69 MMHG | SYSTOLIC BLOOD PRESSURE: 159 MMHG

## 2017-04-28 VITALS — SYSTOLIC BLOOD PRESSURE: 143 MMHG | DIASTOLIC BLOOD PRESSURE: 67 MMHG | RESPIRATION RATE: 18 BRPM | HEART RATE: 91 BPM

## 2017-04-28 VITALS — DIASTOLIC BLOOD PRESSURE: 62 MMHG | RESPIRATION RATE: 20 BRPM | SYSTOLIC BLOOD PRESSURE: 132 MMHG | HEART RATE: 75 BPM

## 2017-04-28 VITALS — RESPIRATION RATE: 16 BRPM | DIASTOLIC BLOOD PRESSURE: 54 MMHG | SYSTOLIC BLOOD PRESSURE: 131 MMHG | HEART RATE: 66 BPM

## 2017-04-28 VITALS — DIASTOLIC BLOOD PRESSURE: 58 MMHG | SYSTOLIC BLOOD PRESSURE: 135 MMHG | HEART RATE: 58 BPM | RESPIRATION RATE: 21 BRPM

## 2017-04-28 VITALS — DIASTOLIC BLOOD PRESSURE: 57 MMHG | HEART RATE: 58 BPM | RESPIRATION RATE: 17 BRPM | SYSTOLIC BLOOD PRESSURE: 136 MMHG

## 2017-04-28 VITALS — DIASTOLIC BLOOD PRESSURE: 71 MMHG | SYSTOLIC BLOOD PRESSURE: 153 MMHG | RESPIRATION RATE: 22 BRPM | HEART RATE: 83 BPM

## 2017-04-28 VITALS — SYSTOLIC BLOOD PRESSURE: 122 MMHG | RESPIRATION RATE: 24 BRPM | DIASTOLIC BLOOD PRESSURE: 54 MMHG | HEART RATE: 60 BPM

## 2017-04-28 DIAGNOSIS — I48.0: ICD-10-CM

## 2017-04-28 DIAGNOSIS — D64.9: ICD-10-CM

## 2017-04-28 DIAGNOSIS — M81.0: ICD-10-CM

## 2017-04-28 DIAGNOSIS — K56.0: ICD-10-CM

## 2017-04-28 DIAGNOSIS — M35.00: ICD-10-CM

## 2017-04-28 DIAGNOSIS — J45.30: ICD-10-CM

## 2017-04-28 DIAGNOSIS — K44.9: ICD-10-CM

## 2017-04-28 DIAGNOSIS — M48.50XA: ICD-10-CM

## 2017-04-28 DIAGNOSIS — Z95.0: ICD-10-CM

## 2017-04-28 DIAGNOSIS — K21.9: ICD-10-CM

## 2017-04-28 DIAGNOSIS — I10: ICD-10-CM

## 2017-04-28 DIAGNOSIS — K57.20: Primary | ICD-10-CM

## 2017-04-28 LAB
APTT BLD: 27.3 SEC (ref 25–35)
INR PPP: 0.99
PROTHROMBIN TIME: 13.1 SEC (ref 12.2–14.2)
PT RATIO: 1

## 2017-04-28 PROCEDURE — 88305 TISSUE EXAM BY PATHOLOGIST: CPT

## 2017-04-28 PROCEDURE — 97116 GAIT TRAINING THERAPY: CPT

## 2017-04-28 PROCEDURE — 97530 THERAPEUTIC ACTIVITIES: CPT

## 2017-04-28 PROCEDURE — 0DBN4ZZ EXCISION OF SIGMOID COLON, PERCUTANEOUS ENDOSCOPIC APPROACH: ICD-10-PCS

## 2017-04-28 PROCEDURE — 83735 ASSAY OF MAGNESIUM: CPT

## 2017-04-28 PROCEDURE — 97163 PT EVAL HIGH COMPLEX 45 MIN: CPT

## 2017-04-28 PROCEDURE — 81003 URINALYSIS AUTO W/O SCOPE: CPT

## 2017-04-28 PROCEDURE — 87086 URINE CULTURE/COLONY COUNT: CPT

## 2017-04-28 PROCEDURE — 86850 RBC ANTIBODY SCREEN: CPT

## 2017-04-28 PROCEDURE — 81001 URINALYSIS AUTO W/SCOPE: CPT

## 2017-04-28 PROCEDURE — 80048 BASIC METABOLIC PNL TOTAL CA: CPT

## 2017-04-28 PROCEDURE — 93005 ELECTROCARDIOGRAM TRACING: CPT

## 2017-04-28 PROCEDURE — 85730 THROMBOPLASTIN TIME PARTIAL: CPT

## 2017-04-28 PROCEDURE — 85025 COMPLETE CBC W/AUTO DIFF WBC: CPT

## 2017-04-28 PROCEDURE — 86901 BLOOD TYPING SEROLOGIC RH(D): CPT

## 2017-04-28 PROCEDURE — 85610 PROTHROMBIN TIME: CPT

## 2017-04-28 PROCEDURE — 80053 COMPREHEN METABOLIC PANEL: CPT

## 2017-04-28 PROCEDURE — 0DBP4ZZ EXCISION OF RECTUM, PERCUTANEOUS ENDOSCOPIC APPROACH: ICD-10-PCS

## 2017-04-28 PROCEDURE — 86900 BLOOD TYPING SEROLOGIC ABO: CPT

## 2017-04-28 PROCEDURE — 0DNL4ZZ RELEASE TRANSVERSE COLON, PERCUTANEOUS ENDOSCOPIC APPROACH: ICD-10-PCS

## 2017-04-28 RX ADMIN — DEXTROSE, SODIUM CHLORIDE, AND POTASSIUM CHLORIDE SCH MLS/HR: 5; .45; .15 INJECTION INTRAVENOUS at 23:27

## 2017-04-28 RX ADMIN — Medication SCH MLS/HR: at 23:56

## 2017-04-28 RX ADMIN — SODIUM CHLORIDE PRN MCG: 9 INJECTION, SOLUTION INTRAVENOUS at 20:48

## 2017-04-28 RX ADMIN — SODIUM CHLORIDE PRN MCG: 9 INJECTION, SOLUTION INTRAVENOUS at 20:15

## 2017-04-29 VITALS — DIASTOLIC BLOOD PRESSURE: 60 MMHG | SYSTOLIC BLOOD PRESSURE: 129 MMHG | RESPIRATION RATE: 18 BRPM

## 2017-04-29 VITALS — DIASTOLIC BLOOD PRESSURE: 64 MMHG | SYSTOLIC BLOOD PRESSURE: 145 MMHG | HEART RATE: 62 BPM | RESPIRATION RATE: 18 BRPM

## 2017-04-29 VITALS — DIASTOLIC BLOOD PRESSURE: 66 MMHG | SYSTOLIC BLOOD PRESSURE: 149 MMHG | RESPIRATION RATE: 16 BRPM

## 2017-04-29 VITALS — DIASTOLIC BLOOD PRESSURE: 70 MMHG | RESPIRATION RATE: 17 BRPM | SYSTOLIC BLOOD PRESSURE: 153 MMHG | HEART RATE: 60 BPM

## 2017-04-29 VITALS — HEART RATE: 62 BPM | SYSTOLIC BLOOD PRESSURE: 145 MMHG | DIASTOLIC BLOOD PRESSURE: 100 MMHG

## 2017-04-29 LAB
ADD SCAN DIFF: NO
ADD UMIC: YES
ANION GAP SERPL CALC-SCNC: 12 MMOL/L (ref 8–16)
BACTERIA #/AREA URNS HPF: (no result) /[HPF]
BASOPHILS # BLD AUTO: 0 10^3/UL (ref 0–0.1)
BASOPHILS NFR BLD: 0 % (ref 0–2)
BUN SERPL-MCNC: 8 MG/DL (ref 7–20)
CALCIUM SERPL-MCNC: 9.5 MG/DL (ref 8.4–10.2)
CHLORIDE SERPL-SCNC: 107 MMOL/L (ref 97–110)
CO2 SERPL-SCNC: 20 MMOL/L (ref 21–31)
COLOR UR: (no result)
CREAT SERPL-MCNC: 0.47 MG/DL (ref 0.44–1)
EOSINOPHIL # BLD: 0 10^3/UL (ref 0–0.5)
EOSINOPHIL NFR BLD: 0 % (ref 0–7)
ERYTHROCYTE [DISTWIDTH] IN BLOOD BY AUTOMATED COUNT: 14 % (ref 11.5–14.5)
GLUCOSE SERPL-MCNC: 139 MG/DL (ref 70–220)
GLUCOSE UR STRIP-MCNC: NEGATIVE %
HCT VFR BLD CALC: 38 % (ref 37–47)
HGB BLD-MCNC: 12.4 G/DL (ref 12–16)
KETONES UR STRIP.AUTO-MCNC: (no result) MG/DL
LYMPHOCYTES # BLD AUTO: 0.7 10^3/UL (ref 0.8–2.9)
LYMPHOCYTES NFR BLD AUTO: 5.3 % (ref 15–51)
MCH RBC QN AUTO: 29.7 PG (ref 29–33)
MCHC RBC AUTO-ENTMCNC: 32.6 G/DL (ref 32–37)
MCV RBC AUTO: 91.1 FL (ref 82–101)
MONOCYTES # BLD: 0.5 10^3/UL (ref 0.3–0.9)
MONOCYTES NFR BLD: 3.6 % (ref 0–11)
NEUTROPHILS # BLD: 11.4 10^3/UL (ref 1.6–7.5)
NEUTROPHILS NFR BLD AUTO: 90.6 % (ref 39–77)
NITRITE UR QL STRIP.AUTO: NEGATIVE
NRBC # BLD MANUAL: 0 10^3/UL (ref 0–0)
NRBC BLD QL: 0 /100WBC (ref 0–0)
PLATELET # BLD: 183 10^3/UL (ref 140–415)
PMV BLD AUTO: 10.4 FL (ref 7.4–10.4)
POTASSIUM SERPL-SCNC: 4.3 MMOL/L (ref 3.5–5.1)
RBC # BLD AUTO: 4.17 10^6/UL (ref 4.2–5.4)
RBC # UR AUTO: (no result) /UL
RBC #/AREA URNS HPF: (no result) /HPF
SODIUM SERPL-SCNC: 135 MMOL/L (ref 135–144)
URINE BILIRUBIN (DIP): NEGATIVE
URINE TOTAL PROTEIN (DIP): NEGATIVE
UROBILINOGEN UR STRIP-ACNC: (no result) (ref 0.1–1)
WBC # BLD AUTO: 12.6 10^3/UL (ref 4.8–10.8)
WBC # UR STRIP: (no result) /UL

## 2017-04-29 RX ADMIN — DEXTROSE, SODIUM CHLORIDE, AND POTASSIUM CHLORIDE SCH MLS/HR: 5; .45; .15 INJECTION INTRAVENOUS at 09:11

## 2017-04-29 RX ADMIN — DILTIAZEM HYDROCHLORIDE SCH MG: 30 TABLET, FILM COATED ORAL at 17:37

## 2017-04-29 RX ADMIN — CLINDAMYCIN PHOSPHATE SCH MLS/HR: 12 INJECTION, SOLUTION INTRAVENOUS at 01:26

## 2017-04-29 RX ADMIN — PANTOPRAZOLE SODIUM SCH MG: 40 TABLET, DELAYED RELEASE ORAL at 06:42

## 2017-04-29 RX ADMIN — LEVALBUTEROL TARTRATE SCH PUFF: 45 AEROSOL, METERED ORAL at 16:51

## 2017-04-29 RX ADMIN — Medication SCH MLS/HR: at 14:33

## 2017-04-29 RX ADMIN — DILTIAZEM HYDROCHLORIDE SCH MG: 30 TABLET, FILM COATED ORAL at 20:43

## 2017-04-29 RX ADMIN — CLINDAMYCIN PHOSPHATE SCH MLS/HR: 12 INJECTION, SOLUTION INTRAVENOUS at 17:41

## 2017-04-29 RX ADMIN — PANTOPRAZOLE SODIUM SCH MG: 40 TABLET, DELAYED RELEASE ORAL at 06:35

## 2017-04-29 RX ADMIN — CLINDAMYCIN PHOSPHATE SCH MLS/HR: 12 INJECTION, SOLUTION INTRAVENOUS at 06:35

## 2017-04-29 RX ADMIN — MOMETASONE FUROATE SCH PUFF: 220 INHALANT RESPIRATORY (INHALATION) at 20:43

## 2017-04-29 RX ADMIN — ACETAMINOPHEN PRN MG: 160 SOLUTION ORAL at 14:39

## 2017-04-29 RX ADMIN — CLINDAMYCIN PHOSPHATE SCH MLS/HR: 12 INJECTION, SOLUTION INTRAVENOUS at 13:25

## 2017-04-29 RX ADMIN — Medication SCH MLS/HR: at 07:46

## 2017-04-29 RX ADMIN — MONTELUKAST SODIUM SCH MG: 10 TABLET, FILM COATED ORAL at 20:43

## 2017-04-29 RX ADMIN — ENOXAPARIN SODIUM SCH MG: 100 INJECTION SUBCUTANEOUS at 06:36

## 2017-04-29 RX ADMIN — DILTIAZEM HYDROCHLORIDE SCH MG: 30 TABLET, FILM COATED ORAL at 14:32

## 2017-04-30 VITALS — RESPIRATION RATE: 16 BRPM | DIASTOLIC BLOOD PRESSURE: 61 MMHG | SYSTOLIC BLOOD PRESSURE: 138 MMHG

## 2017-04-30 VITALS — DIASTOLIC BLOOD PRESSURE: 56 MMHG | RESPIRATION RATE: 20 BRPM | SYSTOLIC BLOOD PRESSURE: 116 MMHG

## 2017-04-30 RX ADMIN — LORATADINE SCH MG: 10 TABLET ORAL at 12:52

## 2017-04-30 RX ADMIN — CLINDAMYCIN PHOSPHATE SCH MLS/HR: 12 INJECTION, SOLUTION INTRAVENOUS at 01:46

## 2017-04-30 RX ADMIN — CLINDAMYCIN PHOSPHATE SCH MLS/HR: 12 INJECTION, SOLUTION INTRAVENOUS at 12:52

## 2017-04-30 RX ADMIN — ACETAMINOPHEN PRN MG: 160 SOLUTION ORAL at 04:04

## 2017-04-30 RX ADMIN — DEXTROSE, SODIUM CHLORIDE, AND POTASSIUM CHLORIDE SCH MLS/HR: 5; .45; .15 INJECTION INTRAVENOUS at 11:51

## 2017-04-30 RX ADMIN — LEVALBUTEROL TARTRATE SCH PUFF: 45 AEROSOL, METERED ORAL at 00:00

## 2017-04-30 RX ADMIN — PANTOPRAZOLE SODIUM SCH MG: 40 TABLET, DELAYED RELEASE ORAL at 06:36

## 2017-04-30 RX ADMIN — DILTIAZEM HYDROCHLORIDE SCH MG: 30 TABLET, FILM COATED ORAL at 21:54

## 2017-04-30 RX ADMIN — MONTELUKAST SODIUM SCH MG: 10 TABLET, FILM COATED ORAL at 21:54

## 2017-04-30 RX ADMIN — ACETAMINOPHEN PRN MG: 160 SOLUTION ORAL at 16:49

## 2017-04-30 RX ADMIN — DILTIAZEM HYDROCHLORIDE SCH MG: 30 TABLET, FILM COATED ORAL at 09:12

## 2017-04-30 RX ADMIN — MOMETASONE FUROATE SCH PUFF: 220 INHALANT RESPIRATORY (INHALATION) at 09:11

## 2017-04-30 RX ADMIN — CLINDAMYCIN PHOSPHATE SCH MLS/HR: 12 INJECTION, SOLUTION INTRAVENOUS at 06:35

## 2017-04-30 RX ADMIN — LEVALBUTEROL TARTRATE SCH PUFF: 45 AEROSOL, METERED ORAL at 09:11

## 2017-04-30 RX ADMIN — MOMETASONE FUROATE SCH PUFF: 220 INHALANT RESPIRATORY (INHALATION) at 21:55

## 2017-04-30 RX ADMIN — ENOXAPARIN SODIUM SCH MG: 100 INJECTION SUBCUTANEOUS at 06:34

## 2017-04-30 RX ADMIN — DILTIAZEM HYDROCHLORIDE SCH MG: 30 TABLET, FILM COATED ORAL at 16:53

## 2017-04-30 RX ADMIN — LEVALBUTEROL TARTRATE SCH PUFF: 45 AEROSOL, METERED ORAL at 16:49

## 2017-04-30 RX ADMIN — CLINDAMYCIN PHOSPHATE SCH MLS/HR: 12 INJECTION, SOLUTION INTRAVENOUS at 18:10

## 2017-04-30 RX ADMIN — DEXTROSE, SODIUM CHLORIDE, AND POTASSIUM CHLORIDE SCH MLS/HR: 5; .45; .15 INJECTION INTRAVENOUS at 01:41

## 2017-04-30 RX ADMIN — DILTIAZEM HYDROCHLORIDE SCH MG: 30 TABLET, FILM COATED ORAL at 12:52

## 2017-05-01 VITALS — RESPIRATION RATE: 16 BRPM | SYSTOLIC BLOOD PRESSURE: 125 MMHG | DIASTOLIC BLOOD PRESSURE: 62 MMHG

## 2017-05-01 VITALS — RESPIRATION RATE: 18 BRPM | DIASTOLIC BLOOD PRESSURE: 65 MMHG | SYSTOLIC BLOOD PRESSURE: 140 MMHG

## 2017-05-01 LAB
ADD SCAN DIFF: NO
ANION GAP SERPL CALC-SCNC: 13 MMOL/L (ref 8–16)
BASOPHILS # BLD AUTO: 0 10^3/UL (ref 0–0.1)
BASOPHILS NFR BLD: 0.3 % (ref 0–2)
BUN SERPL-MCNC: 6 MG/DL (ref 7–20)
CALCIUM SERPL-MCNC: 9.4 MG/DL (ref 8.4–10.2)
CHLORIDE SERPL-SCNC: 108 MMOL/L (ref 97–110)
CO2 SERPL-SCNC: 23 MMOL/L (ref 21–31)
CREAT SERPL-MCNC: 0.5 MG/DL (ref 0.44–1)
EOSINOPHIL # BLD: 0 10^3/UL (ref 0–0.5)
EOSINOPHIL NFR BLD: 0.3 % (ref 0–7)
ERYTHROCYTE [DISTWIDTH] IN BLOOD BY AUTOMATED COUNT: 14.7 % (ref 11.5–14.5)
GLUCOSE SERPL-MCNC: 85 MG/DL (ref 70–220)
HCT VFR BLD CALC: 34.4 % (ref 37–47)
HGB BLD-MCNC: 11.2 G/DL (ref 12–16)
LYMPHOCYTES # BLD AUTO: 2.4 10^3/UL (ref 0.8–2.9)
LYMPHOCYTES NFR BLD AUTO: 27.3 % (ref 15–51)
MCH RBC QN AUTO: 29.2 PG (ref 29–33)
MCHC RBC AUTO-ENTMCNC: 32.6 G/DL (ref 32–37)
MCV RBC AUTO: 89.6 FL (ref 82–101)
MONOCYTES # BLD: 0.9 10^3/UL (ref 0.3–0.9)
MONOCYTES NFR BLD: 9.7 % (ref 0–11)
NEUTROPHILS # BLD: 5.4 10^3/UL (ref 1.6–7.5)
NEUTROPHILS NFR BLD AUTO: 62.1 % (ref 39–77)
NRBC # BLD MANUAL: 0 10^3/UL (ref 0–0)
NRBC BLD QL: 0 /100WBC (ref 0–0)
PLATELET # BLD: 183 10^3/UL (ref 140–415)
PMV BLD AUTO: 10.7 FL (ref 7.4–10.4)
POTASSIUM SERPL-SCNC: 3.5 MMOL/L (ref 3.5–5.1)
RBC # BLD AUTO: 3.84 10^6/UL (ref 4.2–5.4)
SODIUM SERPL-SCNC: 140 MMOL/L (ref 135–144)
WBC # BLD AUTO: 8.8 10^3/UL (ref 4.8–10.8)

## 2017-05-01 RX ADMIN — LORATADINE SCH MG: 10 TABLET ORAL at 10:22

## 2017-05-01 RX ADMIN — DILTIAZEM HYDROCHLORIDE SCH MG: 30 TABLET, FILM COATED ORAL at 13:52

## 2017-05-01 RX ADMIN — MORPHINE SULFATE PRN MG: 2 INJECTION, SOLUTION INTRAMUSCULAR; INTRAVENOUS at 13:48

## 2017-05-01 RX ADMIN — ACETAMINOPHEN PRN MG: 160 SOLUTION ORAL at 01:30

## 2017-05-01 RX ADMIN — MOMETASONE FUROATE SCH PUFF: 220 INHALANT RESPIRATORY (INHALATION) at 21:38

## 2017-05-01 RX ADMIN — MONTELUKAST SODIUM SCH MG: 10 TABLET, FILM COATED ORAL at 21:39

## 2017-05-01 RX ADMIN — LEVALBUTEROL TARTRATE SCH PUFF: 45 AEROSOL, METERED ORAL at 10:19

## 2017-05-01 RX ADMIN — LEVALBUTEROL TARTRATE SCH PUFF: 45 AEROSOL, METERED ORAL at 00:00

## 2017-05-01 RX ADMIN — PANTOPRAZOLE SODIUM SCH MG: 40 TABLET, DELAYED RELEASE ORAL at 06:32

## 2017-05-01 RX ADMIN — LEVALBUTEROL TARTRATE SCH PUFF: 45 AEROSOL, METERED ORAL at 16:00

## 2017-05-01 RX ADMIN — DILTIAZEM HYDROCHLORIDE SCH MG: 30 TABLET, FILM COATED ORAL at 18:27

## 2017-05-01 RX ADMIN — LEVALBUTEROL TARTRATE SCH PUFF: 45 AEROSOL, METERED ORAL at 01:19

## 2017-05-01 RX ADMIN — ACETAMINOPHEN PRN MG: 160 SOLUTION ORAL at 10:18

## 2017-05-01 RX ADMIN — MOMETASONE FUROATE SCH PUFF: 220 INHALANT RESPIRATORY (INHALATION) at 10:19

## 2017-05-01 RX ADMIN — DILTIAZEM HYDROCHLORIDE SCH MG: 30 TABLET, FILM COATED ORAL at 10:22

## 2017-05-01 RX ADMIN — ENOXAPARIN SODIUM SCH MG: 100 INJECTION SUBCUTANEOUS at 06:36

## 2017-05-01 RX ADMIN — DILTIAZEM HYDROCHLORIDE SCH MG: 30 TABLET, FILM COATED ORAL at 21:39

## 2017-05-02 VITALS — RESPIRATION RATE: 20 BRPM | DIASTOLIC BLOOD PRESSURE: 60 MMHG | SYSTOLIC BLOOD PRESSURE: 127 MMHG

## 2017-05-02 VITALS — HEART RATE: 63 BPM | SYSTOLIC BLOOD PRESSURE: 119 MMHG | DIASTOLIC BLOOD PRESSURE: 57 MMHG

## 2017-05-02 VITALS — RESPIRATION RATE: 18 BRPM | SYSTOLIC BLOOD PRESSURE: 150 MMHG | DIASTOLIC BLOOD PRESSURE: 68 MMHG

## 2017-05-02 LAB
ADD SCAN DIFF: NO
ANION GAP SERPL CALC-SCNC: 7 MMOL/L (ref 8–16)
BASOPHILS # BLD AUTO: 0 10^3/UL (ref 0–0.1)
BASOPHILS NFR BLD: 0.4 % (ref 0–2)
BUN SERPL-MCNC: 8 MG/DL (ref 7–20)
CALCIUM SERPL-MCNC: 9.5 MG/DL (ref 8.4–10.2)
CHLORIDE SERPL-SCNC: 110 MMOL/L (ref 97–110)
CO2 SERPL-SCNC: 25 MMOL/L (ref 21–31)
CREAT SERPL-MCNC: 0.48 MG/DL (ref 0.44–1)
EOSINOPHIL # BLD: 0.2 10^3/UL (ref 0–0.5)
EOSINOPHIL NFR BLD: 2.2 % (ref 0–7)
ERYTHROCYTE [DISTWIDTH] IN BLOOD BY AUTOMATED COUNT: 14.9 % (ref 11.5–14.5)
GLUCOSE SERPL-MCNC: 88 MG/DL (ref 70–220)
HCT VFR BLD CALC: 34 % (ref 37–47)
HGB BLD-MCNC: 10.9 G/DL (ref 12–16)
INR PPP: 1.07
LYMPHOCYTES # BLD AUTO: 2.2 10^3/UL (ref 0.8–2.9)
LYMPHOCYTES NFR BLD AUTO: 32.5 % (ref 15–51)
MAGNESIUM SERPL-MCNC: 1.9 MG/DL (ref 1.7–2.5)
MCH RBC QN AUTO: 29.3 PG (ref 29–33)
MCHC RBC AUTO-ENTMCNC: 32.1 G/DL (ref 32–37)
MCV RBC AUTO: 91.4 FL (ref 82–101)
MONOCYTES # BLD: 0.6 10^3/UL (ref 0.3–0.9)
MONOCYTES NFR BLD: 9.3 % (ref 0–11)
NEUTROPHILS # BLD: 3.8 10^3/UL (ref 1.6–7.5)
NEUTROPHILS NFR BLD AUTO: 55.2 % (ref 39–77)
NRBC # BLD MANUAL: 0 10^3/UL (ref 0–0)
NRBC BLD QL: 0 /100WBC (ref 0–0)
PLATELET # BLD: 181 10^3/UL (ref 140–415)
PMV BLD AUTO: 11.1 FL (ref 7.4–10.4)
POTASSIUM SERPL-SCNC: 4 MMOL/L (ref 3.5–5.1)
PROTHROMBIN TIME: 13.9 SEC (ref 12.2–14.2)
PT RATIO: 1.1
RBC # BLD AUTO: 3.72 10^6/UL (ref 4.2–5.4)
SODIUM SERPL-SCNC: 138 MMOL/L (ref 135–144)
WBC # BLD AUTO: 6.9 10^3/UL (ref 4.8–10.8)

## 2017-05-02 RX ADMIN — DILTIAZEM HYDROCHLORIDE SCH MG: 30 TABLET, FILM COATED ORAL at 09:02

## 2017-05-02 RX ADMIN — LORATADINE SCH MG: 10 TABLET ORAL at 09:03

## 2017-05-02 RX ADMIN — MOMETASONE FUROATE SCH PUFF: 220 INHALANT RESPIRATORY (INHALATION) at 09:03

## 2017-05-02 RX ADMIN — DILTIAZEM HYDROCHLORIDE SCH MG: 30 TABLET, FILM COATED ORAL at 22:14

## 2017-05-02 RX ADMIN — ENOXAPARIN SODIUM SCH MG: 100 INJECTION SUBCUTANEOUS at 06:26

## 2017-05-02 RX ADMIN — LEVALBUTEROL TARTRATE SCH PUFF: 45 AEROSOL, METERED ORAL at 16:00

## 2017-05-02 RX ADMIN — MOMETASONE FUROATE SCH PUFF: 220 INHALANT RESPIRATORY (INHALATION) at 22:13

## 2017-05-02 RX ADMIN — MONTELUKAST SODIUM SCH MG: 10 TABLET, FILM COATED ORAL at 22:13

## 2017-05-02 RX ADMIN — LEVALBUTEROL TARTRATE SCH PUFF: 45 AEROSOL, METERED ORAL at 09:03

## 2017-05-02 RX ADMIN — MORPHINE SULFATE PRN MG: 2 INJECTION, SOLUTION INTRAMUSCULAR; INTRAVENOUS at 01:07

## 2017-05-02 RX ADMIN — PANTOPRAZOLE SODIUM SCH MG: 40 TABLET, DELAYED RELEASE ORAL at 06:24

## 2017-05-02 RX ADMIN — DILTIAZEM HYDROCHLORIDE SCH MG: 30 TABLET, FILM COATED ORAL at 17:00

## 2017-05-02 RX ADMIN — LEVALBUTEROL TARTRATE SCH PUFF: 45 AEROSOL, METERED ORAL at 00:00

## 2017-05-02 RX ADMIN — DILTIAZEM HYDROCHLORIDE SCH MG: 30 TABLET, FILM COATED ORAL at 14:25

## 2017-05-02 RX ADMIN — ACETAMINOPHEN PRN MG: 160 SOLUTION ORAL at 22:13

## 2017-05-03 VITALS — SYSTOLIC BLOOD PRESSURE: 139 MMHG | RESPIRATION RATE: 18 BRPM | DIASTOLIC BLOOD PRESSURE: 56 MMHG | HEART RATE: 78 BPM

## 2017-05-03 VITALS — HEART RATE: 98 BPM | DIASTOLIC BLOOD PRESSURE: 57 MMHG | SYSTOLIC BLOOD PRESSURE: 97 MMHG | RESPIRATION RATE: 22 BRPM

## 2017-05-03 VITALS — SYSTOLIC BLOOD PRESSURE: 126 MMHG | DIASTOLIC BLOOD PRESSURE: 66 MMHG

## 2017-05-03 VITALS — HEART RATE: 130 BPM

## 2017-05-03 VITALS — SYSTOLIC BLOOD PRESSURE: 126 MMHG | RESPIRATION RATE: 20 BRPM | DIASTOLIC BLOOD PRESSURE: 58 MMHG

## 2017-05-03 LAB
ADD SCAN DIFF: NO
ADD SCAN DIFF: NO
ALBUMIN SERPL-MCNC: 2.9 G/DL (ref 3.3–4.9)
ALBUMIN/GLOB SERPL: 1.26 {RATIO}
ALP SERPL-CCNC: 54 IU/L (ref 42–121)
ALT SERPL-CCNC: 30 IU/L (ref 13–69)
ANION GAP SERPL CALC-SCNC: 12 MMOL/L (ref 8–16)
ANION GAP SERPL CALC-SCNC: 12 MMOL/L (ref 8–16)
AST SERPL-CCNC: 15 IU/L (ref 15–46)
BASOPHILS # BLD AUTO: 0 10^3/UL (ref 0–0.1)
BASOPHILS # BLD AUTO: 0 10^3/UL (ref 0–0.1)
BASOPHILS NFR BLD: 0.5 % (ref 0–2)
BASOPHILS NFR BLD: 0.5 % (ref 0–2)
BILIRUB DIRECT SERPL-MCNC: 0 MG/DL (ref 0–0.2)
BILIRUB SERPL-MCNC: 0.1 MG/DL (ref 0.2–1.3)
BUN SERPL-MCNC: 11 MG/DL (ref 7–20)
BUN SERPL-MCNC: 9 MG/DL (ref 7–20)
CALCIUM SERPL-MCNC: 9.3 MG/DL (ref 8.4–10.2)
CALCIUM SERPL-MCNC: 9.6 MG/DL (ref 8.4–10.2)
CHLORIDE SERPL-SCNC: 106 MMOL/L (ref 97–110)
CHLORIDE SERPL-SCNC: 109 MMOL/L (ref 97–110)
CO2 SERPL-SCNC: 24 MMOL/L (ref 21–31)
CO2 SERPL-SCNC: 24 MMOL/L (ref 21–31)
CREAT SERPL-MCNC: 0.48 MG/DL (ref 0.44–1)
CREAT SERPL-MCNC: 0.52 MG/DL (ref 0.44–1)
EOSINOPHIL # BLD: 0.2 10^3/UL (ref 0–0.5)
EOSINOPHIL # BLD: 0.3 10^3/UL (ref 0–0.5)
EOSINOPHIL NFR BLD: 3.6 % (ref 0–7)
EOSINOPHIL NFR BLD: 4.6 % (ref 0–7)
ERYTHROCYTE [DISTWIDTH] IN BLOOD BY AUTOMATED COUNT: 14.8 % (ref 11.5–14.5)
ERYTHROCYTE [DISTWIDTH] IN BLOOD BY AUTOMATED COUNT: 14.9 % (ref 11.5–14.5)
GLOBULIN SER-MCNC: 2.3 G/DL (ref 1.3–3.2)
GLUCOSE SERPL-MCNC: 133 MG/DL (ref 70–220)
GLUCOSE SERPL-MCNC: 89 MG/DL (ref 70–220)
HCT VFR BLD CALC: 33.5 % (ref 37–47)
HCT VFR BLD CALC: 35 % (ref 37–47)
HGB BLD-MCNC: 11 G/DL (ref 12–16)
HGB BLD-MCNC: 11.3 G/DL (ref 12–16)
INR PPP: 1.16
LYMPHOCYTES # BLD AUTO: 1.9 10^3/UL (ref 0.8–2.9)
LYMPHOCYTES # BLD AUTO: 2.2 10^3/UL (ref 0.8–2.9)
LYMPHOCYTES NFR BLD AUTO: 29.7 % (ref 15–51)
LYMPHOCYTES NFR BLD AUTO: 33.2 % (ref 15–51)
MAGNESIUM SERPL-MCNC: 2 MG/DL (ref 1.7–2.5)
MCH RBC QN AUTO: 29.4 PG (ref 29–33)
MCH RBC QN AUTO: 29.9 PG (ref 29–33)
MCHC RBC AUTO-ENTMCNC: 32.3 G/DL (ref 32–37)
MCHC RBC AUTO-ENTMCNC: 32.8 G/DL (ref 32–37)
MCV RBC AUTO: 90.9 FL (ref 82–101)
MCV RBC AUTO: 91 FL (ref 82–101)
MONOCYTES # BLD: 0.6 10^3/UL (ref 0.3–0.9)
MONOCYTES # BLD: 0.7 10^3/UL (ref 0.3–0.9)
MONOCYTES NFR BLD: 9.8 % (ref 0–11)
MONOCYTES NFR BLD: 9.9 % (ref 0–11)
NEUTROPHILS # BLD: 3.5 10^3/UL (ref 1.6–7.5)
NEUTROPHILS # BLD: 3.5 10^3/UL (ref 1.6–7.5)
NEUTROPHILS NFR BLD AUTO: 52.4 % (ref 39–77)
NEUTROPHILS NFR BLD AUTO: 54.7 % (ref 39–77)
NRBC # BLD MANUAL: 0 10^3/UL (ref 0–0)
NRBC # BLD MANUAL: 0 10^3/UL (ref 0–0)
NRBC BLD QL: 0 /100WBC (ref 0–0)
NRBC BLD QL: 0 /100WBC (ref 0–0)
PLATELET # BLD: 186 10^3/UL (ref 140–415)
PLATELET # BLD: 192 10^3/UL (ref 140–415)
PMV BLD AUTO: 10.6 FL (ref 7.4–10.4)
PMV BLD AUTO: 11.2 FL (ref 7.4–10.4)
POTASSIUM SERPL-SCNC: 3.9 MMOL/L (ref 3.5–5.1)
POTASSIUM SERPL-SCNC: 3.9 MMOL/L (ref 3.5–5.1)
PROT SERPL-MCNC: 5.2 G/DL (ref 6.1–8.1)
PROTHROMBIN TIME: 14.9 SEC (ref 12.2–14.2)
PT RATIO: 1.2
RBC # BLD AUTO: 3.68 10^6/UL (ref 4.2–5.4)
RBC # BLD AUTO: 3.85 10^6/UL (ref 4.2–5.4)
SODIUM SERPL-SCNC: 138 MMOL/L (ref 135–144)
SODIUM SERPL-SCNC: 141 MMOL/L (ref 135–144)
WBC # BLD AUTO: 6.5 10^3/UL (ref 4.8–10.8)
WBC # BLD AUTO: 6.7 10^3/UL (ref 4.8–10.8)

## 2017-05-03 RX ADMIN — LEVALBUTEROL TARTRATE SCH PUFF: 45 AEROSOL, METERED ORAL at 00:00

## 2017-05-03 RX ADMIN — LEVALBUTEROL TARTRATE SCH PUFF: 45 AEROSOL, METERED ORAL at 09:47

## 2017-05-03 RX ADMIN — LORATADINE SCH MG: 10 TABLET ORAL at 09:46

## 2017-05-03 RX ADMIN — DILTIAZEM HYDROCHLORIDE SCH MG: 30 TABLET, FILM COATED ORAL at 21:00

## 2017-05-03 RX ADMIN — MOMETASONE FUROATE SCH PUFF: 220 INHALANT RESPIRATORY (INHALATION) at 21:11

## 2017-05-03 RX ADMIN — LEVALBUTEROL TARTRATE SCH PUFF: 45 AEROSOL, METERED ORAL at 16:00

## 2017-05-03 RX ADMIN — MONTELUKAST SODIUM SCH MG: 10 TABLET, FILM COATED ORAL at 21:18

## 2017-05-03 RX ADMIN — ENOXAPARIN SODIUM SCH MG: 100 INJECTION SUBCUTANEOUS at 06:31

## 2017-05-03 RX ADMIN — DILTIAZEM HYDROCHLORIDE SCH MG: 30 TABLET, FILM COATED ORAL at 09:47

## 2017-05-03 RX ADMIN — MOMETASONE FUROATE SCH PUFF: 220 INHALANT RESPIRATORY (INHALATION) at 09:47

## 2017-05-03 RX ADMIN — DILTIAZEM HYDROCHLORIDE SCH MG: 30 TABLET, FILM COATED ORAL at 18:40

## 2017-05-03 RX ADMIN — PANTOPRAZOLE SODIUM SCH MG: 40 TABLET, DELAYED RELEASE ORAL at 06:29

## 2017-05-03 RX ADMIN — DILTIAZEM HYDROCHLORIDE SCH MG: 30 TABLET, FILM COATED ORAL at 14:19

## 2017-05-03 RX ADMIN — MORPHINE SULFATE PRN MG: 2 INJECTION, SOLUTION INTRAMUSCULAR; INTRAVENOUS at 18:45

## 2017-05-04 VITALS — DIASTOLIC BLOOD PRESSURE: 55 MMHG | SYSTOLIC BLOOD PRESSURE: 113 MMHG | RESPIRATION RATE: 18 BRPM

## 2017-05-04 VITALS — HEART RATE: 61 BPM

## 2017-05-04 VITALS — DIASTOLIC BLOOD PRESSURE: 56 MMHG | RESPIRATION RATE: 18 BRPM | SYSTOLIC BLOOD PRESSURE: 116 MMHG

## 2017-05-04 VITALS — HEART RATE: 59 BPM

## 2017-05-04 VITALS — DIASTOLIC BLOOD PRESSURE: 56 MMHG | SYSTOLIC BLOOD PRESSURE: 119 MMHG | RESPIRATION RATE: 18 BRPM

## 2017-05-04 VITALS — RESPIRATION RATE: 18 BRPM | SYSTOLIC BLOOD PRESSURE: 112 MMHG | DIASTOLIC BLOOD PRESSURE: 55 MMHG

## 2017-05-04 VITALS — DIASTOLIC BLOOD PRESSURE: 54 MMHG | RESPIRATION RATE: 18 BRPM | SYSTOLIC BLOOD PRESSURE: 110 MMHG

## 2017-05-04 VITALS — DIASTOLIC BLOOD PRESSURE: 50 MMHG | SYSTOLIC BLOOD PRESSURE: 90 MMHG | RESPIRATION RATE: 18 BRPM

## 2017-05-04 VITALS — HEART RATE: 62 BPM

## 2017-05-04 VITALS — HEART RATE: 85 BPM

## 2017-05-04 VITALS — HEART RATE: 66 BPM

## 2017-05-04 LAB
ADD SCAN DIFF: NO
ANION GAP SERPL CALC-SCNC: 8 MMOL/L (ref 8–16)
BASOPHILS # BLD AUTO: 0.1 10^3/UL (ref 0–0.1)
BASOPHILS NFR BLD: 1 % (ref 0–2)
BUN SERPL-MCNC: 9 MG/DL (ref 7–20)
CALCIUM SERPL-MCNC: 9.2 MG/DL (ref 8.4–10.2)
CHLORIDE SERPL-SCNC: 111 MMOL/L (ref 97–110)
CO2 SERPL-SCNC: 22 MMOL/L (ref 21–31)
CREAT SERPL-MCNC: 0.47 MG/DL (ref 0.44–1)
EOSINOPHIL # BLD: 0.4 10^3/UL (ref 0–0.5)
EOSINOPHIL NFR BLD: 6.3 % (ref 0–7)
ERYTHROCYTE [DISTWIDTH] IN BLOOD BY AUTOMATED COUNT: 14.9 % (ref 11.5–14.5)
GLUCOSE SERPL-MCNC: 83 MG/DL (ref 70–220)
HCT VFR BLD CALC: 35.2 % (ref 37–47)
HGB BLD-MCNC: 11 G/DL (ref 12–16)
INR PPP: 2.22
LYMPHOCYTES # BLD AUTO: 1.5 10^3/UL (ref 0.8–2.9)
LYMPHOCYTES NFR BLD AUTO: 26.7 % (ref 15–51)
MCH RBC QN AUTO: 29.6 PG (ref 29–33)
MCHC RBC AUTO-ENTMCNC: 31.3 G/DL (ref 32–37)
MCV RBC AUTO: 94.6 FL (ref 82–101)
MONOCYTES # BLD: 0.6 10^3/UL (ref 0.3–0.9)
MONOCYTES NFR BLD: 11.2 % (ref 0–11)
NEUTROPHILS # BLD: 3.1 10^3/UL (ref 1.6–7.5)
NEUTROPHILS NFR BLD AUTO: 54.3 % (ref 39–77)
NRBC # BLD MANUAL: 0 10^3/UL (ref 0–0)
NRBC BLD QL: 0 /100WBC (ref 0–0)
PLATELET # BLD: 198 10^3/UL (ref 140–415)
PMV BLD AUTO: 10.9 FL (ref 7.4–10.4)
POTASSIUM SERPL-SCNC: 4.6 MMOL/L (ref 3.5–5.1)
PROTHROMBIN TIME: 24.9 SEC (ref 12.2–14.2)
PT RATIO: 1.9
RBC # BLD AUTO: 3.72 10^6/UL (ref 4.2–5.4)
SODIUM SERPL-SCNC: 136 MMOL/L (ref 135–144)
WBC # BLD AUTO: 5.7 10^3/UL (ref 4.8–10.8)

## 2017-05-04 RX ADMIN — DILTIAZEM HYDROCHLORIDE SCH MG: 30 TABLET, FILM COATED ORAL at 08:53

## 2017-05-04 RX ADMIN — ENOXAPARIN SODIUM SCH MG: 100 INJECTION SUBCUTANEOUS at 05:56

## 2017-05-04 RX ADMIN — MONTELUKAST SODIUM SCH MG: 10 TABLET, FILM COATED ORAL at 20:40

## 2017-05-04 RX ADMIN — LEVALBUTEROL TARTRATE SCH PUFF: 45 AEROSOL, METERED ORAL at 16:00

## 2017-05-04 RX ADMIN — MOMETASONE FUROATE SCH PUFF: 220 INHALANT RESPIRATORY (INHALATION) at 20:40

## 2017-05-04 RX ADMIN — LORATADINE SCH MG: 10 TABLET ORAL at 08:53

## 2017-05-04 RX ADMIN — MORPHINE SULFATE PRN MG: 2 INJECTION, SOLUTION INTRAMUSCULAR; INTRAVENOUS at 05:49

## 2017-05-04 RX ADMIN — DILTIAZEM HYDROCHLORIDE SCH MG: 30 TABLET, FILM COATED ORAL at 12:30

## 2017-05-04 RX ADMIN — PANTOPRAZOLE SODIUM SCH MG: 40 TABLET, DELAYED RELEASE ORAL at 05:50

## 2017-05-04 RX ADMIN — DIGOXIN SCH MG: 125 TABLET ORAL at 13:54

## 2017-05-04 RX ADMIN — MORPHINE SULFATE PRN MG: 2 INJECTION, SOLUTION INTRAMUSCULAR; INTRAVENOUS at 13:57

## 2017-05-04 RX ADMIN — LEVALBUTEROL TARTRATE SCH PUFF: 45 AEROSOL, METERED ORAL at 00:00

## 2017-05-04 RX ADMIN — MOMETASONE FUROATE SCH PUFF: 220 INHALANT RESPIRATORY (INHALATION) at 08:54

## 2017-05-04 RX ADMIN — DILTIAZEM HYDROCHLORIDE SCH MG: 30 TABLET, FILM COATED ORAL at 17:04

## 2017-05-04 RX ADMIN — LEVALBUTEROL TARTRATE SCH PUFF: 45 AEROSOL, METERED ORAL at 08:54

## 2017-05-05 VITALS — RESPIRATION RATE: 18 BRPM | DIASTOLIC BLOOD PRESSURE: 61 MMHG | SYSTOLIC BLOOD PRESSURE: 137 MMHG

## 2017-05-05 VITALS — RESPIRATION RATE: 18 BRPM | DIASTOLIC BLOOD PRESSURE: 55 MMHG | SYSTOLIC BLOOD PRESSURE: 114 MMHG

## 2017-05-05 VITALS — SYSTOLIC BLOOD PRESSURE: 137 MMHG | RESPIRATION RATE: 18 BRPM | DIASTOLIC BLOOD PRESSURE: 61 MMHG

## 2017-05-05 VITALS — HEART RATE: 59 BPM

## 2017-05-05 VITALS — DIASTOLIC BLOOD PRESSURE: 59 MMHG | RESPIRATION RATE: 18 BRPM | SYSTOLIC BLOOD PRESSURE: 118 MMHG

## 2017-05-05 VITALS — SYSTOLIC BLOOD PRESSURE: 127 MMHG | RESPIRATION RATE: 18 BRPM | DIASTOLIC BLOOD PRESSURE: 60 MMHG

## 2017-05-05 VITALS — SYSTOLIC BLOOD PRESSURE: 133 MMHG | DIASTOLIC BLOOD PRESSURE: 63 MMHG | RESPIRATION RATE: 20 BRPM

## 2017-05-05 VITALS — HEART RATE: 60 BPM

## 2017-05-05 VITALS — DIASTOLIC BLOOD PRESSURE: 57 MMHG | SYSTOLIC BLOOD PRESSURE: 96 MMHG | RESPIRATION RATE: 18 BRPM

## 2017-05-05 VITALS — SYSTOLIC BLOOD PRESSURE: 148 MMHG | DIASTOLIC BLOOD PRESSURE: 76 MMHG | HEART RATE: 130 BPM

## 2017-05-05 VITALS — HEART RATE: 158 BPM

## 2017-05-05 VITALS — HEART RATE: 160 BPM

## 2017-05-05 VITALS — HEART RATE: 66 BPM

## 2017-05-05 VITALS — HEART RATE: 65 BPM

## 2017-05-05 VITALS — HEART RATE: 126 BPM

## 2017-05-05 LAB
ADD SCAN DIFF: NO
ANION GAP SERPL CALC-SCNC: 8 MMOL/L (ref 8–16)
BASOPHILS # BLD AUTO: 0.1 10^3/UL (ref 0–0.1)
BASOPHILS NFR BLD: 0.8 % (ref 0–2)
BUN SERPL-MCNC: 10 MG/DL (ref 7–20)
CALCIUM SERPL-MCNC: 9.3 MG/DL (ref 8.4–10.2)
CHLORIDE SERPL-SCNC: 108 MMOL/L (ref 97–110)
CO2 SERPL-SCNC: 24 MMOL/L (ref 21–31)
CREAT SERPL-MCNC: 0.49 MG/DL (ref 0.44–1)
EOSINOPHIL # BLD: 0.4 10^3/UL (ref 0–0.5)
EOSINOPHIL NFR BLD: 5.7 % (ref 0–7)
ERYTHROCYTE [DISTWIDTH] IN BLOOD BY AUTOMATED COUNT: 14.6 % (ref 11.5–14.5)
GLUCOSE SERPL-MCNC: 87 MG/DL (ref 70–220)
HCT VFR BLD CALC: 34.9 % (ref 37–47)
HGB BLD-MCNC: 11.1 G/DL (ref 12–16)
INR PPP: 1.67
LYMPHOCYTES # BLD AUTO: 1.9 10^3/UL (ref 0.8–2.9)
LYMPHOCYTES NFR BLD AUTO: 30.6 % (ref 15–51)
MAGNESIUM SERPL-MCNC: 2.3 MG/DL (ref 1.7–2.5)
MCH RBC QN AUTO: 29.8 PG (ref 29–33)
MCHC RBC AUTO-ENTMCNC: 31.8 G/DL (ref 32–37)
MCV RBC AUTO: 93.6 FL (ref 82–101)
MONOCYTES # BLD: 0.6 10^3/UL (ref 0.3–0.9)
MONOCYTES NFR BLD: 9.5 % (ref 0–11)
NEUTROPHILS # BLD: 3.3 10^3/UL (ref 1.6–7.5)
NEUTROPHILS NFR BLD AUTO: 52.9 % (ref 39–77)
NRBC # BLD MANUAL: 0 10^3/UL (ref 0–0)
NRBC BLD QL: 0 /100WBC (ref 0–0)
PLATELET # BLD: 210 10^3/UL (ref 140–415)
PMV BLD AUTO: 10.8 FL (ref 7.4–10.4)
POTASSIUM SERPL-SCNC: 4.3 MMOL/L (ref 3.5–5.1)
PROTHROMBIN TIME: 19.8 SEC (ref 12.2–14.2)
PT RATIO: 1.5
RBC # BLD AUTO: 3.73 10^6/UL (ref 4.2–5.4)
SODIUM SERPL-SCNC: 136 MMOL/L (ref 135–144)
WBC # BLD AUTO: 6.2 10^3/UL (ref 4.8–10.8)

## 2017-05-05 RX ADMIN — DILTIAZEM HYDROCHLORIDE SCH MG: 30 TABLET, FILM COATED ORAL at 17:05

## 2017-05-05 RX ADMIN — PANTOPRAZOLE SODIUM SCH MG: 40 TABLET, DELAYED RELEASE ORAL at 05:40

## 2017-05-05 RX ADMIN — LEVALBUTEROL TARTRATE SCH PUFF: 45 AEROSOL, METERED ORAL at 08:00

## 2017-05-05 RX ADMIN — DILTIAZEM HYDROCHLORIDE SCH MG: 30 TABLET, FILM COATED ORAL at 23:58

## 2017-05-05 RX ADMIN — MOMETASONE FUROATE SCH PUFF: 220 INHALANT RESPIRATORY (INHALATION) at 09:07

## 2017-05-05 RX ADMIN — MORPHINE SULFATE PRN MG: 2 INJECTION, SOLUTION INTRAMUSCULAR; INTRAVENOUS at 18:43

## 2017-05-05 RX ADMIN — MONTELUKAST SODIUM SCH MG: 10 TABLET, FILM COATED ORAL at 21:03

## 2017-05-05 RX ADMIN — DILTIAZEM HYDROCHLORIDE SCH MG: 30 TABLET, FILM COATED ORAL at 11:53

## 2017-05-05 RX ADMIN — DILTIAZEM HYDROCHLORIDE SCH MG: 30 TABLET, FILM COATED ORAL at 00:00

## 2017-05-05 RX ADMIN — LEVALBUTEROL TARTRATE SCH PUFF: 45 AEROSOL, METERED ORAL at 18:00

## 2017-05-05 RX ADMIN — DILTIAZEM HYDROCHLORIDE SCH MG: 30 TABLET, FILM COATED ORAL at 05:40

## 2017-05-05 RX ADMIN — LEVALBUTEROL TARTRATE SCH PUFF: 45 AEROSOL, METERED ORAL at 16:00

## 2017-05-05 RX ADMIN — LORATADINE SCH MG: 10 TABLET ORAL at 09:07

## 2017-05-05 RX ADMIN — LEVALBUTEROL TARTRATE SCH PUFF: 45 AEROSOL, METERED ORAL at 00:00

## 2017-05-05 RX ADMIN — MOMETASONE FUROATE SCH PUFF: 220 INHALANT RESPIRATORY (INHALATION) at 21:04

## 2017-05-05 RX ADMIN — ENOXAPARIN SODIUM SCH MG: 100 INJECTION SUBCUTANEOUS at 05:42

## 2017-05-05 RX ADMIN — DIGOXIN SCH MG: 125 TABLET ORAL at 11:53

## 2017-05-06 VITALS — DIASTOLIC BLOOD PRESSURE: 65 MMHG | SYSTOLIC BLOOD PRESSURE: 144 MMHG | RESPIRATION RATE: 16 BRPM

## 2017-05-06 VITALS — HEART RATE: 60 BPM

## 2017-05-06 VITALS — SYSTOLIC BLOOD PRESSURE: 117 MMHG | DIASTOLIC BLOOD PRESSURE: 59 MMHG | RESPIRATION RATE: 19 BRPM

## 2017-05-06 VITALS — SYSTOLIC BLOOD PRESSURE: 121 MMHG | RESPIRATION RATE: 19 BRPM | DIASTOLIC BLOOD PRESSURE: 58 MMHG

## 2017-05-06 VITALS — HEART RATE: 59 BPM

## 2017-05-06 LAB
ADD SCAN DIFF: NO
ANION GAP SERPL CALC-SCNC: 8 MMOL/L (ref 8–16)
BASOPHILS # BLD AUTO: 0 10^3/UL (ref 0–0.1)
BASOPHILS NFR BLD: 0.7 % (ref 0–2)
BUN SERPL-MCNC: 9 MG/DL (ref 7–20)
CALCIUM SERPL-MCNC: 9.4 MG/DL (ref 8.4–10.2)
CHLORIDE SERPL-SCNC: 109 MMOL/L (ref 97–110)
CO2 SERPL-SCNC: 25 MMOL/L (ref 21–31)
CREAT SERPL-MCNC: 0.48 MG/DL (ref 0.44–1)
EOSINOPHIL # BLD: 0.4 10^3/UL (ref 0–0.5)
EOSINOPHIL NFR BLD: 6.5 % (ref 0–7)
ERYTHROCYTE [DISTWIDTH] IN BLOOD BY AUTOMATED COUNT: 14.6 % (ref 11.5–14.5)
GLUCOSE SERPL-MCNC: 87 MG/DL (ref 70–220)
HCT VFR BLD CALC: 34.3 % (ref 37–47)
HGB BLD-MCNC: 11 G/DL (ref 12–16)
INR PPP: 1.8
LYMPHOCYTES # BLD AUTO: 1.6 10^3/UL (ref 0.8–2.9)
LYMPHOCYTES NFR BLD AUTO: 29.9 % (ref 15–51)
MAGNESIUM SERPL-MCNC: 2.2 MG/DL (ref 1.7–2.5)
MCH RBC QN AUTO: 29.8 PG (ref 29–33)
MCHC RBC AUTO-ENTMCNC: 32.1 G/DL (ref 32–37)
MCV RBC AUTO: 93 FL (ref 82–101)
MONOCYTES # BLD: 0.6 10^3/UL (ref 0.3–0.9)
MONOCYTES NFR BLD: 10.6 % (ref 0–11)
NEUTROPHILS # BLD: 2.8 10^3/UL (ref 1.6–7.5)
NEUTROPHILS NFR BLD AUTO: 51.4 % (ref 39–77)
NRBC # BLD MANUAL: 0 10^3/UL (ref 0–0)
NRBC BLD QL: 0 /100WBC (ref 0–0)
PLATELET # BLD: 225 10^3/UL (ref 140–415)
PMV BLD AUTO: 10.7 FL (ref 7.4–10.4)
POTASSIUM SERPL-SCNC: 4.3 MMOL/L (ref 3.5–5.1)
PROTHROMBIN TIME: 21 SEC (ref 12.2–14.2)
PT RATIO: 1.6
RBC # BLD AUTO: 3.69 10^6/UL (ref 4.2–5.4)
SODIUM SERPL-SCNC: 138 MMOL/L (ref 135–144)
WBC # BLD AUTO: 5.4 10^3/UL (ref 4.8–10.8)

## 2017-05-06 RX ADMIN — ENOXAPARIN SODIUM SCH MG: 100 INJECTION SUBCUTANEOUS at 05:35

## 2017-05-06 RX ADMIN — LORATADINE SCH MG: 10 TABLET ORAL at 08:23

## 2017-05-06 RX ADMIN — DILTIAZEM HYDROCHLORIDE SCH MG: 30 TABLET, FILM COATED ORAL at 05:29

## 2017-05-06 RX ADMIN — MOMETASONE FUROATE SCH PUFF: 220 INHALANT RESPIRATORY (INHALATION) at 08:24

## 2017-05-06 RX ADMIN — LEVALBUTEROL TARTRATE SCH PUFF: 45 AEROSOL, METERED ORAL at 08:25

## 2017-05-06 RX ADMIN — DILTIAZEM HYDROCHLORIDE SCH MG: 30 TABLET, FILM COATED ORAL at 12:43

## 2017-05-06 RX ADMIN — LEVALBUTEROL TARTRATE SCH PUFF: 45 AEROSOL, METERED ORAL at 00:00

## 2017-05-06 RX ADMIN — PANTOPRAZOLE SODIUM SCH MG: 40 TABLET, DELAYED RELEASE ORAL at 05:30

## 2017-05-06 RX ADMIN — DIGOXIN SCH MG: 125 TABLET ORAL at 12:44

## 2017-05-06 RX ADMIN — MORPHINE SULFATE PRN MG: 2 INJECTION, SOLUTION INTRAMUSCULAR; INTRAVENOUS at 03:06

## 2019-05-02 ENCOUNTER — OFFICE (OUTPATIENT)
Dept: URBAN - METROPOLITAN AREA CLINIC 57 | Facility: CLINIC | Age: 83
End: 2019-05-02

## 2019-05-02 VITALS
HEART RATE: 86 BPM | TEMPERATURE: 98.4 F | WEIGHT: 106 LBS | RESPIRATION RATE: 16 BRPM | HEIGHT: 64 IN | SYSTOLIC BLOOD PRESSURE: 150 MMHG | DIASTOLIC BLOOD PRESSURE: 78 MMHG

## 2019-05-02 DIAGNOSIS — K21.9 GASTROESOPHAGEAL REFLUX DISEASE: ICD-10-CM

## 2019-05-02 DIAGNOSIS — K52.9 CHRONIC DIARRHEA: ICD-10-CM

## 2019-05-02 DIAGNOSIS — Z86.010 PERSONAL HISTORY COLON POLYPS: ICD-10-CM

## 2019-05-02 PROCEDURE — 99214 OFFICE O/P EST MOD 30 MIN: CPT | Performed by: INTERNAL MEDICINE

## 2019-05-02 PROCEDURE — 82272 OCCULT BLD FECES 1-3 TESTS: CPT | Performed by: INTERNAL MEDICINE

## 2019-05-02 RX ORDER — PANCRELIPASE 36000; 180000; 114000 [USP'U]/1; [USP'U]/1; [USP'U]/1
CAPSULE, DELAYED RELEASE PELLETS ORAL
Qty: 900 | Status: ACTIVE
Start: 2019-05-02

## 2019-05-02 NOTE — SERVICEHPINOTES
The patient comes in today accompanied by her . She has complaints of diarrhea dating to a cholecystectomy in October. Apparently this was done at [facility]. During that hospitalization she apparently had a bile duct stent placed which then fell out. Apparently she had a cholecystectomy complicated by a bile duct injury requiring a second surgery. She had stool studies performed recently which were negative for C. difficile and culture. She does not describe greasy stools but does describe particularly foul-smelling stools. A trial with Flagyl was unsuccessful. She denies any rectal bleeding, melena, fever, chills, nausea, vomiting or change in her weight. Apparently at the time of her cholecystectomy she tells me she had gallstone pancreatitis. Of note since seeing me for her last colonoscopy in 2017 she also had a colon resection he cousin of perforated diverticulitis. She has numerous other medical problems including asthma, and atrial fibrillation.

## 2019-05-18 ENCOUNTER — HOSPITAL ENCOUNTER (INPATIENT)
Dept: HOSPITAL 91 - 6WM | Age: 84
LOS: 13 days | Discharge: NURSING, REHAB OR LONG-TERM CARE | DRG: 351 | End: 2019-05-31
Payer: COMMERCIAL

## 2019-05-18 ENCOUNTER — HOSPITAL ENCOUNTER (INPATIENT)
Dept: HOSPITAL 10 - E/R | Age: 84
LOS: 13 days | Discharge: NURSING, REHAB OR LONG-TERM CARE | DRG: 351 | End: 2019-05-31
Attending: INTERNAL MEDICINE | Admitting: INTERNAL MEDICINE
Payer: COMMERCIAL

## 2019-05-18 VITALS
BODY MASS INDEX: 21.21 KG/M2 | HEIGHT: 60 IN | BODY MASS INDEX: 21.21 KG/M2 | HEIGHT: 60 IN | WEIGHT: 108.03 LBS | WEIGHT: 108.03 LBS

## 2019-05-18 VITALS — SYSTOLIC BLOOD PRESSURE: 193 MMHG | RESPIRATION RATE: 18 BRPM | HEART RATE: 61 BPM | DIASTOLIC BLOOD PRESSURE: 91 MMHG

## 2019-05-18 VITALS — HEART RATE: 73 BPM

## 2019-05-18 VITALS — RESPIRATION RATE: 20 BRPM | SYSTOLIC BLOOD PRESSURE: 201 MMHG | DIASTOLIC BLOOD PRESSURE: 88 MMHG | HEART RATE: 68 BPM

## 2019-05-18 DIAGNOSIS — I49.5: ICD-10-CM

## 2019-05-18 DIAGNOSIS — Z79.01: ICD-10-CM

## 2019-05-18 DIAGNOSIS — E83.39: ICD-10-CM

## 2019-05-18 DIAGNOSIS — D68.9: ICD-10-CM

## 2019-05-18 DIAGNOSIS — F41.9: ICD-10-CM

## 2019-05-18 DIAGNOSIS — F32.9: ICD-10-CM

## 2019-05-18 DIAGNOSIS — I48.91: ICD-10-CM

## 2019-05-18 DIAGNOSIS — N39.0: ICD-10-CM

## 2019-05-18 DIAGNOSIS — K40.30: Primary | ICD-10-CM

## 2019-05-18 DIAGNOSIS — J44.9: ICD-10-CM

## 2019-05-18 DIAGNOSIS — R44.3: ICD-10-CM

## 2019-05-18 DIAGNOSIS — K56.609: ICD-10-CM

## 2019-05-18 DIAGNOSIS — K21.9: ICD-10-CM

## 2019-05-18 DIAGNOSIS — Z95.0: ICD-10-CM

## 2019-05-18 LAB
ADD MAN DIFF?: NO
ADD UMIC: YES
ALANINE AMINOTRANSFERASE: 16 IU/L (ref 13–69)
ALBUMIN/GLOBULIN RATIO: 1.62
ALBUMIN: 4.4 G/DL (ref 3.3–4.9)
ALKALINE PHOSPHATASE: 77 IU/L (ref 42–121)
ANION GAP: 8 (ref 5–13)
ASPARTATE AMINO TRANSFERASE: 20 IU/L (ref 15–46)
BASOPHIL #: 0.1 10^3/UL (ref 0–0.1)
BASOPHILS %: 0.3 % (ref 0–2)
BILIRUBIN,DIRECT: 0 MG/DL (ref 0–0.2)
BILIRUBIN,TOTAL: 0.3 MG/DL (ref 0.2–1.3)
BLOOD UREA NITROGEN: 18 MG/DL (ref 7–20)
CALCIUM: 10.2 MG/DL (ref 8.4–10.2)
CARBON DIOXIDE: 26 MMOL/L (ref 21–31)
CHLORIDE: 105 MMOL/L (ref 97–110)
CREATININE: 0.5 MG/DL (ref 0.44–1)
EOSINOPHILS #: 0 10^3/UL (ref 0–0.5)
EOSINOPHILS %: 0 % (ref 0–7)
GLOBULIN: 2.7 G/DL (ref 1.3–3.2)
GLUCOSE: 137 MG/DL (ref 70–220)
HEMATOCRIT: 40.5 % (ref 37–47)
HEMOGLOBIN: 13 G/DL (ref 12–16)
IMMATURE GRANS #M: 0.07 10^3/UL (ref 0–0.03)
IMMATURE GRANS % (M): 0.4 % (ref 0–0.43)
LIPASE: 17 U/L (ref 23–300)
LYMPHOCYTES #: 2.1 10^3/UL (ref 0.8–2.9)
LYMPHOCYTES %: 11.4 % (ref 15–51)
MEAN CORPUSCULAR HEMOGLOBIN: 28.3 PG (ref 29–33)
MEAN CORPUSCULAR HGB CONC: 32.1 G/DL (ref 32–37)
MEAN CORPUSCULAR VOLUME: 88.2 FL (ref 82–101)
MEAN PLATELET VOLUME: 11.1 FL (ref 7.4–10.4)
MONOCYTE #: 1.4 10^3/UL (ref 0.3–0.9)
MONOCYTES %: 7.7 % (ref 0–11)
NEUTROPHIL #: 14.5 10^3/UL (ref 1.6–7.5)
NEUTROPHILS %: 80.2 % (ref 39–77)
NUCLEATED RED BLOOD CELLS #: 0 10^3/UL (ref 0–0)
NUCLEATED RED BLOOD CELLS%: 0 /100WBC (ref 0–0)
PLATELET COUNT: 233 10^3/UL (ref 140–415)
POTASSIUM: 3.8 MMOL/L (ref 3.5–5.1)
RED BLOOD COUNT: 4.59 10^6/UL (ref 4.2–5.4)
RED CELL DISTRIBUTION WIDTH: 14.1 % (ref 11.5–14.5)
SODIUM: 139 MMOL/L (ref 135–144)
TOTAL PROTEIN: 7.1 G/DL (ref 6.1–8.1)
TROPONIN-I: < 0.012 NG/ML (ref 0–0.12)
UR ASCORBIC ACID: NEGATIVE MG/DL
UR BACTERIA: (no result) /HPF
UR BILIRUBIN (DIP): NEGATIVE MG/DL
UR BLOOD (DIP): (no result) MG/DL
UR CLARITY: (no result)
UR COLOR: YELLOW
UR GLUCOSE (DIP): NEGATIVE MG/DL
UR KETONES (DIP): NEGATIVE MG/DL
UR LEUKOCYTE ESTERASE (DIP): (no result) LEU/UL
UR NITRITE (DIP): NEGATIVE MG/DL
UR PH (DIP): 7 (ref 5–9)
UR RBC: 22 /HPF (ref 0–5)
UR SPECIFIC GRAVITY (DIP): 1.01 (ref 1–1.03)
UR TOTAL PROTEIN (DIP): NEGATIVE MG/DL
UR UROBILINOGEN (DIP): NEGATIVE MG/DL
UR WBC: 25 /HPF (ref 0–5)
WHITE BLOOD COUNT: 18.1 10^3/UL (ref 4.8–10.8)

## 2019-05-18 PROCEDURE — 85730 THROMBOPLASTIN TIME PARTIAL: CPT

## 2019-05-18 PROCEDURE — 36415 COLL VENOUS BLD VENIPUNCTURE: CPT

## 2019-05-18 PROCEDURE — 71045 X-RAY EXAM CHEST 1 VIEW: CPT

## 2019-05-18 PROCEDURE — 97110 THERAPEUTIC EXERCISES: CPT

## 2019-05-18 PROCEDURE — 83690 ASSAY OF LIPASE: CPT

## 2019-05-18 PROCEDURE — 97116 GAIT TRAINING THERAPY: CPT

## 2019-05-18 PROCEDURE — 97162 PT EVAL MOD COMPLEX 30 MIN: CPT

## 2019-05-18 PROCEDURE — 96376 TX/PRO/DX INJ SAME DRUG ADON: CPT

## 2019-05-18 PROCEDURE — 83735 ASSAY OF MAGNESIUM: CPT

## 2019-05-18 PROCEDURE — 85610 PROTHROMBIN TIME: CPT

## 2019-05-18 PROCEDURE — 80162 ASSAY OF DIGOXIN TOTAL: CPT

## 2019-05-18 PROCEDURE — 81001 URINALYSIS AUTO W/SCOPE: CPT

## 2019-05-18 PROCEDURE — 80053 COMPREHEN METABOLIC PANEL: CPT

## 2019-05-18 PROCEDURE — 74176 CT ABD & PELVIS W/O CONTRAST: CPT

## 2019-05-18 PROCEDURE — 96375 TX/PRO/DX INJ NEW DRUG ADDON: CPT

## 2019-05-18 PROCEDURE — 85025 COMPLETE CBC W/AUTO DIFF WBC: CPT

## 2019-05-18 PROCEDURE — 96374 THER/PROPH/DIAG INJ IV PUSH: CPT

## 2019-05-18 PROCEDURE — 93306 TTE W/DOPPLER COMPLETE: CPT

## 2019-05-18 PROCEDURE — 99285 EMERGENCY DEPT VISIT HI MDM: CPT

## 2019-05-18 PROCEDURE — 74018 RADEX ABDOMEN 1 VIEW: CPT

## 2019-05-18 PROCEDURE — 93005 ELECTROCARDIOGRAM TRACING: CPT

## 2019-05-18 PROCEDURE — 84100 ASSAY OF PHOSPHORUS: CPT

## 2019-05-18 PROCEDURE — C1781 MESH (IMPLANTABLE): HCPCS

## 2019-05-18 PROCEDURE — 87086 URINE CULTURE/COLONY COUNT: CPT

## 2019-05-18 PROCEDURE — 84484 ASSAY OF TROPONIN QUANT: CPT

## 2019-05-18 PROCEDURE — 87081 CULTURE SCREEN ONLY: CPT

## 2019-05-18 PROCEDURE — 97530 THERAPEUTIC ACTIVITIES: CPT

## 2019-05-18 PROCEDURE — 94640 AIRWAY INHALATION TREATMENT: CPT

## 2019-05-18 PROCEDURE — 80048 BASIC METABOLIC PNL TOTAL CA: CPT

## 2019-05-18 RX ADMIN — THIAMINE HYDROCHLORIDE 1 MLS/HR: 100 INJECTION, SOLUTION INTRAMUSCULAR; INTRAVENOUS at 21:30

## 2019-05-18 RX ADMIN — HYDRALAZINE HYDROCHLORIDE PRN MG: 20 INJECTION INTRAMUSCULAR; INTRAVENOUS at 23:08

## 2019-05-18 RX ADMIN — MORPHINE SULFATE 1 MG: 2 INJECTION, SOLUTION INTRAMUSCULAR; INTRAVENOUS at 21:42

## 2019-05-18 RX ADMIN — MEROPENEM 1 MLS/HR: 1 INJECTION, POWDER, FOR SOLUTION INTRAVENOUS at 23:11

## 2019-05-18 RX ADMIN — CYCLOSPORINE 1 DROP: 0.5 EMULSION OPHTHALMIC at 21:00

## 2019-05-18 RX ADMIN — DILTIAZEM HYDROCHLORIDE 1 MG: 5 INJECTION INTRAVENOUS at 23:20

## 2019-05-18 RX ADMIN — MORPHINE SULFATE PRN MG: 2 INJECTION, SOLUTION INTRAMUSCULAR; INTRAVENOUS at 21:42

## 2019-05-18 RX ADMIN — DEXAMETHASONE SODIUM PHOSPHATE PRN MG: 10 INJECTION, SOLUTION INTRAMUSCULAR; INTRAVENOUS at 21:38

## 2019-05-18 RX ADMIN — THIAMINE HYDROCHLORIDE 1 MLS/HR: 100 INJECTION, SOLUTION INTRAMUSCULAR; INTRAVENOUS at 16:12

## 2019-05-18 RX ADMIN — FOLIC ACID SCH MLS/HR: 5 INJECTION, SOLUTION INTRAMUSCULAR; INTRAVENOUS; SUBCUTANEOUS at 21:30

## 2019-05-18 RX ADMIN — MORPHINE SULFATE 1 MG: 2 INJECTION, SOLUTION INTRAMUSCULAR; INTRAVENOUS at 16:11

## 2019-05-18 RX ADMIN — HYDRALAZINE HYDROCHLORIDE 1 MG: 20 INJECTION INTRAMUSCULAR; INTRAVENOUS at 23:08

## 2019-05-18 RX ADMIN — DILTIAZEM HYDROCHLORIDE SCH MG: 5 INJECTION INTRAVENOUS at 23:20

## 2019-05-18 RX ADMIN — ONDANSETRON HYDROCHLORIDE 1 MG: 2 INJECTION, SOLUTION INTRAMUSCULAR; INTRAVENOUS at 21:38

## 2019-05-18 RX ADMIN — MEROPENEM SCH MLS/HR: 1 INJECTION, POWDER, FOR SOLUTION INTRAVENOUS at 23:11

## 2019-05-18 RX ADMIN — ONDANSETRON HYDROCHLORIDE 1 MG: 2 INJECTION, SOLUTION INTRAMUSCULAR; INTRAVENOUS at 16:11

## 2019-05-18 RX ADMIN — ONDANSETRON HYDROCHLORIDE 1 MG: 2 INJECTION, SOLUTION INTRAMUSCULAR; INTRAVENOUS at 17:45

## 2019-05-19 VITALS — HEART RATE: 63 BPM

## 2019-05-19 VITALS — RESPIRATION RATE: 18 BRPM | SYSTOLIC BLOOD PRESSURE: 188 MMHG | DIASTOLIC BLOOD PRESSURE: 78 MMHG

## 2019-05-19 VITALS — HEART RATE: 60 BPM | RESPIRATION RATE: 18 BRPM | DIASTOLIC BLOOD PRESSURE: 71 MMHG | SYSTOLIC BLOOD PRESSURE: 165 MMHG

## 2019-05-19 VITALS — SYSTOLIC BLOOD PRESSURE: 124 MMHG | RESPIRATION RATE: 20 BRPM | DIASTOLIC BLOOD PRESSURE: 61 MMHG | HEART RATE: 65 BPM

## 2019-05-19 VITALS — SYSTOLIC BLOOD PRESSURE: 133 MMHG | RESPIRATION RATE: 20 BRPM | HEART RATE: 64 BPM | DIASTOLIC BLOOD PRESSURE: 54 MMHG

## 2019-05-19 VITALS — DIASTOLIC BLOOD PRESSURE: 54 MMHG | SYSTOLIC BLOOD PRESSURE: 113 MMHG | HEART RATE: 67 BPM | RESPIRATION RATE: 20 BRPM

## 2019-05-19 VITALS — RESPIRATION RATE: 18 BRPM | HEART RATE: 67 BPM | DIASTOLIC BLOOD PRESSURE: 79 MMHG | SYSTOLIC BLOOD PRESSURE: 186 MMHG

## 2019-05-19 VITALS — HEART RATE: 65 BPM | SYSTOLIC BLOOD PRESSURE: 160 MMHG | DIASTOLIC BLOOD PRESSURE: 57 MMHG | RESPIRATION RATE: 20 BRPM

## 2019-05-19 VITALS — HEART RATE: 60 BPM | SYSTOLIC BLOOD PRESSURE: 172 MMHG | RESPIRATION RATE: 18 BRPM | DIASTOLIC BLOOD PRESSURE: 71 MMHG

## 2019-05-19 VITALS — DIASTOLIC BLOOD PRESSURE: 60 MMHG | SYSTOLIC BLOOD PRESSURE: 129 MMHG | HEART RATE: 69 BPM | RESPIRATION RATE: 18 BRPM

## 2019-05-19 VITALS — HEART RATE: 60 BPM | RESPIRATION RATE: 16 BRPM | SYSTOLIC BLOOD PRESSURE: 181 MMHG | DIASTOLIC BLOOD PRESSURE: 77 MMHG

## 2019-05-19 VITALS — SYSTOLIC BLOOD PRESSURE: 236 MMHG | DIASTOLIC BLOOD PRESSURE: 105 MMHG | RESPIRATION RATE: 20 BRPM | HEART RATE: 60 BPM

## 2019-05-19 VITALS — HEART RATE: 71 BPM

## 2019-05-19 VITALS — HEART RATE: 70 BPM

## 2019-05-19 LAB
ADD MAN DIFF?: NO
ALANINE AMINOTRANSFERASE: 20 IU/L (ref 13–69)
ALBUMIN/GLOBULIN RATIO: 1.41
ALBUMIN: 3.4 G/DL (ref 3.3–4.9)
ALKALINE PHOSPHATASE: 67 IU/L (ref 42–121)
ANION GAP: 3 (ref 5–13)
ASPARTATE AMINO TRANSFERASE: 15 IU/L (ref 15–46)
BASOPHIL #: 0 10^3/UL (ref 0–0.1)
BASOPHILS %: 0.2 % (ref 0–2)
BILIRUBIN,DIRECT: 0 MG/DL (ref 0–0.2)
BILIRUBIN,TOTAL: 0.4 MG/DL (ref 0.2–1.3)
BLOOD UREA NITROGEN: 14 MG/DL (ref 7–20)
CALCIUM: 9.2 MG/DL (ref 8.4–10.2)
CARBON DIOXIDE: 25 MMOL/L (ref 21–31)
CHLORIDE: 110 MMOL/L (ref 97–110)
CREATININE: 0.46 MG/DL (ref 0.44–1)
DIGOXIN: 0.4 NG/ML (ref 1–2)
EOSINOPHILS #: 0 10^3/UL (ref 0–0.5)
EOSINOPHILS %: 0 % (ref 0–7)
GLOBULIN: 2.4 G/DL (ref 1.3–3.2)
GLUCOSE: 123 MG/DL (ref 70–220)
HEMATOCRIT: 40.6 % (ref 37–47)
HEMOGLOBIN: 13.1 G/DL (ref 12–16)
IMMATURE GRANS #M: 0.09 10^3/UL (ref 0–0.03)
IMMATURE GRANS % (M): 0.5 % (ref 0–0.43)
INR: 1.64
INR: 2.54
LYMPHOCYTES #: 1 10^3/UL (ref 0.8–2.9)
LYMPHOCYTES %: 5.4 % (ref 15–51)
MAGNESIUM: 2 MG/DL (ref 1.7–2.5)
MEAN CORPUSCULAR HEMOGLOBIN: 28.4 PG (ref 29–33)
MEAN CORPUSCULAR HGB CONC: 32.3 G/DL (ref 32–37)
MEAN CORPUSCULAR VOLUME: 87.9 FL (ref 82–101)
MEAN PLATELET VOLUME: 10.5 FL (ref 7.4–10.4)
MONOCYTE #: 1.3 10^3/UL (ref 0.3–0.9)
MONOCYTES %: 6.7 % (ref 0–11)
NEUTROPHIL #: 16.5 10^3/UL (ref 1.6–7.5)
NEUTROPHILS %: 87.2 % (ref 39–77)
NUCLEATED RED BLOOD CELLS #: 0 10^3/UL (ref 0–0)
NUCLEATED RED BLOOD CELLS%: 0 /100WBC (ref 0–0)
PARTIAL THROMBOPLASTIN TIME: 34.1 SEC (ref 23–35)
PLATELET COUNT: 235 10^3/UL (ref 140–415)
POTASSIUM: 3.9 MMOL/L (ref 3.5–5.1)
PROTIME: 19.5 SEC (ref 11.9–14.9)
PROTIME: 27.4 SEC (ref 11.9–14.9)
PT RATIO: 1.5
PT RATIO: 2.1
RED BLOOD COUNT: 4.62 10^6/UL (ref 4.2–5.4)
RED CELL DISTRIBUTION WIDTH: 14.3 % (ref 11.5–14.5)
SODIUM: 138 MMOL/L (ref 135–144)
TOTAL PROTEIN: 5.8 G/DL (ref 6.1–8.1)
WHITE BLOOD COUNT: 18.9 10^3/UL (ref 4.8–10.8)

## 2019-05-19 RX ADMIN — PHYTONADIONE 1 MLS/HR: 10 INJECTION, EMULSION INTRAMUSCULAR; INTRAVENOUS; SUBCUTANEOUS at 23:35

## 2019-05-19 RX ADMIN — DEXAMETHASONE SODIUM PHOSPHATE PRN MG: 10 INJECTION, SOLUTION INTRAMUSCULAR; INTRAVENOUS at 10:45

## 2019-05-19 RX ADMIN — MEROPENEM SCH MLS/HR: 1 INJECTION, POWDER, FOR SOLUTION INTRAVENOUS at 13:42

## 2019-05-19 RX ADMIN — MORPHINE SULFATE PRN MG: 2 INJECTION, SOLUTION INTRAMUSCULAR; INTRAVENOUS at 09:07

## 2019-05-19 RX ADMIN — DIGOXIN 1 MCG: 0.25 INJECTION INTRAMUSCULAR; INTRAVENOUS at 12:38

## 2019-05-19 RX ADMIN — LEVALBUTEROL HYDROCHLORIDE 1 MG: 0.63 SOLUTION RESPIRATORY (INHALATION) at 16:42

## 2019-05-19 RX ADMIN — MEROPENEM 1 MLS/HR: 1 INJECTION, POWDER, FOR SOLUTION INTRAVENOUS at 13:42

## 2019-05-19 RX ADMIN — FOLIC ACID SCH MLS/HR: 5 INJECTION, SOLUTION INTRAMUSCULAR; INTRAVENOUS; SUBCUTANEOUS at 10:36

## 2019-05-19 RX ADMIN — ONDANSETRON HYDROCHLORIDE 1 MG: 2 INJECTION, SOLUTION INTRAMUSCULAR; INTRAVENOUS at 04:34

## 2019-05-19 RX ADMIN — MOMETASONE FUROATE SCH PUFF: 220 INHALANT RESPIRATORY (INHALATION) at 21:47

## 2019-05-19 RX ADMIN — MEROPENEM 1 MLS/HR: 1 INJECTION, POWDER, FOR SOLUTION INTRAVENOUS at 21:58

## 2019-05-19 RX ADMIN — MORPHINE SULFATE PRN MG: 2 INJECTION, SOLUTION INTRAMUSCULAR; INTRAVENOUS at 01:52

## 2019-05-19 RX ADMIN — DIPHENHYDRAMINE HYDROCHLORIDE SCH MG: 50 INJECTION, SOLUTION INTRAMUSCULAR; INTRAVENOUS at 21:53

## 2019-05-19 RX ADMIN — THIAMINE HYDROCHLORIDE 1 MLS/HR: 100 INJECTION, SOLUTION INTRAMUSCULAR; INTRAVENOUS at 10:36

## 2019-05-19 RX ADMIN — MEROPENEM SCH MLS/HR: 1 INJECTION, POWDER, FOR SOLUTION INTRAVENOUS at 05:48

## 2019-05-19 RX ADMIN — FOLIC ACID SCH MLS/HR: 5 INJECTION, SOLUTION INTRAMUSCULAR; INTRAVENOUS; SUBCUTANEOUS at 17:36

## 2019-05-19 RX ADMIN — ONDANSETRON HYDROCHLORIDE 1 MG: 2 INJECTION, SOLUTION INTRAMUSCULAR; INTRAVENOUS at 10:45

## 2019-05-19 RX ADMIN — MEROPENEM 1 MLS/HR: 1 INJECTION, POWDER, FOR SOLUTION INTRAVENOUS at 05:48

## 2019-05-19 RX ADMIN — CYCLOSPORINE 1 DROP: 0.5 EMULSION OPHTHALMIC at 21:00

## 2019-05-19 RX ADMIN — MORPHINE SULFATE PRN MG: 2 INJECTION, SOLUTION INTRAMUSCULAR; INTRAVENOUS at 18:22

## 2019-05-19 RX ADMIN — HYDRALAZINE HYDROCHLORIDE 1 MG: 20 INJECTION INTRAMUSCULAR; INTRAVENOUS at 02:22

## 2019-05-19 RX ADMIN — THIAMINE HYDROCHLORIDE 1 MLS/HR: 100 INJECTION, SOLUTION INTRAMUSCULAR; INTRAVENOUS at 17:36

## 2019-05-19 RX ADMIN — MORPHINE SULFATE 1 MG: 2 INJECTION, SOLUTION INTRAMUSCULAR; INTRAVENOUS at 18:22

## 2019-05-19 RX ADMIN — HYDRALAZINE HYDROCHLORIDE PRN MG: 20 INJECTION INTRAMUSCULAR; INTRAVENOUS at 21:54

## 2019-05-19 RX ADMIN — MORPHINE SULFATE PRN MG: 2 INJECTION, SOLUTION INTRAMUSCULAR; INTRAVENOUS at 23:16

## 2019-05-19 RX ADMIN — FAMOTIDINE 1 MG: 10 INJECTION, SOLUTION INTRAVENOUS at 09:07

## 2019-05-19 RX ADMIN — MORPHINE SULFATE 1 MG: 2 INJECTION, SOLUTION INTRAMUSCULAR; INTRAVENOUS at 09:07

## 2019-05-19 RX ADMIN — MORPHINE SULFATE 1 MG: 2 INJECTION, SOLUTION INTRAMUSCULAR; INTRAVENOUS at 23:16

## 2019-05-19 RX ADMIN — DILTIAZEM HYDROCHLORIDE 1 MG: 5 INJECTION INTRAVENOUS at 05:48

## 2019-05-19 RX ADMIN — CYCLOSPORINE 1 DROP: 0.5 EMULSION OPHTHALMIC at 09:00

## 2019-05-19 RX ADMIN — DILTIAZEM HYDROCHLORIDE SCH MG: 5 INJECTION INTRAVENOUS at 18:12

## 2019-05-19 RX ADMIN — MEROPENEM SCH MLS/HR: 1 INJECTION, POWDER, FOR SOLUTION INTRAVENOUS at 21:58

## 2019-05-19 RX ADMIN — DILTIAZEM HYDROCHLORIDE SCH MG: 5 INJECTION INTRAVENOUS at 23:47

## 2019-05-19 RX ADMIN — PHYTONADIONE 1 MLS/HR: 10 INJECTION, EMULSION INTRAMUSCULAR; INTRAVENOUS; SUBCUTANEOUS at 13:07

## 2019-05-19 RX ADMIN — DIGOXIN SCH MCG: 0.25 INJECTION INTRAMUSCULAR; INTRAVENOUS at 12:38

## 2019-05-19 RX ADMIN — LEVALBUTEROL HYDROCHLORIDE PRN MG: 0.63 SOLUTION RESPIRATORY (INHALATION) at 16:42

## 2019-05-19 RX ADMIN — MORPHINE SULFATE 1 MG: 2 INJECTION, SOLUTION INTRAMUSCULAR; INTRAVENOUS at 01:52

## 2019-05-19 RX ADMIN — DILTIAZEM HYDROCHLORIDE SCH MG: 5 INJECTION INTRAVENOUS at 05:48

## 2019-05-19 RX ADMIN — DILTIAZEM HYDROCHLORIDE 1 MG: 5 INJECTION INTRAVENOUS at 18:12

## 2019-05-19 RX ADMIN — DILTIAZEM HYDROCHLORIDE 1 MG: 5 INJECTION INTRAVENOUS at 23:47

## 2019-05-19 RX ADMIN — FAMOTIDINE 1 MG: 10 INJECTION, SOLUTION INTRAVENOUS at 21:53

## 2019-05-19 RX ADMIN — MOMETASONE FUROATE 1 PUFF: 220 INHALANT RESPIRATORY (INHALATION) at 21:47

## 2019-05-19 RX ADMIN — LORAZEPAM 1 MG: 2 INJECTION, SOLUTION INTRAMUSCULAR; INTRAVENOUS at 18:30

## 2019-05-19 RX ADMIN — DIPHENHYDRAMINE HYDROCHLORIDE SCH MG: 50 INJECTION, SOLUTION INTRAMUSCULAR; INTRAVENOUS at 09:07

## 2019-05-19 RX ADMIN — DEXAMETHASONE SODIUM PHOSPHATE PRN MG: 10 INJECTION, SOLUTION INTRAMUSCULAR; INTRAVENOUS at 04:34

## 2019-05-19 RX ADMIN — HYDRALAZINE HYDROCHLORIDE 1 MG: 20 INJECTION INTRAMUSCULAR; INTRAVENOUS at 21:54

## 2019-05-20 VITALS — DIASTOLIC BLOOD PRESSURE: 74 MMHG | RESPIRATION RATE: 18 BRPM | HEART RATE: 82 BPM | SYSTOLIC BLOOD PRESSURE: 172 MMHG

## 2019-05-20 VITALS — RESPIRATION RATE: 18 BRPM | SYSTOLIC BLOOD PRESSURE: 153 MMHG | DIASTOLIC BLOOD PRESSURE: 68 MMHG | HEART RATE: 97 BPM

## 2019-05-20 VITALS — SYSTOLIC BLOOD PRESSURE: 158 MMHG | RESPIRATION RATE: 20 BRPM | DIASTOLIC BLOOD PRESSURE: 65 MMHG | HEART RATE: 68 BPM

## 2019-05-20 VITALS — SYSTOLIC BLOOD PRESSURE: 178 MMHG | RESPIRATION RATE: 20 BRPM | DIASTOLIC BLOOD PRESSURE: 84 MMHG | HEART RATE: 83 BPM

## 2019-05-20 VITALS — HEART RATE: 81 BPM | DIASTOLIC BLOOD PRESSURE: 73 MMHG | RESPIRATION RATE: 18 BRPM | SYSTOLIC BLOOD PRESSURE: 178 MMHG

## 2019-05-20 VITALS — RESPIRATION RATE: 20 BRPM | SYSTOLIC BLOOD PRESSURE: 164 MMHG | HEART RATE: 78 BPM | DIASTOLIC BLOOD PRESSURE: 74 MMHG

## 2019-05-20 VITALS — HEART RATE: 79 BPM

## 2019-05-20 VITALS — SYSTOLIC BLOOD PRESSURE: 170 MMHG | RESPIRATION RATE: 20 BRPM | HEART RATE: 75 BPM | DIASTOLIC BLOOD PRESSURE: 56 MMHG

## 2019-05-20 VITALS — HEART RATE: 72 BPM

## 2019-05-20 VITALS — HEART RATE: 82 BPM

## 2019-05-20 VITALS — HEART RATE: 80 BPM

## 2019-05-20 VITALS — HEART RATE: 84 BPM

## 2019-05-20 LAB
ADD MAN DIFF?: NO
ALANINE AMINOTRANSFERASE: 22 IU/L (ref 13–69)
ALBUMIN/GLOBULIN RATIO: 1.23
ALBUMIN: 3.2 G/DL (ref 3.3–4.9)
ALKALINE PHOSPHATASE: 76 IU/L (ref 42–121)
ANION GAP: 6 (ref 5–13)
ASPARTATE AMINO TRANSFERASE: 15 IU/L (ref 15–46)
BASOPHIL #: 0.1 10^3/UL (ref 0–0.1)
BASOPHILS %: 0.6 % (ref 0–2)
BILIRUBIN,DIRECT: 0 MG/DL (ref 0–0.2)
BILIRUBIN,TOTAL: 0.7 MG/DL (ref 0.2–1.3)
BLOOD UREA NITROGEN: 8 MG/DL (ref 7–20)
CALCIUM: 9.2 MG/DL (ref 8.4–10.2)
CARBON DIOXIDE: 25 MMOL/L (ref 21–31)
CHLORIDE: 108 MMOL/L (ref 97–110)
CREATININE: 0.43 MG/DL (ref 0.44–1)
EOSINOPHILS #: 0 10^3/UL (ref 0–0.5)
EOSINOPHILS %: 0.2 % (ref 0–7)
GLOBULIN: 2.6 G/DL (ref 1.3–3.2)
GLUCOSE: 94 MG/DL (ref 70–220)
HEMATOCRIT: 38.3 % (ref 37–47)
HEMOGLOBIN: 12.3 G/DL (ref 12–16)
IMMATURE GRANS #M: 0.06 10^3/UL (ref 0–0.03)
IMMATURE GRANS % (M): 0.5 % (ref 0–0.43)
INR: 1.14
LYMPHOCYTES #: 1.5 10^3/UL (ref 0.8–2.9)
LYMPHOCYTES %: 13.2 % (ref 15–51)
MAGNESIUM: 1.9 MG/DL (ref 1.7–2.5)
MEAN CORPUSCULAR HEMOGLOBIN: 28.7 PG (ref 29–33)
MEAN CORPUSCULAR HGB CONC: 32.1 G/DL (ref 32–37)
MEAN CORPUSCULAR VOLUME: 89.3 FL (ref 82–101)
MEAN PLATELET VOLUME: 10.6 FL (ref 7.4–10.4)
MONOCYTE #: 1.2 10^3/UL (ref 0.3–0.9)
MONOCYTES %: 10.3 % (ref 0–11)
NEUTROPHIL #: 8.5 10^3/UL (ref 1.6–7.5)
NEUTROPHILS %: 75.2 % (ref 39–77)
NUCLEATED RED BLOOD CELLS #: 0 10^3/UL (ref 0–0)
NUCLEATED RED BLOOD CELLS%: 0 /100WBC (ref 0–0)
PARTIAL THROMBOPLASTIN TIME: 29.1 SEC (ref 23–35)
PLATELET COUNT: 178 10^3/UL (ref 140–415)
POTASSIUM: 3.7 MMOL/L (ref 3.5–5.1)
PROTIME: 14.7 SEC (ref 11.9–14.9)
PT RATIO: 1.1
RED BLOOD COUNT: 4.29 10^6/UL (ref 4.2–5.4)
RED CELL DISTRIBUTION WIDTH: 14.5 % (ref 11.5–14.5)
SODIUM: 139 MMOL/L (ref 135–144)
TOTAL PROTEIN: 5.8 G/DL (ref 6.1–8.1)
WHITE BLOOD COUNT: 11.2 10^3/UL (ref 4.8–10.8)

## 2019-05-20 RX ADMIN — FAMOTIDINE 1 MG: 10 INJECTION, SOLUTION INTRAVENOUS at 08:30

## 2019-05-20 RX ADMIN — FOLIC ACID SCH MLS/HR: 5 INJECTION, SOLUTION INTRAMUSCULAR; INTRAVENOUS; SUBCUTANEOUS at 23:45

## 2019-05-20 RX ADMIN — DILTIAZEM HYDROCHLORIDE SCH MG: 5 INJECTION INTRAVENOUS at 05:21

## 2019-05-20 RX ADMIN — DILTIAZEM HYDROCHLORIDE SCH MG: 5 INJECTION INTRAVENOUS at 23:35

## 2019-05-20 RX ADMIN — THIAMINE HYDROCHLORIDE 1 MLS/HR: 100 INJECTION, SOLUTION INTRAMUSCULAR; INTRAVENOUS at 23:45

## 2019-05-20 RX ADMIN — DILTIAZEM HYDROCHLORIDE SCH MG: 5 INJECTION INTRAVENOUS at 11:44

## 2019-05-20 RX ADMIN — HYDRALAZINE HYDROCHLORIDE PRN MG: 20 INJECTION INTRAMUSCULAR; INTRAVENOUS at 21:31

## 2019-05-20 RX ADMIN — ONDANSETRON HYDROCHLORIDE 1 MG: 2 INJECTION, SOLUTION INTRAMUSCULAR; INTRAVENOUS at 03:53

## 2019-05-20 RX ADMIN — MORPHINE SULFATE PRN MG: 2 INJECTION, SOLUTION INTRAMUSCULAR; INTRAVENOUS at 21:32

## 2019-05-20 RX ADMIN — MORPHINE SULFATE 1 MG: 2 INJECTION, SOLUTION INTRAMUSCULAR; INTRAVENOUS at 21:32

## 2019-05-20 RX ADMIN — DEXAMETHASONE SODIUM PHOSPHATE PRN MG: 10 INJECTION, SOLUTION INTRAMUSCULAR; INTRAVENOUS at 03:53

## 2019-05-20 RX ADMIN — MEROPENEM 1 MLS/HR: 1 INJECTION, POWDER, FOR SOLUTION INTRAVENOUS at 05:21

## 2019-05-20 RX ADMIN — DILTIAZEM HYDROCHLORIDE 1 MG: 5 INJECTION INTRAVENOUS at 05:21

## 2019-05-20 RX ADMIN — DILTIAZEM HYDROCHLORIDE 1 MG: 5 INJECTION INTRAVENOUS at 23:35

## 2019-05-20 RX ADMIN — MORPHINE SULFATE 1 MG: 2 INJECTION, SOLUTION INTRAMUSCULAR; INTRAVENOUS at 13:07

## 2019-05-20 RX ADMIN — MEROPENEM 1 MLS/HR: 1 INJECTION, POWDER, FOR SOLUTION INTRAVENOUS at 21:30

## 2019-05-20 RX ADMIN — FOLIC ACID SCH MLS/HR: 5 INJECTION, SOLUTION INTRAMUSCULAR; INTRAVENOUS; SUBCUTANEOUS at 18:18

## 2019-05-20 RX ADMIN — DIGOXIN SCH MCG: 0.25 INJECTION INTRAMUSCULAR; INTRAVENOUS at 12:53

## 2019-05-20 RX ADMIN — THIAMINE HYDROCHLORIDE 1 MLS/HR: 100 INJECTION, SOLUTION INTRAMUSCULAR; INTRAVENOUS at 03:58

## 2019-05-20 RX ADMIN — MEROPENEM SCH MLS/HR: 1 INJECTION, POWDER, FOR SOLUTION INTRAVENOUS at 05:21

## 2019-05-20 RX ADMIN — DIPHENHYDRAMINE HYDROCHLORIDE SCH MG: 50 INJECTION, SOLUTION INTRAMUSCULAR; INTRAVENOUS at 08:30

## 2019-05-20 RX ADMIN — MOMETASONE FUROATE 1 PUFF: 220 INHALANT RESPIRATORY (INHALATION) at 08:27

## 2019-05-20 RX ADMIN — CYCLOSPORINE 1 DROP: 0.5 EMULSION OPHTHALMIC at 08:29

## 2019-05-20 RX ADMIN — FAMOTIDINE 1 MG: 10 INJECTION, SOLUTION INTRAVENOUS at 21:31

## 2019-05-20 RX ADMIN — LORAZEPAM 1 MG: 2 INJECTION, SOLUTION INTRAMUSCULAR; INTRAVENOUS at 03:54

## 2019-05-20 RX ADMIN — CYCLOSPORINE 1 DROP: 0.5 EMULSION OPHTHALMIC at 21:00

## 2019-05-20 RX ADMIN — FOLIC ACID SCH MLS/HR: 5 INJECTION, SOLUTION INTRAMUSCULAR; INTRAVENOUS; SUBCUTANEOUS at 03:58

## 2019-05-20 RX ADMIN — DILTIAZEM HYDROCHLORIDE 1 MG: 5 INJECTION INTRAVENOUS at 17:47

## 2019-05-20 RX ADMIN — DIGOXIN 1 MCG: 0.25 INJECTION INTRAMUSCULAR; INTRAVENOUS at 12:53

## 2019-05-20 RX ADMIN — DIPHENHYDRAMINE HYDROCHLORIDE SCH MG: 50 INJECTION, SOLUTION INTRAMUSCULAR; INTRAVENOUS at 21:31

## 2019-05-20 RX ADMIN — MEROPENEM SCH MLS/HR: 1 INJECTION, POWDER, FOR SOLUTION INTRAVENOUS at 21:30

## 2019-05-20 RX ADMIN — MEROPENEM 1 MLS/HR: 1 INJECTION, POWDER, FOR SOLUTION INTRAVENOUS at 13:09

## 2019-05-20 RX ADMIN — DILTIAZEM HYDROCHLORIDE 1 MG: 5 INJECTION INTRAVENOUS at 12:57

## 2019-05-20 RX ADMIN — MOMETASONE FUROATE SCH PUFF: 220 INHALANT RESPIRATORY (INHALATION) at 08:27

## 2019-05-20 RX ADMIN — MORPHINE SULFATE PRN MG: 2 INJECTION, SOLUTION INTRAMUSCULAR; INTRAVENOUS at 13:07

## 2019-05-20 RX ADMIN — MEROPENEM SCH MLS/HR: 1 INJECTION, POWDER, FOR SOLUTION INTRAVENOUS at 13:09

## 2019-05-20 RX ADMIN — MOMETASONE FUROATE SCH PUFF: 220 INHALANT RESPIRATORY (INHALATION) at 20:00

## 2019-05-20 RX ADMIN — HYDRALAZINE HYDROCHLORIDE 1 MG: 20 INJECTION INTRAMUSCULAR; INTRAVENOUS at 21:31

## 2019-05-20 RX ADMIN — THIAMINE HYDROCHLORIDE 1 MLS/HR: 100 INJECTION, SOLUTION INTRAMUSCULAR; INTRAVENOUS at 18:18

## 2019-05-20 RX ADMIN — DILTIAZEM HYDROCHLORIDE SCH MG: 5 INJECTION INTRAVENOUS at 17:47

## 2019-05-20 RX ADMIN — MAGNESIUM SULFATE HEPTAHYDRATE 1 MLS/HR: 40 INJECTION, SOLUTION INTRAVENOUS at 15:57

## 2019-05-20 RX ADMIN — MOMETASONE FUROATE 1 PUFF: 220 INHALANT RESPIRATORY (INHALATION) at 20:00

## 2019-05-20 RX ADMIN — DILTIAZEM HYDROCHLORIDE 1 MG: 5 INJECTION INTRAVENOUS at 11:44

## 2019-05-20 RX ADMIN — POTASSIUM CHLORIDE 1 MLS/HR: 200 INJECTION, SOLUTION INTRAVENOUS at 14:01

## 2019-05-21 VITALS — DIASTOLIC BLOOD PRESSURE: 62 MMHG | RESPIRATION RATE: 21 BRPM | HEART RATE: 80 BPM | SYSTOLIC BLOOD PRESSURE: 139 MMHG

## 2019-05-21 VITALS — HEART RATE: 76 BPM | SYSTOLIC BLOOD PRESSURE: 172 MMHG | DIASTOLIC BLOOD PRESSURE: 71 MMHG | RESPIRATION RATE: 22 BRPM

## 2019-05-21 VITALS — RESPIRATION RATE: 20 BRPM | SYSTOLIC BLOOD PRESSURE: 159 MMHG | DIASTOLIC BLOOD PRESSURE: 70 MMHG | HEART RATE: 76 BPM

## 2019-05-21 VITALS — SYSTOLIC BLOOD PRESSURE: 140 MMHG | RESPIRATION RATE: 22 BRPM | HEART RATE: 88 BPM | DIASTOLIC BLOOD PRESSURE: 61 MMHG

## 2019-05-21 VITALS — RESPIRATION RATE: 20 BRPM | HEART RATE: 80 BPM | DIASTOLIC BLOOD PRESSURE: 63 MMHG | SYSTOLIC BLOOD PRESSURE: 140 MMHG

## 2019-05-21 VITALS — SYSTOLIC BLOOD PRESSURE: 144 MMHG | DIASTOLIC BLOOD PRESSURE: 60 MMHG | RESPIRATION RATE: 19 BRPM | HEART RATE: 88 BPM

## 2019-05-21 VITALS — RESPIRATION RATE: 23 BRPM | SYSTOLIC BLOOD PRESSURE: 152 MMHG | HEART RATE: 88 BPM | DIASTOLIC BLOOD PRESSURE: 65 MMHG

## 2019-05-21 VITALS — DIASTOLIC BLOOD PRESSURE: 60 MMHG | SYSTOLIC BLOOD PRESSURE: 147 MMHG | HEART RATE: 80 BPM | RESPIRATION RATE: 19 BRPM

## 2019-05-21 VITALS — DIASTOLIC BLOOD PRESSURE: 62 MMHG | HEART RATE: 82 BPM | SYSTOLIC BLOOD PRESSURE: 142 MMHG | RESPIRATION RATE: 15 BRPM

## 2019-05-21 VITALS — HEART RATE: 80 BPM

## 2019-05-21 VITALS — DIASTOLIC BLOOD PRESSURE: 55 MMHG | HEART RATE: 78 BPM | SYSTOLIC BLOOD PRESSURE: 138 MMHG | RESPIRATION RATE: 20 BRPM

## 2019-05-21 VITALS — RESPIRATION RATE: 20 BRPM | DIASTOLIC BLOOD PRESSURE: 68 MMHG | HEART RATE: 82 BPM | SYSTOLIC BLOOD PRESSURE: 145 MMHG

## 2019-05-21 VITALS — SYSTOLIC BLOOD PRESSURE: 136 MMHG | DIASTOLIC BLOOD PRESSURE: 58 MMHG | RESPIRATION RATE: 20 BRPM | HEART RATE: 80 BPM

## 2019-05-21 VITALS — SYSTOLIC BLOOD PRESSURE: 146 MMHG | RESPIRATION RATE: 18 BRPM | DIASTOLIC BLOOD PRESSURE: 65 MMHG | HEART RATE: 84 BPM

## 2019-05-21 VITALS — SYSTOLIC BLOOD PRESSURE: 142 MMHG | DIASTOLIC BLOOD PRESSURE: 66 MMHG | HEART RATE: 78 BPM | RESPIRATION RATE: 20 BRPM

## 2019-05-21 VITALS — DIASTOLIC BLOOD PRESSURE: 65 MMHG | RESPIRATION RATE: 20 BRPM | HEART RATE: 80 BPM | SYSTOLIC BLOOD PRESSURE: 140 MMHG

## 2019-05-21 VITALS — HEART RATE: 84 BPM

## 2019-05-21 VITALS — HEART RATE: 95 BPM

## 2019-05-21 VITALS — RESPIRATION RATE: 20 BRPM | DIASTOLIC BLOOD PRESSURE: 60 MMHG | SYSTOLIC BLOOD PRESSURE: 141 MMHG | HEART RATE: 80 BPM

## 2019-05-21 VITALS — SYSTOLIC BLOOD PRESSURE: 138 MMHG | HEART RATE: 82 BPM | RESPIRATION RATE: 21 BRPM | DIASTOLIC BLOOD PRESSURE: 60 MMHG

## 2019-05-21 VITALS — DIASTOLIC BLOOD PRESSURE: 65 MMHG | SYSTOLIC BLOOD PRESSURE: 143 MMHG

## 2019-05-21 VITALS — HEART RATE: 82 BPM

## 2019-05-21 VITALS — RESPIRATION RATE: 22 BRPM | HEART RATE: 78 BPM | DIASTOLIC BLOOD PRESSURE: 66 MMHG | SYSTOLIC BLOOD PRESSURE: 145 MMHG

## 2019-05-21 LAB
ADD MAN DIFF?: NO
ANION GAP: 8 (ref 5–13)
BASOPHIL #: 0.1 10^3/UL (ref 0–0.1)
BASOPHILS %: 0.5 % (ref 0–2)
BLOOD UREA NITROGEN: 8 MG/DL (ref 7–20)
CALCIUM: 9.2 MG/DL (ref 8.4–10.2)
CARBON DIOXIDE: 24 MMOL/L (ref 21–31)
CHLORIDE: 105 MMOL/L (ref 97–110)
CREATININE: 0.39 MG/DL (ref 0.44–1)
EOSINOPHILS #: 0.2 10^3/UL (ref 0–0.5)
EOSINOPHILS %: 1.7 % (ref 0–7)
GLUCOSE: 80 MG/DL (ref 70–220)
HEMATOCRIT: 41.1 % (ref 37–47)
HEMOGLOBIN: 13.4 G/DL (ref 12–16)
IMMATURE GRANS #M: 0.06 10^3/UL (ref 0–0.03)
IMMATURE GRANS % (M): 0.5 % (ref 0–0.43)
INR: 1.03
LYMPHOCYTES #: 1.7 10^3/UL (ref 0.8–2.9)
LYMPHOCYTES %: 15.3 % (ref 15–51)
MAGNESIUM: 2.3 MG/DL (ref 1.7–2.5)
MEAN CORPUSCULAR HEMOGLOBIN: 28.6 PG (ref 29–33)
MEAN CORPUSCULAR HGB CONC: 32.6 G/DL (ref 32–37)
MEAN CORPUSCULAR VOLUME: 87.8 FL (ref 82–101)
MEAN PLATELET VOLUME: 10.3 FL (ref 7.4–10.4)
MONOCYTE #: 1.1 10^3/UL (ref 0.3–0.9)
MONOCYTES %: 9.8 % (ref 0–11)
NEUTROPHIL #: 7.9 10^3/UL (ref 1.6–7.5)
NEUTROPHILS %: 72.2 % (ref 39–77)
NUCLEATED RED BLOOD CELLS #: 0 10^3/UL (ref 0–0)
NUCLEATED RED BLOOD CELLS%: 0 /100WBC (ref 0–0)
PARTIAL THROMBOPLASTIN TIME: 29.5 SEC (ref 23–35)
PLATELET COUNT: 184 10^3/UL (ref 140–415)
POTASSIUM: 4.3 MMOL/L (ref 3.5–5.1)
PROTIME: 13.6 SEC (ref 11.9–14.9)
PT RATIO: 1.1
RED BLOOD COUNT: 4.68 10^6/UL (ref 4.2–5.4)
RED CELL DISTRIBUTION WIDTH: 14.3 % (ref 11.5–14.5)
SODIUM: 137 MMOL/L (ref 135–144)
WHITE BLOOD COUNT: 11 10^3/UL (ref 4.8–10.8)

## 2019-05-21 PROCEDURE — 0YU60JZ SUPPLEMENT LEFT INGUINAL REGION WITH SYNTHETIC SUBSTITUTE, OPEN APPROACH: ICD-10-PCS

## 2019-05-21 RX ADMIN — FOLIC ACID SCH MLS/HR: 5 INJECTION, SOLUTION INTRAMUSCULAR; INTRAVENOUS; SUBCUTANEOUS at 05:20

## 2019-05-21 RX ADMIN — MEROPENEM SCH MLS/HR: 1 INJECTION, POWDER, FOR SOLUTION INTRAVENOUS at 05:26

## 2019-05-21 RX ADMIN — CYCLOSPORINE 1 DROP: 0.5 EMULSION OPHTHALMIC at 20:08

## 2019-05-21 RX ADMIN — MEROPENEM 1 MLS/HR: 1 INJECTION, POWDER, FOR SOLUTION INTRAVENOUS at 23:43

## 2019-05-21 RX ADMIN — MEROPENEM SCH MLS/HR: 1 INJECTION, POWDER, FOR SOLUTION INTRAVENOUS at 23:43

## 2019-05-21 RX ADMIN — DIGOXIN 1 MCG: 0.25 INJECTION INTRAMUSCULAR; INTRAVENOUS at 12:28

## 2019-05-21 RX ADMIN — MEROPENEM 1 MLS/HR: 1 INJECTION, POWDER, FOR SOLUTION INTRAVENOUS at 05:26

## 2019-05-21 RX ADMIN — DIGOXIN SCH MCG: 0.25 INJECTION INTRAMUSCULAR; INTRAVENOUS at 12:28

## 2019-05-21 RX ADMIN — DIPHENHYDRAMINE HYDROCHLORIDE SCH MG: 50 INJECTION, SOLUTION INTRAMUSCULAR; INTRAVENOUS at 20:08

## 2019-05-21 RX ADMIN — DIPHENHYDRAMINE HYDROCHLORIDE SCH MG: 50 INJECTION, SOLUTION INTRAMUSCULAR; INTRAVENOUS at 08:37

## 2019-05-21 RX ADMIN — DILTIAZEM HYDROCHLORIDE 1 MG: 5 INJECTION INTRAVENOUS at 12:30

## 2019-05-21 RX ADMIN — MEROPENEM SCH MLS/HR: 1 INJECTION, POWDER, FOR SOLUTION INTRAVENOUS at 13:07

## 2019-05-21 RX ADMIN — DILTIAZEM HYDROCHLORIDE SCH MG: 5 INJECTION INTRAVENOUS at 05:25

## 2019-05-21 RX ADMIN — FAMOTIDINE 1 MG: 10 INJECTION, SOLUTION INTRAVENOUS at 20:08

## 2019-05-21 RX ADMIN — DILTIAZEM HYDROCHLORIDE SCH MG: 5 INJECTION INTRAVENOUS at 18:00

## 2019-05-21 RX ADMIN — FAMOTIDINE 1 MG: 10 INJECTION, SOLUTION INTRAVENOUS at 08:37

## 2019-05-21 RX ADMIN — MEROPENEM 1 MLS/HR: 1 INJECTION, POWDER, FOR SOLUTION INTRAVENOUS at 13:07

## 2019-05-21 RX ADMIN — DILTIAZEM HYDROCHLORIDE 1 MG: 5 INJECTION INTRAVENOUS at 05:25

## 2019-05-21 RX ADMIN — THIAMINE HYDROCHLORIDE 1 MLS/HR: 100 INJECTION, SOLUTION INTRAMUSCULAR; INTRAVENOUS at 05:20

## 2019-05-21 RX ADMIN — FOLIC ACID SCH MLS/HR: 5 INJECTION, SOLUTION INTRAMUSCULAR; INTRAVENOUS; SUBCUTANEOUS at 20:09

## 2019-05-21 RX ADMIN — MOMETASONE FUROATE SCH PUFF: 220 INHALANT RESPIRATORY (INHALATION) at 20:08

## 2019-05-21 RX ADMIN — DILTIAZEM HYDROCHLORIDE SCH MG: 5 INJECTION INTRAVENOUS at 12:30

## 2019-05-21 RX ADMIN — MOMETASONE FUROATE 1 PUFF: 220 INHALANT RESPIRATORY (INHALATION) at 20:08

## 2019-05-21 RX ADMIN — DILTIAZEM HYDROCHLORIDE 1 MG: 5 INJECTION INTRAVENOUS at 18:00

## 2019-05-21 RX ADMIN — CYCLOSPORINE 1 DROP: 0.5 EMULSION OPHTHALMIC at 08:37

## 2019-05-21 RX ADMIN — THIAMINE HYDROCHLORIDE 1 MLS/HR: 100 INJECTION, SOLUTION INTRAMUSCULAR; INTRAVENOUS at 20:09

## 2019-05-21 RX ADMIN — MOMETASONE FUROATE 1 PUFF: 220 INHALANT RESPIRATORY (INHALATION) at 08:35

## 2019-05-21 RX ADMIN — MOMETASONE FUROATE SCH PUFF: 220 INHALANT RESPIRATORY (INHALATION) at 08:35

## 2019-05-22 VITALS — SYSTOLIC BLOOD PRESSURE: 127 MMHG | RESPIRATION RATE: 18 BRPM | HEART RATE: 76 BPM | DIASTOLIC BLOOD PRESSURE: 60 MMHG

## 2019-05-22 VITALS — RESPIRATION RATE: 22 BRPM | DIASTOLIC BLOOD PRESSURE: 55 MMHG | HEART RATE: 80 BPM | SYSTOLIC BLOOD PRESSURE: 112 MMHG

## 2019-05-22 VITALS — HEART RATE: 94 BPM

## 2019-05-22 VITALS — HEART RATE: 82 BPM | SYSTOLIC BLOOD PRESSURE: 118 MMHG | DIASTOLIC BLOOD PRESSURE: 68 MMHG | RESPIRATION RATE: 22 BRPM

## 2019-05-22 VITALS — HEART RATE: 90 BPM | RESPIRATION RATE: 18 BRPM | DIASTOLIC BLOOD PRESSURE: 66 MMHG | SYSTOLIC BLOOD PRESSURE: 129 MMHG

## 2019-05-22 VITALS — HEART RATE: 80 BPM | RESPIRATION RATE: 22 BRPM | SYSTOLIC BLOOD PRESSURE: 112 MMHG | DIASTOLIC BLOOD PRESSURE: 55 MMHG

## 2019-05-22 VITALS — SYSTOLIC BLOOD PRESSURE: 138 MMHG | HEART RATE: 78 BPM | DIASTOLIC BLOOD PRESSURE: 63 MMHG | RESPIRATION RATE: 20 BRPM

## 2019-05-22 VITALS — HEART RATE: 88 BPM

## 2019-05-22 LAB
ADD MAN DIFF?: NO
ALANINE AMINOTRANSFERASE: 19 IU/L (ref 13–69)
ALBUMIN/GLOBULIN RATIO: 1.14
ALBUMIN: 3.1 G/DL (ref 3.3–4.9)
ALKALINE PHOSPHATASE: 63 IU/L (ref 42–121)
ANION GAP: 9 (ref 5–13)
ASPARTATE AMINO TRANSFERASE: 15 IU/L (ref 15–46)
BASOPHIL #: 0.1 10^3/UL (ref 0–0.1)
BASOPHILS %: 0.5 % (ref 0–2)
BILIRUBIN,DIRECT: 0 MG/DL (ref 0–0.2)
BILIRUBIN,TOTAL: 0.7 MG/DL (ref 0.2–1.3)
BLOOD UREA NITROGEN: 11 MG/DL (ref 7–20)
CALCIUM: 9 MG/DL (ref 8.4–10.2)
CARBON DIOXIDE: 22 MMOL/L (ref 21–31)
CHLORIDE: 105 MMOL/L (ref 97–110)
CREATININE: 0.45 MG/DL (ref 0.44–1)
EOSINOPHILS #: 0.2 10^3/UL (ref 0–0.5)
EOSINOPHILS %: 1.4 % (ref 0–7)
GLOBULIN: 2.7 G/DL (ref 1.3–3.2)
GLUCOSE: 77 MG/DL (ref 70–220)
HEMATOCRIT: 38.5 % (ref 37–47)
HEMOGLOBIN: 12.3 G/DL (ref 12–16)
IMMATURE GRANS #M: 0.1 10^3/UL (ref 0–0.03)
IMMATURE GRANS % (M): 0.8 % (ref 0–0.43)
INR: 1.09
LYMPHOCYTES #: 1 10^3/UL (ref 0.8–2.9)
LYMPHOCYTES %: 7.4 % (ref 15–51)
MAGNESIUM: 2.2 MG/DL (ref 1.7–2.5)
MEAN CORPUSCULAR HEMOGLOBIN: 28.1 PG (ref 29–33)
MEAN CORPUSCULAR HGB CONC: 31.9 G/DL (ref 32–37)
MEAN CORPUSCULAR VOLUME: 88.1 FL (ref 82–101)
MEAN PLATELET VOLUME: 10.3 FL (ref 7.4–10.4)
MONOCYTE #: 1.2 10^3/UL (ref 0.3–0.9)
MONOCYTES %: 9.4 % (ref 0–11)
NEUTROPHIL #: 10.6 10^3/UL (ref 1.6–7.5)
NEUTROPHILS %: 80.5 % (ref 39–77)
NUCLEATED RED BLOOD CELLS #: 0 10^3/UL (ref 0–0)
NUCLEATED RED BLOOD CELLS%: 0 /100WBC (ref 0–0)
PARTIAL THROMBOPLASTIN TIME: 31 SEC (ref 23–35)
PLATELET COUNT: 199 10^3/UL (ref 140–415)
POTASSIUM: 4.3 MMOL/L (ref 3.5–5.1)
PROTIME: 14.2 SEC (ref 11.9–14.9)
PT RATIO: 1.1
RED BLOOD COUNT: 4.37 10^6/UL (ref 4.2–5.4)
RED CELL DISTRIBUTION WIDTH: 13.7 % (ref 11.5–14.5)
SODIUM: 136 MMOL/L (ref 135–144)
TOTAL PROTEIN: 5.8 G/DL (ref 6.1–8.1)
WHITE BLOOD COUNT: 13.1 10^3/UL (ref 4.8–10.8)

## 2019-05-22 RX ADMIN — MEROPENEM SCH MLS/HR: 1 INJECTION, POWDER, FOR SOLUTION INTRAVENOUS at 13:23

## 2019-05-22 RX ADMIN — DIGOXIN SCH MCG: 0.25 INJECTION INTRAMUSCULAR; INTRAVENOUS at 12:58

## 2019-05-22 RX ADMIN — DILTIAZEM HYDROCHLORIDE SCH MG: 5 INJECTION INTRAVENOUS at 17:47

## 2019-05-22 RX ADMIN — MEROPENEM SCH MLS/HR: 1 INJECTION, POWDER, FOR SOLUTION INTRAVENOUS at 19:58

## 2019-05-22 RX ADMIN — SERTRALINE HYDROCHLORIDE SCH MG: 50 TABLET ORAL at 09:08

## 2019-05-22 RX ADMIN — DILTIAZEM HYDROCHLORIDE 1 MG: 5 INJECTION INTRAVENOUS at 17:47

## 2019-05-22 RX ADMIN — DIGOXIN 1 MCG: 0.25 INJECTION INTRAMUSCULAR; INTRAVENOUS at 12:58

## 2019-05-22 RX ADMIN — FAMOTIDINE 1 MG: 10 INJECTION, SOLUTION INTRAVENOUS at 10:20

## 2019-05-22 RX ADMIN — DILTIAZEM HYDROCHLORIDE SCH MG: 5 INJECTION INTRAVENOUS at 05:26

## 2019-05-22 RX ADMIN — SERTRALINE HYDROCHLORIDE 1 MG: 50 TABLET ORAL at 09:08

## 2019-05-22 RX ADMIN — ENOXAPARIN SODIUM 1 MG: 100 INJECTION SUBCUTANEOUS at 09:23

## 2019-05-22 RX ADMIN — DILTIAZEM HYDROCHLORIDE SCH MG: 5 INJECTION INTRAVENOUS at 00:28

## 2019-05-22 RX ADMIN — DIPHENHYDRAMINE HYDROCHLORIDE SCH MG: 50 INJECTION, SOLUTION INTRAMUSCULAR; INTRAVENOUS at 10:20

## 2019-05-22 RX ADMIN — WARFARIN SODIUM 1 MG: 5 TABLET ORAL at 17:38

## 2019-05-22 RX ADMIN — MEROPENEM 1 MLS/HR: 1 INJECTION, POWDER, FOR SOLUTION INTRAVENOUS at 05:26

## 2019-05-22 RX ADMIN — MEROPENEM SCH MLS/HR: 1 INJECTION, POWDER, FOR SOLUTION INTRAVENOUS at 05:26

## 2019-05-22 RX ADMIN — CYCLOSPORINE 1 DROP: 0.5 EMULSION OPHTHALMIC at 09:08

## 2019-05-22 RX ADMIN — DILTIAZEM HYDROCHLORIDE 1 MG: 5 INJECTION INTRAVENOUS at 00:28

## 2019-05-22 RX ADMIN — CYCLOSPORINE 1 DROP: 0.5 EMULSION OPHTHALMIC at 19:57

## 2019-05-22 RX ADMIN — DILTIAZEM HYDROCHLORIDE 1 MG: 5 INJECTION INTRAVENOUS at 05:26

## 2019-05-22 RX ADMIN — MOMETASONE FUROATE SCH PUFF: 220 INHALANT RESPIRATORY (INHALATION) at 19:57

## 2019-05-22 RX ADMIN — MOMETASONE FUROATE 1 PUFF: 220 INHALANT RESPIRATORY (INHALATION) at 09:00

## 2019-05-22 RX ADMIN — DILTIAZEM HYDROCHLORIDE SCH MG: 5 INJECTION INTRAVENOUS at 12:07

## 2019-05-22 RX ADMIN — MEROPENEM 1 MLS/HR: 1 INJECTION, POWDER, FOR SOLUTION INTRAVENOUS at 19:58

## 2019-05-22 RX ADMIN — MOMETASONE FUROATE 1 PUFF: 220 INHALANT RESPIRATORY (INHALATION) at 19:57

## 2019-05-22 RX ADMIN — MOMETASONE FUROATE SCH PUFF: 220 INHALANT RESPIRATORY (INHALATION) at 09:00

## 2019-05-22 RX ADMIN — DILTIAZEM HYDROCHLORIDE 1 MG: 5 INJECTION INTRAVENOUS at 12:07

## 2019-05-22 RX ADMIN — MEROPENEM 1 MLS/HR: 1 INJECTION, POWDER, FOR SOLUTION INTRAVENOUS at 13:23

## 2019-05-22 RX ADMIN — ENOXAPARIN SODIUM SCH MG: 100 INJECTION SUBCUTANEOUS at 09:23

## 2019-05-23 VITALS — DIASTOLIC BLOOD PRESSURE: 70 MMHG | HEART RATE: 75 BPM | SYSTOLIC BLOOD PRESSURE: 159 MMHG | RESPIRATION RATE: 18 BRPM

## 2019-05-23 VITALS — DIASTOLIC BLOOD PRESSURE: 68 MMHG | SYSTOLIC BLOOD PRESSURE: 122 MMHG | HEART RATE: 79 BPM | RESPIRATION RATE: 19 BRPM

## 2019-05-23 VITALS — SYSTOLIC BLOOD PRESSURE: 129 MMHG | RESPIRATION RATE: 18 BRPM | HEART RATE: 75 BPM | DIASTOLIC BLOOD PRESSURE: 59 MMHG

## 2019-05-23 VITALS — RESPIRATION RATE: 19 BRPM | SYSTOLIC BLOOD PRESSURE: 125 MMHG | HEART RATE: 83 BPM | DIASTOLIC BLOOD PRESSURE: 58 MMHG

## 2019-05-23 VITALS — DIASTOLIC BLOOD PRESSURE: 66 MMHG | HEART RATE: 85 BPM | SYSTOLIC BLOOD PRESSURE: 122 MMHG | RESPIRATION RATE: 19 BRPM

## 2019-05-23 VITALS — HEART RATE: 75 BPM

## 2019-05-23 VITALS — DIASTOLIC BLOOD PRESSURE: 62 MMHG | SYSTOLIC BLOOD PRESSURE: 128 MMHG | RESPIRATION RATE: 18 BRPM | HEART RATE: 62 BPM

## 2019-05-23 VITALS — RESPIRATION RATE: 19 BRPM | SYSTOLIC BLOOD PRESSURE: 127 MMHG | HEART RATE: 76 BPM | DIASTOLIC BLOOD PRESSURE: 60 MMHG

## 2019-05-23 VITALS — HEART RATE: 73 BPM

## 2019-05-23 VITALS — HEART RATE: 68 BPM

## 2019-05-23 VITALS — HEART RATE: 71 BPM

## 2019-05-23 LAB
ADD MAN DIFF?: NO
ALANINE AMINOTRANSFERASE: 23 IU/L (ref 13–69)
ALBUMIN/GLOBULIN RATIO: 1.28
ALBUMIN: 3.2 G/DL (ref 3.3–4.9)
ALKALINE PHOSPHATASE: 59 IU/L (ref 42–121)
ANION GAP: 5 (ref 5–13)
ASPARTATE AMINO TRANSFERASE: 19 IU/L (ref 15–46)
BASOPHIL #: 0.1 10^3/UL (ref 0–0.1)
BASOPHILS %: 0.6 % (ref 0–2)
BILIRUBIN,DIRECT: 0 MG/DL (ref 0–0.2)
BILIRUBIN,TOTAL: 0.6 MG/DL (ref 0.2–1.3)
BLOOD UREA NITROGEN: 12 MG/DL (ref 7–20)
CALCIUM: 9.7 MG/DL (ref 8.4–10.2)
CARBON DIOXIDE: 26 MMOL/L (ref 21–31)
CHLORIDE: 106 MMOL/L (ref 97–110)
CREATININE: 0.41 MG/DL (ref 0.44–1)
EOSINOPHILS #: 0.3 10^3/UL (ref 0–0.5)
EOSINOPHILS %: 3.1 % (ref 0–7)
GLOBULIN: 2.5 G/DL (ref 1.3–3.2)
GLUCOSE: 91 MG/DL (ref 70–220)
HEMATOCRIT: 37.7 % (ref 37–47)
HEMOGLOBIN: 12.3 G/DL (ref 12–16)
IMMATURE GRANS #M: 0.07 10^3/UL (ref 0–0.03)
IMMATURE GRANS % (M): 0.7 % (ref 0–0.43)
INR: 0.97
LYMPHOCYTES #: 1.6 10^3/UL (ref 0.8–2.9)
LYMPHOCYTES %: 15.6 % (ref 15–51)
MAGNESIUM: 2.4 MG/DL (ref 1.7–2.5)
MEAN CORPUSCULAR HEMOGLOBIN: 28.7 PG (ref 29–33)
MEAN CORPUSCULAR HGB CONC: 32.6 G/DL (ref 32–37)
MEAN CORPUSCULAR VOLUME: 88.1 FL (ref 82–101)
MEAN PLATELET VOLUME: 10.5 FL (ref 7.4–10.4)
MONOCYTE #: 1.1 10^3/UL (ref 0.3–0.9)
MONOCYTES %: 10.9 % (ref 0–11)
NEUTROPHIL #: 7 10^3/UL (ref 1.6–7.5)
NEUTROPHILS %: 69.1 % (ref 39–77)
NUCLEATED RED BLOOD CELLS #: 0 10^3/UL (ref 0–0)
NUCLEATED RED BLOOD CELLS%: 0 /100WBC (ref 0–0)
PARTIAL THROMBOPLASTIN TIME: 29.7 SEC (ref 23–35)
PHOSPHORUS: 2.1 MG/DL (ref 2.5–4.9)
PLATELET COUNT: 202 10^3/UL (ref 140–415)
POTASSIUM: 3.9 MMOL/L (ref 3.5–5.1)
PROTIME: 13 SEC (ref 11.9–14.9)
PT RATIO: 1
RED BLOOD COUNT: 4.28 10^6/UL (ref 4.2–5.4)
RED CELL DISTRIBUTION WIDTH: 13.9 % (ref 11.5–14.5)
SODIUM: 137 MMOL/L (ref 135–144)
TOTAL PROTEIN: 5.7 G/DL (ref 6.1–8.1)
WHITE BLOOD COUNT: 10.1 10^3/UL (ref 4.8–10.8)

## 2019-05-23 RX ADMIN — MOMETASONE FUROATE 1 PUFF: 220 INHALANT RESPIRATORY (INHALATION) at 09:02

## 2019-05-23 RX ADMIN — MEROPENEM 1 MLS/HR: 1 INJECTION, POWDER, FOR SOLUTION INTRAVENOUS at 13:13

## 2019-05-23 RX ADMIN — MOMETASONE FUROATE 1 PUFF: 220 INHALANT RESPIRATORY (INHALATION) at 20:11

## 2019-05-23 RX ADMIN — DILTIAZEM HYDROCHLORIDE SCH MG: 30 TABLET, FILM COATED ORAL at 20:11

## 2019-05-23 RX ADMIN — DILTIAZEM HYDROCHLORIDE 1 MG: 5 INJECTION INTRAVENOUS at 00:45

## 2019-05-23 RX ADMIN — MORPHINE SULFATE 1 MG: 2 INJECTION, SOLUTION INTRAMUSCULAR; INTRAVENOUS at 06:11

## 2019-05-23 RX ADMIN — CYCLOSPORINE 1 DROP: 0.5 EMULSION OPHTHALMIC at 20:10

## 2019-05-23 RX ADMIN — POTASSIUM & SODIUM PHOSPHATES POWDER PACK 280-160-250 MG 1 MG: 280-160-250 PACK at 09:02

## 2019-05-23 RX ADMIN — ACETAMINOPHEN 1 MG: 500 TABLET, FILM COATED ORAL at 18:05

## 2019-05-23 RX ADMIN — MOMETASONE FUROATE SCH PUFF: 220 INHALANT RESPIRATORY (INHALATION) at 20:11

## 2019-05-23 RX ADMIN — MEROPENEM SCH MLS/HR: 1 INJECTION, POWDER, FOR SOLUTION INTRAVENOUS at 06:11

## 2019-05-23 RX ADMIN — MEROPENEM SCH MLS/HR: 1 INJECTION, POWDER, FOR SOLUTION INTRAVENOUS at 20:10

## 2019-05-23 RX ADMIN — DILTIAZEM HYDROCHLORIDE SCH MG: 5 INJECTION INTRAVENOUS at 06:00

## 2019-05-23 RX ADMIN — DIGOXIN 1 MCG: 0.25 INJECTION INTRAMUSCULAR; INTRAVENOUS at 13:13

## 2019-05-23 RX ADMIN — DILTIAZEM HYDROCHLORIDE SCH MG: 5 INJECTION INTRAVENOUS at 12:16

## 2019-05-23 RX ADMIN — MEROPENEM 1 MLS/HR: 1 INJECTION, POWDER, FOR SOLUTION INTRAVENOUS at 06:11

## 2019-05-23 RX ADMIN — DIGOXIN SCH MCG: 0.25 INJECTION INTRAMUSCULAR; INTRAVENOUS at 13:13

## 2019-05-23 RX ADMIN — FAMOTIDINE SCH MG: 20 TABLET ORAL at 09:01

## 2019-05-23 RX ADMIN — DILTIAZEM HYDROCHLORIDE 1 MG: 30 TABLET, FILM COATED ORAL at 20:11

## 2019-05-23 RX ADMIN — SERTRALINE HYDROCHLORIDE 1 MG: 50 TABLET ORAL at 09:01

## 2019-05-23 RX ADMIN — MOMETASONE FUROATE SCH PUFF: 220 INHALANT RESPIRATORY (INHALATION) at 09:02

## 2019-05-23 RX ADMIN — DILTIAZEM HYDROCHLORIDE 1 MG: 5 INJECTION INTRAVENOUS at 12:16

## 2019-05-23 RX ADMIN — FAMOTIDINE 1 MG: 20 TABLET ORAL at 09:01

## 2019-05-23 RX ADMIN — ENOXAPARIN SODIUM 1 MG: 100 INJECTION SUBCUTANEOUS at 09:28

## 2019-05-23 RX ADMIN — MEROPENEM SCH MLS/HR: 1 INJECTION, POWDER, FOR SOLUTION INTRAVENOUS at 13:13

## 2019-05-23 RX ADMIN — DILTIAZEM HYDROCHLORIDE SCH MG: 5 INJECTION INTRAVENOUS at 00:45

## 2019-05-23 RX ADMIN — SERTRALINE HYDROCHLORIDE SCH MG: 50 TABLET ORAL at 09:01

## 2019-05-23 RX ADMIN — PANCRELIPASE 1 CAP: 24000; 76000; 120000 CAPSULE, DELAYED RELEASE PELLETS ORAL at 18:15

## 2019-05-23 RX ADMIN — MEROPENEM 1 MLS/HR: 1 INJECTION, POWDER, FOR SOLUTION INTRAVENOUS at 20:10

## 2019-05-23 RX ADMIN — ENOXAPARIN SODIUM SCH MG: 100 INJECTION SUBCUTANEOUS at 09:28

## 2019-05-23 RX ADMIN — DILTIAZEM HYDROCHLORIDE 1 MG: 5 INJECTION INTRAVENOUS at 06:00

## 2019-05-23 RX ADMIN — CYCLOSPORINE 1 DROP: 0.5 EMULSION OPHTHALMIC at 09:06

## 2019-05-24 VITALS — DIASTOLIC BLOOD PRESSURE: 56 MMHG | SYSTOLIC BLOOD PRESSURE: 123 MMHG | RESPIRATION RATE: 18 BRPM | HEART RATE: 73 BPM

## 2019-05-24 VITALS — DIASTOLIC BLOOD PRESSURE: 55 MMHG | RESPIRATION RATE: 19 BRPM | HEART RATE: 64 BPM | SYSTOLIC BLOOD PRESSURE: 110 MMHG

## 2019-05-24 VITALS — RESPIRATION RATE: 18 BRPM | DIASTOLIC BLOOD PRESSURE: 68 MMHG | HEART RATE: 89 BPM | SYSTOLIC BLOOD PRESSURE: 123 MMHG

## 2019-05-24 VITALS — SYSTOLIC BLOOD PRESSURE: 126 MMHG | RESPIRATION RATE: 18 BRPM | DIASTOLIC BLOOD PRESSURE: 68 MMHG | HEART RATE: 77 BPM

## 2019-05-24 VITALS — SYSTOLIC BLOOD PRESSURE: 119 MMHG | HEART RATE: 75 BPM | RESPIRATION RATE: 17 BRPM | DIASTOLIC BLOOD PRESSURE: 68 MMHG

## 2019-05-24 VITALS — SYSTOLIC BLOOD PRESSURE: 138 MMHG | DIASTOLIC BLOOD PRESSURE: 68 MMHG | HEART RATE: 67 BPM | RESPIRATION RATE: 18 BRPM

## 2019-05-24 VITALS — HEART RATE: 68 BPM

## 2019-05-24 VITALS — HEART RATE: 62 BPM

## 2019-05-24 VITALS — HEART RATE: 69 BPM

## 2019-05-24 LAB
ADD MAN DIFF?: NO
ALANINE AMINOTRANSFERASE: 19 IU/L (ref 13–69)
ALBUMIN/GLOBULIN RATIO: 1.24
ALBUMIN: 3.1 G/DL (ref 3.3–4.9)
ALKALINE PHOSPHATASE: 57 IU/L (ref 42–121)
ANION GAP: 7 (ref 5–13)
ASPARTATE AMINO TRANSFERASE: 15 IU/L (ref 15–46)
BASOPHIL #: 0.1 10^3/UL (ref 0–0.1)
BASOPHILS %: 0.8 % (ref 0–2)
BILIRUBIN,DIRECT: 0 MG/DL (ref 0–0.2)
BILIRUBIN,TOTAL: 0.5 MG/DL (ref 0.2–1.3)
BLOOD UREA NITROGEN: 15 MG/DL (ref 7–20)
CALCIUM: 10 MG/DL (ref 8.4–10.2)
CARBON DIOXIDE: 26 MMOL/L (ref 21–31)
CHLORIDE: 106 MMOL/L (ref 97–110)
CREATININE: 0.4 MG/DL (ref 0.44–1)
EOSINOPHILS #: 0.4 10^3/UL (ref 0–0.5)
EOSINOPHILS %: 4.1 % (ref 0–7)
GLOBULIN: 2.5 G/DL (ref 1.3–3.2)
GLUCOSE: 72 MG/DL (ref 70–220)
HEMATOCRIT: 38.3 % (ref 37–47)
HEMOGLOBIN: 12.2 G/DL (ref 12–16)
IMMATURE GRANS #M: 0.06 10^3/UL (ref 0–0.03)
IMMATURE GRANS % (M): 0.7 % (ref 0–0.43)
INR: 0.9
LYMPHOCYTES #: 1.4 10^3/UL (ref 0.8–2.9)
LYMPHOCYTES %: 16.8 % (ref 15–51)
MAGNESIUM: 2.4 MG/DL (ref 1.7–2.5)
MEAN CORPUSCULAR HEMOGLOBIN: 28.4 PG (ref 29–33)
MEAN CORPUSCULAR HGB CONC: 31.9 G/DL (ref 32–37)
MEAN CORPUSCULAR VOLUME: 89.1 FL (ref 82–101)
MEAN PLATELET VOLUME: 10.4 FL (ref 7.4–10.4)
MONOCYTE #: 0.9 10^3/UL (ref 0.3–0.9)
MONOCYTES %: 10.5 % (ref 0–11)
NEUTROPHIL #: 5.7 10^3/UL (ref 1.6–7.5)
NEUTROPHILS %: 67.1 % (ref 39–77)
NUCLEATED RED BLOOD CELLS #: 0 10^3/UL (ref 0–0)
NUCLEATED RED BLOOD CELLS%: 0 /100WBC (ref 0–0)
PHOSPHORUS: 2.3 MG/DL (ref 2.5–4.9)
PLATELET COUNT: 212 10^3/UL (ref 140–415)
POTASSIUM: 3.6 MMOL/L (ref 3.5–5.1)
PROTIME: 12.3 SEC (ref 11.9–14.9)
PT RATIO: 1
RED BLOOD COUNT: 4.3 10^6/UL (ref 4.2–5.4)
RED CELL DISTRIBUTION WIDTH: 13.8 % (ref 11.5–14.5)
SODIUM: 139 MMOL/L (ref 135–144)
TOTAL PROTEIN: 5.6 G/DL (ref 6.1–8.1)
WHITE BLOOD COUNT: 8.5 10^3/UL (ref 4.8–10.8)

## 2019-05-24 RX ADMIN — PANCRELIPASE 1 CAP: 24000; 76000; 120000 CAPSULE, DELAYED RELEASE PELLETS ORAL at 17:23

## 2019-05-24 RX ADMIN — WARFARIN SODIUM 1 MG: 5 TABLET ORAL at 17:23

## 2019-05-24 RX ADMIN — PANCRELIPASE 1 CAP: 24000; 76000; 120000 CAPSULE, DELAYED RELEASE PELLETS ORAL at 09:24

## 2019-05-24 RX ADMIN — DILTIAZEM HYDROCHLORIDE SCH MG: 30 TABLET, FILM COATED ORAL at 09:25

## 2019-05-24 RX ADMIN — ENOXAPARIN SODIUM 1 MG: 100 INJECTION SUBCUTANEOUS at 09:36

## 2019-05-24 RX ADMIN — MOMETASONE FUROATE SCH PUFF: 220 INHALANT RESPIRATORY (INHALATION) at 09:24

## 2019-05-24 RX ADMIN — MOMETASONE FUROATE 1 PUFF: 220 INHALANT RESPIRATORY (INHALATION) at 09:24

## 2019-05-24 RX ADMIN — MEROPENEM SCH MLS/HR: 1 INJECTION, POWDER, FOR SOLUTION INTRAVENOUS at 13:27

## 2019-05-24 RX ADMIN — MEROPENEM SCH MLS/HR: 1 INJECTION, POWDER, FOR SOLUTION INTRAVENOUS at 21:12

## 2019-05-24 RX ADMIN — POTASSIUM & SODIUM PHOSPHATES POWDER PACK 280-160-250 MG 1 MG: 280-160-250 PACK at 09:24

## 2019-05-24 RX ADMIN — DILTIAZEM HYDROCHLORIDE 1 MG: 30 TABLET, FILM COATED ORAL at 13:27

## 2019-05-24 RX ADMIN — DIGOXIN SCH MG: 125 TABLET ORAL at 13:28

## 2019-05-24 RX ADMIN — DILTIAZEM HYDROCHLORIDE SCH MG: 30 TABLET, FILM COATED ORAL at 21:13

## 2019-05-24 RX ADMIN — MEROPENEM SCH MLS/HR: 1 INJECTION, POWDER, FOR SOLUTION INTRAVENOUS at 06:29

## 2019-05-24 RX ADMIN — FAMOTIDINE 1 MG: 20 TABLET ORAL at 09:25

## 2019-05-24 RX ADMIN — SERTRALINE HYDROCHLORIDE 1 MG: 50 TABLET ORAL at 09:25

## 2019-05-24 RX ADMIN — MEROPENEM 1 MLS/HR: 1 INJECTION, POWDER, FOR SOLUTION INTRAVENOUS at 06:29

## 2019-05-24 RX ADMIN — FAMOTIDINE SCH MG: 20 TABLET ORAL at 09:25

## 2019-05-24 RX ADMIN — DILTIAZEM HYDROCHLORIDE 1 MG: 30 TABLET, FILM COATED ORAL at 21:13

## 2019-05-24 RX ADMIN — SERTRALINE HYDROCHLORIDE SCH MG: 50 TABLET ORAL at 09:25

## 2019-05-24 RX ADMIN — MEROPENEM 1 MLS/HR: 1 INJECTION, POWDER, FOR SOLUTION INTRAVENOUS at 21:12

## 2019-05-24 RX ADMIN — CYCLOSPORINE 1 DROP: 0.5 EMULSION OPHTHALMIC at 09:00

## 2019-05-24 RX ADMIN — CYCLOSPORINE 1 DROP: 0.5 EMULSION OPHTHALMIC at 21:12

## 2019-05-24 RX ADMIN — ENOXAPARIN SODIUM SCH MG: 100 INJECTION SUBCUTANEOUS at 09:36

## 2019-05-24 RX ADMIN — DILTIAZEM HYDROCHLORIDE SCH MG: 30 TABLET, FILM COATED ORAL at 13:27

## 2019-05-24 RX ADMIN — DILTIAZEM HYDROCHLORIDE 1 MG: 30 TABLET, FILM COATED ORAL at 09:25

## 2019-05-24 RX ADMIN — MOMETASONE FUROATE 1 PUFF: 220 INHALANT RESPIRATORY (INHALATION) at 20:00

## 2019-05-24 RX ADMIN — DIGOXIN 1 MG: 125 TABLET ORAL at 13:28

## 2019-05-24 RX ADMIN — MEROPENEM 1 MLS/HR: 1 INJECTION, POWDER, FOR SOLUTION INTRAVENOUS at 13:27

## 2019-05-24 RX ADMIN — ACETAMINOPHEN 1 MG: 500 TABLET, FILM COATED ORAL at 00:02

## 2019-05-24 RX ADMIN — PANCRELIPASE 1 CAP: 24000; 76000; 120000 CAPSULE, DELAYED RELEASE PELLETS ORAL at 13:26

## 2019-05-24 RX ADMIN — POTASSIUM & SODIUM PHOSPHATES POWDER PACK 280-160-250 MG 1 MG: 280-160-250 PACK at 21:12

## 2019-05-24 RX ADMIN — MOMETASONE FUROATE SCH PUFF: 220 INHALANT RESPIRATORY (INHALATION) at 20:00

## 2019-05-25 VITALS — HEART RATE: 63 BPM | SYSTOLIC BLOOD PRESSURE: 157 MMHG | DIASTOLIC BLOOD PRESSURE: 71 MMHG | RESPIRATION RATE: 18 BRPM

## 2019-05-25 VITALS — HEART RATE: 70 BPM | SYSTOLIC BLOOD PRESSURE: 147 MMHG | DIASTOLIC BLOOD PRESSURE: 67 MMHG

## 2019-05-25 VITALS — RESPIRATION RATE: 20 BRPM | DIASTOLIC BLOOD PRESSURE: 63 MMHG | SYSTOLIC BLOOD PRESSURE: 138 MMHG | HEART RATE: 63 BPM

## 2019-05-25 VITALS — RESPIRATION RATE: 20 BRPM | SYSTOLIC BLOOD PRESSURE: 125 MMHG | HEART RATE: 65 BPM | DIASTOLIC BLOOD PRESSURE: 62 MMHG

## 2019-05-25 VITALS — HEART RATE: 74 BPM

## 2019-05-25 VITALS — SYSTOLIC BLOOD PRESSURE: 123 MMHG | DIASTOLIC BLOOD PRESSURE: 58 MMHG | HEART RATE: 65 BPM

## 2019-05-25 VITALS — DIASTOLIC BLOOD PRESSURE: 65 MMHG | RESPIRATION RATE: 18 BRPM | HEART RATE: 78 BPM | SYSTOLIC BLOOD PRESSURE: 146 MMHG

## 2019-05-25 VITALS — HEART RATE: 72 BPM

## 2019-05-25 VITALS — HEART RATE: 83 BPM | DIASTOLIC BLOOD PRESSURE: 65 MMHG | SYSTOLIC BLOOD PRESSURE: 144 MMHG | RESPIRATION RATE: 20 BRPM

## 2019-05-25 VITALS — HEART RATE: 65 BPM

## 2019-05-25 VITALS — HEART RATE: 68 BPM

## 2019-05-25 LAB
ADD MAN DIFF?: NO
ALANINE AMINOTRANSFERASE: 21 IU/L (ref 13–69)
ALBUMIN/GLOBULIN RATIO: 1.24
ALBUMIN: 3.1 G/DL (ref 3.3–4.9)
ALKALINE PHOSPHATASE: 57 IU/L (ref 42–121)
ANION GAP: 9 (ref 5–13)
ASPARTATE AMINO TRANSFERASE: 14 IU/L (ref 15–46)
BASOPHIL #: 0.1 10^3/UL (ref 0–0.1)
BASOPHILS %: 0.7 % (ref 0–2)
BILIRUBIN,DIRECT: 0 MG/DL (ref 0–0.2)
BILIRUBIN,TOTAL: 0.4 MG/DL (ref 0.2–1.3)
BLOOD UREA NITROGEN: 13 MG/DL (ref 7–20)
CALCIUM: 9.9 MG/DL (ref 8.4–10.2)
CARBON DIOXIDE: 26 MMOL/L (ref 21–31)
CHLORIDE: 105 MMOL/L (ref 97–110)
CREATININE: 0.39 MG/DL (ref 0.44–1)
EOSINOPHILS #: 0.2 10^3/UL (ref 0–0.5)
EOSINOPHILS %: 2.9 % (ref 0–7)
GLOBULIN: 2.5 G/DL (ref 1.3–3.2)
GLUCOSE: 87 MG/DL (ref 70–220)
HEMATOCRIT: 37.1 % (ref 37–47)
HEMOGLOBIN: 11.9 G/DL (ref 12–16)
IMMATURE GRANS #M: 0.05 10^3/UL (ref 0–0.03)
IMMATURE GRANS % (M): 0.6 % (ref 0–0.43)
INR: 0.93
LYMPHOCYTES #: 1.4 10^3/UL (ref 0.8–2.9)
LYMPHOCYTES %: 17.1 % (ref 15–51)
MAGNESIUM: 2.3 MG/DL (ref 1.7–2.5)
MEAN CORPUSCULAR HEMOGLOBIN: 28.7 PG (ref 29–33)
MEAN CORPUSCULAR HGB CONC: 32.1 G/DL (ref 32–37)
MEAN CORPUSCULAR VOLUME: 89.6 FL (ref 82–101)
MEAN PLATELET VOLUME: 10.4 FL (ref 7.4–10.4)
MONOCYTE #: 0.8 10^3/UL (ref 0.3–0.9)
MONOCYTES %: 10.2 % (ref 0–11)
NEUTROPHIL #: 5.7 10^3/UL (ref 1.6–7.5)
NEUTROPHILS %: 68.5 % (ref 39–77)
NUCLEATED RED BLOOD CELLS #: 0 10^3/UL (ref 0–0)
NUCLEATED RED BLOOD CELLS%: 0 /100WBC (ref 0–0)
PHOSPHORUS: 2.5 MG/DL (ref 2.5–4.9)
PLATELET COUNT: 223 10^3/UL (ref 140–415)
POTASSIUM: 3.9 MMOL/L (ref 3.5–5.1)
PROTIME: 12.6 SEC (ref 11.9–14.9)
PT RATIO: 1
RED BLOOD COUNT: 4.14 10^6/UL (ref 4.2–5.4)
RED CELL DISTRIBUTION WIDTH: 13.5 % (ref 11.5–14.5)
SODIUM: 140 MMOL/L (ref 135–144)
TOTAL PROTEIN: 5.6 G/DL (ref 6.1–8.1)
WHITE BLOOD COUNT: 8.3 10^3/UL (ref 4.8–10.8)

## 2019-05-25 RX ADMIN — POTASSIUM & SODIUM PHOSPHATES POWDER PACK 280-160-250 MG 1 MG: 280-160-250 PACK at 20:39

## 2019-05-25 RX ADMIN — DILTIAZEM HYDROCHLORIDE SCH MG: 30 TABLET, FILM COATED ORAL at 08:35

## 2019-05-25 RX ADMIN — DILTIAZEM HYDROCHLORIDE 1 MG: 30 TABLET, FILM COATED ORAL at 20:39

## 2019-05-25 RX ADMIN — MEROPENEM 1 MLS/HR: 1 INJECTION, POWDER, FOR SOLUTION INTRAVENOUS at 20:39

## 2019-05-25 RX ADMIN — MEROPENEM SCH MLS/HR: 1 INJECTION, POWDER, FOR SOLUTION INTRAVENOUS at 20:39

## 2019-05-25 RX ADMIN — FAMOTIDINE 1 MG: 20 TABLET ORAL at 08:26

## 2019-05-25 RX ADMIN — CYCLOSPORINE 1 DROP: 0.5 EMULSION OPHTHALMIC at 20:38

## 2019-05-25 RX ADMIN — POTASSIUM & SODIUM PHOSPHATES POWDER PACK 280-160-250 MG 1 MG: 280-160-250 PACK at 08:26

## 2019-05-25 RX ADMIN — DILTIAZEM HYDROCHLORIDE 1 MG: 30 TABLET, FILM COATED ORAL at 13:15

## 2019-05-25 RX ADMIN — PANCRELIPASE 1 CAP: 24000; 76000; 120000 CAPSULE, DELAYED RELEASE PELLETS ORAL at 17:42

## 2019-05-25 RX ADMIN — CYCLOSPORINE 1 DROP: 0.5 EMULSION OPHTHALMIC at 08:26

## 2019-05-25 RX ADMIN — MOMETASONE FUROATE SCH PUFF: 220 INHALANT RESPIRATORY (INHALATION) at 21:51

## 2019-05-25 RX ADMIN — DILTIAZEM HYDROCHLORIDE SCH MG: 30 TABLET, FILM COATED ORAL at 20:39

## 2019-05-25 RX ADMIN — DILTIAZEM HYDROCHLORIDE 1 MG: 30 TABLET, FILM COATED ORAL at 08:35

## 2019-05-25 RX ADMIN — SERTRALINE HYDROCHLORIDE 1 MG: 50 TABLET ORAL at 08:26

## 2019-05-25 RX ADMIN — MOMETASONE FUROATE 1 PUFF: 220 INHALANT RESPIRATORY (INHALATION) at 08:28

## 2019-05-25 RX ADMIN — DIGOXIN 1 MG: 125 TABLET ORAL at 13:14

## 2019-05-25 RX ADMIN — PANCRELIPASE 1 CAP: 24000; 76000; 120000 CAPSULE, DELAYED RELEASE PELLETS ORAL at 13:14

## 2019-05-25 RX ADMIN — WARFARIN SODIUM 1 MG: 5 TABLET ORAL at 17:43

## 2019-05-25 RX ADMIN — PANCRELIPASE 1 CAP: 24000; 76000; 120000 CAPSULE, DELAYED RELEASE PELLETS ORAL at 08:26

## 2019-05-25 RX ADMIN — ENOXAPARIN SODIUM SCH MG: 100 INJECTION SUBCUTANEOUS at 08:31

## 2019-05-25 RX ADMIN — MEROPENEM 1 MLS/HR: 1 INJECTION, POWDER, FOR SOLUTION INTRAVENOUS at 08:26

## 2019-05-25 RX ADMIN — FAMOTIDINE SCH MG: 20 TABLET ORAL at 08:26

## 2019-05-25 RX ADMIN — DILTIAZEM HYDROCHLORIDE SCH MG: 30 TABLET, FILM COATED ORAL at 13:15

## 2019-05-25 RX ADMIN — MEROPENEM SCH MLS/HR: 1 INJECTION, POWDER, FOR SOLUTION INTRAVENOUS at 08:26

## 2019-05-25 RX ADMIN — ENOXAPARIN SODIUM 1 MG: 100 INJECTION SUBCUTANEOUS at 08:31

## 2019-05-25 RX ADMIN — DIGOXIN SCH MG: 125 TABLET ORAL at 13:14

## 2019-05-25 RX ADMIN — MOMETASONE FUROATE SCH PUFF: 220 INHALANT RESPIRATORY (INHALATION) at 08:28

## 2019-05-25 RX ADMIN — MOMETASONE FUROATE 1 PUFF: 220 INHALANT RESPIRATORY (INHALATION) at 21:51

## 2019-05-25 RX ADMIN — SERTRALINE HYDROCHLORIDE SCH MG: 50 TABLET ORAL at 08:26

## 2019-05-26 VITALS — SYSTOLIC BLOOD PRESSURE: 116 MMHG | RESPIRATION RATE: 18 BRPM | DIASTOLIC BLOOD PRESSURE: 55 MMHG | HEART RATE: 63 BPM

## 2019-05-26 VITALS — SYSTOLIC BLOOD PRESSURE: 121 MMHG | HEART RATE: 63 BPM | RESPIRATION RATE: 20 BRPM | DIASTOLIC BLOOD PRESSURE: 57 MMHG

## 2019-05-26 VITALS — SYSTOLIC BLOOD PRESSURE: 130 MMHG | DIASTOLIC BLOOD PRESSURE: 64 MMHG | RESPIRATION RATE: 18 BRPM | HEART RATE: 76 BPM

## 2019-05-26 VITALS — HEART RATE: 72 BPM

## 2019-05-26 VITALS — SYSTOLIC BLOOD PRESSURE: 116 MMHG | RESPIRATION RATE: 19 BRPM | HEART RATE: 69 BPM | DIASTOLIC BLOOD PRESSURE: 58 MMHG

## 2019-05-26 VITALS — HEART RATE: 66 BPM

## 2019-05-26 VITALS — RESPIRATION RATE: 18 BRPM | DIASTOLIC BLOOD PRESSURE: 65 MMHG | SYSTOLIC BLOOD PRESSURE: 138 MMHG | HEART RATE: 69 BPM

## 2019-05-26 VITALS — HEART RATE: 73 BPM

## 2019-05-26 VITALS — HEART RATE: 70 BPM

## 2019-05-26 LAB
INR: 1.02
PHOSPHORUS: 2.9 MG/DL (ref 2.5–4.9)
PROTIME: 13.5 SEC (ref 11.9–14.9)
PT RATIO: 1.1

## 2019-05-26 RX ADMIN — MEROPENEM SCH MLS/HR: 1 INJECTION, POWDER, FOR SOLUTION INTRAVENOUS at 08:45

## 2019-05-26 RX ADMIN — POTASSIUM & SODIUM PHOSPHATES POWDER PACK 280-160-250 MG 1 MG: 280-160-250 PACK at 09:06

## 2019-05-26 RX ADMIN — PANCRELIPASE 1 CAP: 24000; 76000; 120000 CAPSULE, DELAYED RELEASE PELLETS ORAL at 08:44

## 2019-05-26 RX ADMIN — FAMOTIDINE SCH MG: 20 TABLET ORAL at 08:44

## 2019-05-26 RX ADMIN — WARFARIN SODIUM 1 MG: 5 TABLET ORAL at 17:37

## 2019-05-26 RX ADMIN — MOMETASONE FUROATE 1 PUFF: 220 INHALANT RESPIRATORY (INHALATION) at 09:06

## 2019-05-26 RX ADMIN — MEROPENEM SCH MLS/HR: 1 INJECTION, POWDER, FOR SOLUTION INTRAVENOUS at 20:37

## 2019-05-26 RX ADMIN — ENOXAPARIN SODIUM 1 MG: 100 INJECTION SUBCUTANEOUS at 09:03

## 2019-05-26 RX ADMIN — PANCRELIPASE 1 CAP: 24000; 76000; 120000 CAPSULE, DELAYED RELEASE PELLETS ORAL at 12:03

## 2019-05-26 RX ADMIN — MEROPENEM 1 MLS/HR: 1 INJECTION, POWDER, FOR SOLUTION INTRAVENOUS at 08:45

## 2019-05-26 RX ADMIN — CYCLOSPORINE 1 DROP: 0.5 EMULSION OPHTHALMIC at 20:36

## 2019-05-26 RX ADMIN — PANCRELIPASE 1 CAP: 24000; 76000; 120000 CAPSULE, DELAYED RELEASE PELLETS ORAL at 17:37

## 2019-05-26 RX ADMIN — DIGOXIN 1 MG: 125 TABLET ORAL at 12:45

## 2019-05-26 RX ADMIN — POTASSIUM & SODIUM PHOSPHATES POWDER PACK 280-160-250 MG 1 MG: 280-160-250 PACK at 20:36

## 2019-05-26 RX ADMIN — DILTIAZEM HYDROCHLORIDE SCH MG: 30 TABLET, FILM COATED ORAL at 08:45

## 2019-05-26 RX ADMIN — MOMETASONE FUROATE SCH PUFF: 220 INHALANT RESPIRATORY (INHALATION) at 20:36

## 2019-05-26 RX ADMIN — SERTRALINE HYDROCHLORIDE 1 MG: 50 TABLET ORAL at 08:44

## 2019-05-26 RX ADMIN — MOMETASONE FUROATE SCH PUFF: 220 INHALANT RESPIRATORY (INHALATION) at 09:06

## 2019-05-26 RX ADMIN — SERTRALINE HYDROCHLORIDE SCH MG: 50 TABLET ORAL at 08:44

## 2019-05-26 RX ADMIN — FAMOTIDINE 1 MG: 20 TABLET ORAL at 08:44

## 2019-05-26 RX ADMIN — CYCLOSPORINE 1 DROP: 0.5 EMULSION OPHTHALMIC at 08:44

## 2019-05-26 RX ADMIN — MOMETASONE FUROATE 1 PUFF: 220 INHALANT RESPIRATORY (INHALATION) at 20:36

## 2019-05-26 RX ADMIN — MEROPENEM 1 MLS/HR: 1 INJECTION, POWDER, FOR SOLUTION INTRAVENOUS at 20:37

## 2019-05-26 RX ADMIN — DIGOXIN SCH MG: 125 TABLET ORAL at 12:45

## 2019-05-26 RX ADMIN — MAGNESIUM HYDROXIDE 1 ML: 400 SUSPENSION ORAL at 09:55

## 2019-05-26 RX ADMIN — DILTIAZEM HYDROCHLORIDE SCH MG: 30 TABLET, FILM COATED ORAL at 20:37

## 2019-05-26 RX ADMIN — DILTIAZEM HYDROCHLORIDE 1 MG: 30 TABLET, FILM COATED ORAL at 12:45

## 2019-05-26 RX ADMIN — ENOXAPARIN SODIUM SCH MG: 100 INJECTION SUBCUTANEOUS at 09:03

## 2019-05-26 RX ADMIN — DILTIAZEM HYDROCHLORIDE SCH MG: 30 TABLET, FILM COATED ORAL at 12:45

## 2019-05-26 RX ADMIN — DILTIAZEM HYDROCHLORIDE 1 MG: 30 TABLET, FILM COATED ORAL at 08:45

## 2019-05-26 RX ADMIN — DILTIAZEM HYDROCHLORIDE 1 MG: 30 TABLET, FILM COATED ORAL at 20:37

## 2019-05-27 VITALS — RESPIRATION RATE: 18 BRPM | DIASTOLIC BLOOD PRESSURE: 63 MMHG | HEART RATE: 75 BPM | SYSTOLIC BLOOD PRESSURE: 141 MMHG

## 2019-05-27 VITALS — DIASTOLIC BLOOD PRESSURE: 76 MMHG | SYSTOLIC BLOOD PRESSURE: 124 MMHG | RESPIRATION RATE: 18 BRPM | HEART RATE: 71 BPM

## 2019-05-27 VITALS — DIASTOLIC BLOOD PRESSURE: 65 MMHG | HEART RATE: 66 BPM | RESPIRATION RATE: 17 BRPM | SYSTOLIC BLOOD PRESSURE: 135 MMHG

## 2019-05-27 VITALS — RESPIRATION RATE: 18 BRPM | SYSTOLIC BLOOD PRESSURE: 119 MMHG | HEART RATE: 69 BPM | DIASTOLIC BLOOD PRESSURE: 59 MMHG

## 2019-05-27 VITALS — DIASTOLIC BLOOD PRESSURE: 61 MMHG | RESPIRATION RATE: 18 BRPM | HEART RATE: 60 BPM | SYSTOLIC BLOOD PRESSURE: 128 MMHG

## 2019-05-27 VITALS — SYSTOLIC BLOOD PRESSURE: 121 MMHG | RESPIRATION RATE: 18 BRPM | DIASTOLIC BLOOD PRESSURE: 58 MMHG | HEART RATE: 60 BPM

## 2019-05-27 VITALS — HEART RATE: 60 BPM

## 2019-05-27 VITALS — HEART RATE: 76 BPM

## 2019-05-27 VITALS — HEART RATE: 67 BPM

## 2019-05-27 RX ADMIN — MEROPENEM SCH MLS/HR: 1 INJECTION, POWDER, FOR SOLUTION INTRAVENOUS at 21:27

## 2019-05-27 RX ADMIN — ENOXAPARIN SODIUM SCH MG: 100 INJECTION SUBCUTANEOUS at 08:40

## 2019-05-27 RX ADMIN — DILTIAZEM HYDROCHLORIDE 1 MG: 30 TABLET, FILM COATED ORAL at 21:00

## 2019-05-27 RX ADMIN — POTASSIUM & SODIUM PHOSPHATES POWDER PACK 280-160-250 MG 1 MG: 280-160-250 PACK at 08:12

## 2019-05-27 RX ADMIN — MEROPENEM 1 MLS/HR: 1 INJECTION, POWDER, FOR SOLUTION INTRAVENOUS at 08:14

## 2019-05-27 RX ADMIN — SERTRALINE HYDROCHLORIDE 1 MG: 50 TABLET ORAL at 08:12

## 2019-05-27 RX ADMIN — MOMETASONE FUROATE 1 PUFF: 220 INHALANT RESPIRATORY (INHALATION) at 08:21

## 2019-05-27 RX ADMIN — HYDROCODONE BITARTRATE AND ACETAMINOPHEN 1 TAB: 5; 325 TABLET ORAL at 16:41

## 2019-05-27 RX ADMIN — DIGOXIN SCH MG: 125 TABLET ORAL at 12:32

## 2019-05-27 RX ADMIN — DILTIAZEM HYDROCHLORIDE 1 MG: 30 TABLET, FILM COATED ORAL at 08:14

## 2019-05-27 RX ADMIN — PANCRELIPASE 1 CAP: 24000; 76000; 120000 CAPSULE, DELAYED RELEASE PELLETS ORAL at 17:54

## 2019-05-27 RX ADMIN — CYCLOSPORINE 1 DROP: 0.5 EMULSION OPHTHALMIC at 21:00

## 2019-05-27 RX ADMIN — MOMETASONE FUROATE 1 PUFF: 220 INHALANT RESPIRATORY (INHALATION) at 21:26

## 2019-05-27 RX ADMIN — MOMETASONE FUROATE SCH PUFF: 220 INHALANT RESPIRATORY (INHALATION) at 08:21

## 2019-05-27 RX ADMIN — DILTIAZEM HYDROCHLORIDE SCH MG: 30 TABLET, FILM COATED ORAL at 08:14

## 2019-05-27 RX ADMIN — CYCLOSPORINE 1 DROP: 0.5 EMULSION OPHTHALMIC at 08:46

## 2019-05-27 RX ADMIN — MOMETASONE FUROATE SCH PUFF: 220 INHALANT RESPIRATORY (INHALATION) at 21:26

## 2019-05-27 RX ADMIN — POTASSIUM & SODIUM PHOSPHATES POWDER PACK 280-160-250 MG 1 MG: 280-160-250 PACK at 21:26

## 2019-05-27 RX ADMIN — DILTIAZEM HYDROCHLORIDE SCH MG: 30 TABLET, FILM COATED ORAL at 12:33

## 2019-05-27 RX ADMIN — SERTRALINE HYDROCHLORIDE SCH MG: 50 TABLET ORAL at 08:12

## 2019-05-27 RX ADMIN — MEROPENEM SCH MLS/HR: 1 INJECTION, POWDER, FOR SOLUTION INTRAVENOUS at 08:14

## 2019-05-27 RX ADMIN — DILTIAZEM HYDROCHLORIDE SCH MG: 30 TABLET, FILM COATED ORAL at 21:00

## 2019-05-27 RX ADMIN — MEROPENEM 1 MLS/HR: 1 INJECTION, POWDER, FOR SOLUTION INTRAVENOUS at 21:27

## 2019-05-27 RX ADMIN — PANCRELIPASE 1 CAP: 24000; 76000; 120000 CAPSULE, DELAYED RELEASE PELLETS ORAL at 08:12

## 2019-05-27 RX ADMIN — WARFARIN SODIUM 1 MG: 5 TABLET ORAL at 16:42

## 2019-05-27 RX ADMIN — FAMOTIDINE SCH MG: 20 TABLET ORAL at 08:12

## 2019-05-27 RX ADMIN — DIGOXIN 1 MG: 125 TABLET ORAL at 12:32

## 2019-05-27 RX ADMIN — PANCRELIPASE 1 CAP: 24000; 76000; 120000 CAPSULE, DELAYED RELEASE PELLETS ORAL at 12:31

## 2019-05-27 RX ADMIN — FAMOTIDINE 1 MG: 20 TABLET ORAL at 08:12

## 2019-05-27 RX ADMIN — DILTIAZEM HYDROCHLORIDE 1 MG: 30 TABLET, FILM COATED ORAL at 12:33

## 2019-05-27 RX ADMIN — ENOXAPARIN SODIUM 1 MG: 100 INJECTION SUBCUTANEOUS at 08:40

## 2019-05-28 VITALS — RESPIRATION RATE: 18 BRPM | HEART RATE: 75 BPM | DIASTOLIC BLOOD PRESSURE: 67 MMHG | SYSTOLIC BLOOD PRESSURE: 143 MMHG

## 2019-05-28 VITALS — HEART RATE: 60 BPM

## 2019-05-28 VITALS — DIASTOLIC BLOOD PRESSURE: 57 MMHG | RESPIRATION RATE: 19 BRPM | HEART RATE: 65 BPM | SYSTOLIC BLOOD PRESSURE: 109 MMHG

## 2019-05-28 VITALS — RESPIRATION RATE: 20 BRPM | SYSTOLIC BLOOD PRESSURE: 136 MMHG | HEART RATE: 60 BPM | DIASTOLIC BLOOD PRESSURE: 62 MMHG

## 2019-05-28 VITALS — SYSTOLIC BLOOD PRESSURE: 136 MMHG | DIASTOLIC BLOOD PRESSURE: 64 MMHG | HEART RATE: 60 BPM | RESPIRATION RATE: 20 BRPM

## 2019-05-28 VITALS — HEART RATE: 60 BPM | DIASTOLIC BLOOD PRESSURE: 69 MMHG | RESPIRATION RATE: 18 BRPM | SYSTOLIC BLOOD PRESSURE: 149 MMHG

## 2019-05-28 VITALS — DIASTOLIC BLOOD PRESSURE: 77 MMHG | SYSTOLIC BLOOD PRESSURE: 135 MMHG | RESPIRATION RATE: 18 BRPM | HEART RATE: 74 BPM

## 2019-05-28 VITALS — HEART RATE: 62 BPM

## 2019-05-28 LAB
INR: 1.32
PROTIME: 16.5 SEC (ref 11.9–14.9)
PT RATIO: 1.3

## 2019-05-28 RX ADMIN — PANCRELIPASE 1 CAP: 24000; 76000; 120000 CAPSULE, DELAYED RELEASE PELLETS ORAL at 12:11

## 2019-05-28 RX ADMIN — CYCLOSPORINE 1 DROP: 0.5 EMULSION OPHTHALMIC at 12:11

## 2019-05-28 RX ADMIN — DILTIAZEM HYDROCHLORIDE SCH MG: 30 TABLET, FILM COATED ORAL at 21:00

## 2019-05-28 RX ADMIN — POTASSIUM & SODIUM PHOSPHATES POWDER PACK 280-160-250 MG 1 MG: 280-160-250 PACK at 08:29

## 2019-05-28 RX ADMIN — MEROPENEM 1 MLS/HR: 1 INJECTION, POWDER, FOR SOLUTION INTRAVENOUS at 08:30

## 2019-05-28 RX ADMIN — PANCRELIPASE 1 CAP: 24000; 76000; 120000 CAPSULE, DELAYED RELEASE PELLETS ORAL at 08:29

## 2019-05-28 RX ADMIN — DIGOXIN SCH MG: 125 TABLET ORAL at 12:13

## 2019-05-28 RX ADMIN — MOMETASONE FUROATE SCH PUFF: 220 INHALANT RESPIRATORY (INHALATION) at 20:00

## 2019-05-28 RX ADMIN — SERTRALINE HYDROCHLORIDE SCH MG: 50 TABLET ORAL at 08:30

## 2019-05-28 RX ADMIN — FAMOTIDINE 1 MG: 20 TABLET ORAL at 08:29

## 2019-05-28 RX ADMIN — POTASSIUM & SODIUM PHOSPHATES POWDER PACK 280-160-250 MG 1 MG: 280-160-250 PACK at 21:10

## 2019-05-28 RX ADMIN — DILTIAZEM HYDROCHLORIDE 1 MG: 30 TABLET, FILM COATED ORAL at 21:00

## 2019-05-28 RX ADMIN — DILTIAZEM HYDROCHLORIDE SCH MG: 30 TABLET, FILM COATED ORAL at 08:29

## 2019-05-28 RX ADMIN — ENOXAPARIN SODIUM SCH MG: 100 INJECTION SUBCUTANEOUS at 08:54

## 2019-05-28 RX ADMIN — CYCLOSPORINE 1 DROP: 0.5 EMULSION OPHTHALMIC at 21:00

## 2019-05-28 RX ADMIN — DILTIAZEM HYDROCHLORIDE SCH MG: 30 TABLET, FILM COATED ORAL at 12:12

## 2019-05-28 RX ADMIN — MEROPENEM 1 MLS/HR: 1 INJECTION, POWDER, FOR SOLUTION INTRAVENOUS at 21:10

## 2019-05-28 RX ADMIN — DILTIAZEM HYDROCHLORIDE 1 MG: 30 TABLET, FILM COATED ORAL at 08:29

## 2019-05-28 RX ADMIN — MOMETASONE FUROATE SCH PUFF: 220 INHALANT RESPIRATORY (INHALATION) at 12:11

## 2019-05-28 RX ADMIN — FAMOTIDINE SCH MG: 20 TABLET ORAL at 08:29

## 2019-05-28 RX ADMIN — MOMETASONE FUROATE 1 PUFF: 220 INHALANT RESPIRATORY (INHALATION) at 12:11

## 2019-05-28 RX ADMIN — PANCRELIPASE 1 CAP: 24000; 76000; 120000 CAPSULE, DELAYED RELEASE PELLETS ORAL at 17:14

## 2019-05-28 RX ADMIN — MEROPENEM SCH MLS/HR: 1 INJECTION, POWDER, FOR SOLUTION INTRAVENOUS at 21:10

## 2019-05-28 RX ADMIN — DILTIAZEM HYDROCHLORIDE 1 MG: 30 TABLET, FILM COATED ORAL at 12:12

## 2019-05-28 RX ADMIN — MEROPENEM SCH MLS/HR: 1 INJECTION, POWDER, FOR SOLUTION INTRAVENOUS at 08:30

## 2019-05-28 RX ADMIN — DIGOXIN 1 MG: 125 TABLET ORAL at 12:13

## 2019-05-28 RX ADMIN — ENOXAPARIN SODIUM 1 MG: 100 INJECTION SUBCUTANEOUS at 08:54

## 2019-05-28 RX ADMIN — SERTRALINE HYDROCHLORIDE 1 MG: 50 TABLET ORAL at 08:30

## 2019-05-28 RX ADMIN — MOMETASONE FUROATE 1 PUFF: 220 INHALANT RESPIRATORY (INHALATION) at 20:00

## 2019-05-28 RX ADMIN — WARFARIN SODIUM 1 MG: 5 TABLET ORAL at 17:14

## 2019-05-29 VITALS — RESPIRATION RATE: 20 BRPM | DIASTOLIC BLOOD PRESSURE: 58 MMHG | HEART RATE: 68 BPM | SYSTOLIC BLOOD PRESSURE: 126 MMHG

## 2019-05-29 VITALS — HEART RATE: 60 BPM

## 2019-05-29 VITALS — DIASTOLIC BLOOD PRESSURE: 60 MMHG | HEART RATE: 59 BPM | RESPIRATION RATE: 20 BRPM | SYSTOLIC BLOOD PRESSURE: 129 MMHG

## 2019-05-29 VITALS — HEART RATE: 61 BPM

## 2019-05-29 VITALS — HEART RATE: 60 BPM | SYSTOLIC BLOOD PRESSURE: 116 MMHG | RESPIRATION RATE: 20 BRPM | DIASTOLIC BLOOD PRESSURE: 55 MMHG

## 2019-05-29 VITALS — HEART RATE: 60 BPM | RESPIRATION RATE: 18 BRPM | SYSTOLIC BLOOD PRESSURE: 134 MMHG | DIASTOLIC BLOOD PRESSURE: 63 MMHG

## 2019-05-29 VITALS — RESPIRATION RATE: 17 BRPM | HEART RATE: 56 BPM | SYSTOLIC BLOOD PRESSURE: 118 MMHG | DIASTOLIC BLOOD PRESSURE: 56 MMHG

## 2019-05-29 VITALS — RESPIRATION RATE: 18 BRPM | HEART RATE: 71 BPM | SYSTOLIC BLOOD PRESSURE: 149 MMHG | DIASTOLIC BLOOD PRESSURE: 72 MMHG

## 2019-05-29 VITALS — HEART RATE: 67 BPM

## 2019-05-29 VITALS — HEART RATE: 66 BPM

## 2019-05-29 LAB
ADD MAN DIFF?: NO
ANION GAP: 2 (ref 5–13)
BASOPHIL #: 0.1 10^3/UL (ref 0–0.1)
BASOPHILS %: 1.2 % (ref 0–2)
BLOOD UREA NITROGEN: 19 MG/DL (ref 7–20)
CALCIUM: 10.1 MG/DL (ref 8.4–10.2)
CARBON DIOXIDE: 30 MMOL/L (ref 21–31)
CHLORIDE: 108 MMOL/L (ref 97–110)
CREATININE: 0.45 MG/DL (ref 0.44–1)
EOSINOPHILS #: 0.3 10^3/UL (ref 0–0.5)
EOSINOPHILS %: 4.4 % (ref 0–7)
GLUCOSE: 91 MG/DL (ref 70–220)
HEMATOCRIT: 37.5 % (ref 37–47)
HEMOGLOBIN: 11.9 G/DL (ref 12–16)
IMMATURE GRANS #M: 0.03 10^3/UL (ref 0–0.03)
IMMATURE GRANS % (M): 0.5 % (ref 0–0.43)
INR: 1.37
LYMPHOCYTES #: 1.8 10^3/UL (ref 0.8–2.9)
LYMPHOCYTES %: 31.7 % (ref 15–51)
MAGNESIUM: 2.6 MG/DL (ref 1.7–2.5)
MEAN CORPUSCULAR HEMOGLOBIN: 28.7 PG (ref 29–33)
MEAN CORPUSCULAR HGB CONC: 31.7 G/DL (ref 32–37)
MEAN CORPUSCULAR VOLUME: 90.6 FL (ref 82–101)
MEAN PLATELET VOLUME: 10.5 FL (ref 7.4–10.4)
MONOCYTE #: 0.5 10^3/UL (ref 0.3–0.9)
MONOCYTES %: 8.8 % (ref 0–11)
NEUTROPHIL #: 3 10^3/UL (ref 1.6–7.5)
NEUTROPHILS %: 53.4 % (ref 39–77)
NUCLEATED RED BLOOD CELLS #: 0 10^3/UL (ref 0–0)
NUCLEATED RED BLOOD CELLS%: 0 /100WBC (ref 0–0)
PHOSPHORUS: 3.2 MG/DL (ref 2.5–4.9)
PLATELET COUNT: 261 10^3/UL (ref 140–415)
POTASSIUM: 4.4 MMOL/L (ref 3.5–5.1)
PROTIME: 17 SEC (ref 11.9–14.9)
PT RATIO: 1.3
RED BLOOD COUNT: 4.14 10^6/UL (ref 4.2–5.4)
RED CELL DISTRIBUTION WIDTH: 14.1 % (ref 11.5–14.5)
SODIUM: 140 MMOL/L (ref 135–144)
WHITE BLOOD COUNT: 5.7 10^3/UL (ref 4.8–10.8)

## 2019-05-29 RX ADMIN — PANCRELIPASE 1 CAP: 24000; 76000; 120000 CAPSULE, DELAYED RELEASE PELLETS ORAL at 13:04

## 2019-05-29 RX ADMIN — DILTIAZEM HYDROCHLORIDE 1 MG: 30 TABLET, FILM COATED ORAL at 20:46

## 2019-05-29 RX ADMIN — CYCLOSPORINE 1 DROP: 0.5 EMULSION OPHTHALMIC at 20:46

## 2019-05-29 RX ADMIN — DILTIAZEM HYDROCHLORIDE SCH MG: 30 TABLET, FILM COATED ORAL at 20:46

## 2019-05-29 RX ADMIN — DILTIAZEM HYDROCHLORIDE SCH MG: 30 TABLET, FILM COATED ORAL at 09:03

## 2019-05-29 RX ADMIN — MOMETASONE FUROATE 1 PUFF: 220 INHALANT RESPIRATORY (INHALATION) at 20:46

## 2019-05-29 RX ADMIN — MEROPENEM 1 MLS/HR: 1 INJECTION, POWDER, FOR SOLUTION INTRAVENOUS at 20:47

## 2019-05-29 RX ADMIN — MOMETASONE FUROATE SCH PUFF: 220 INHALANT RESPIRATORY (INHALATION) at 20:46

## 2019-05-29 RX ADMIN — PANCRELIPASE 1 CAP: 24000; 76000; 120000 CAPSULE, DELAYED RELEASE PELLETS ORAL at 17:13

## 2019-05-29 RX ADMIN — WARFARIN SODIUM 1 MG: 5 TABLET ORAL at 17:57

## 2019-05-29 RX ADMIN — ENOXAPARIN SODIUM SCH MG: 100 INJECTION SUBCUTANEOUS at 08:48

## 2019-05-29 RX ADMIN — MEROPENEM 1 MLS/HR: 1 INJECTION, POWDER, FOR SOLUTION INTRAVENOUS at 08:33

## 2019-05-29 RX ADMIN — DILTIAZEM HYDROCHLORIDE 1 MG: 30 TABLET, FILM COATED ORAL at 13:29

## 2019-05-29 RX ADMIN — MOMETASONE FUROATE SCH PUFF: 220 INHALANT RESPIRATORY (INHALATION) at 09:05

## 2019-05-29 RX ADMIN — PANCRELIPASE 1 CAP: 24000; 76000; 120000 CAPSULE, DELAYED RELEASE PELLETS ORAL at 08:31

## 2019-05-29 RX ADMIN — POTASSIUM & SODIUM PHOSPHATES POWDER PACK 280-160-250 MG 1 MG: 280-160-250 PACK at 20:47

## 2019-05-29 RX ADMIN — DILTIAZEM HYDROCHLORIDE 1 MG: 30 TABLET, FILM COATED ORAL at 09:03

## 2019-05-29 RX ADMIN — SERTRALINE HYDROCHLORIDE SCH MG: 50 TABLET ORAL at 09:04

## 2019-05-29 RX ADMIN — POTASSIUM & SODIUM PHOSPHATES POWDER PACK 280-160-250 MG 1 MG: 280-160-250 PACK at 09:04

## 2019-05-29 RX ADMIN — SERTRALINE HYDROCHLORIDE 1 MG: 50 TABLET ORAL at 09:04

## 2019-05-29 RX ADMIN — FAMOTIDINE 1 MG: 20 TABLET ORAL at 09:03

## 2019-05-29 RX ADMIN — DIGOXIN 1 MG: 125 TABLET ORAL at 13:29

## 2019-05-29 RX ADMIN — MOMETASONE FUROATE 1 PUFF: 220 INHALANT RESPIRATORY (INHALATION) at 09:05

## 2019-05-29 RX ADMIN — CYCLOSPORINE 1 DROP: 0.5 EMULSION OPHTHALMIC at 09:08

## 2019-05-29 RX ADMIN — ENOXAPARIN SODIUM 1 MG: 100 INJECTION SUBCUTANEOUS at 08:48

## 2019-05-29 RX ADMIN — MEROPENEM SCH MLS/HR: 1 INJECTION, POWDER, FOR SOLUTION INTRAVENOUS at 08:33

## 2019-05-29 RX ADMIN — DIGOXIN SCH MG: 125 TABLET ORAL at 13:29

## 2019-05-29 RX ADMIN — MEROPENEM SCH MLS/HR: 1 INJECTION, POWDER, FOR SOLUTION INTRAVENOUS at 20:47

## 2019-05-29 RX ADMIN — DILTIAZEM HYDROCHLORIDE SCH MG: 30 TABLET, FILM COATED ORAL at 13:29

## 2019-05-29 RX ADMIN — FAMOTIDINE SCH MG: 20 TABLET ORAL at 09:03

## 2019-05-30 VITALS — HEART RATE: 60 BPM | RESPIRATION RATE: 17 BRPM | SYSTOLIC BLOOD PRESSURE: 122 MMHG | DIASTOLIC BLOOD PRESSURE: 61 MMHG

## 2019-05-30 VITALS — DIASTOLIC BLOOD PRESSURE: 64 MMHG | SYSTOLIC BLOOD PRESSURE: 136 MMHG | HEART RATE: 60 BPM | RESPIRATION RATE: 20 BRPM

## 2019-05-30 VITALS — RESPIRATION RATE: 17 BRPM | HEART RATE: 59 BPM | DIASTOLIC BLOOD PRESSURE: 59 MMHG | SYSTOLIC BLOOD PRESSURE: 122 MMHG

## 2019-05-30 VITALS — HEART RATE: 60 BPM | DIASTOLIC BLOOD PRESSURE: 61 MMHG | RESPIRATION RATE: 17 BRPM | SYSTOLIC BLOOD PRESSURE: 125 MMHG

## 2019-05-30 VITALS — HEART RATE: 60 BPM

## 2019-05-30 VITALS — HEART RATE: 61 BPM | SYSTOLIC BLOOD PRESSURE: 134 MMHG | DIASTOLIC BLOOD PRESSURE: 61 MMHG | RESPIRATION RATE: 20 BRPM

## 2019-05-30 VITALS — DIASTOLIC BLOOD PRESSURE: 60 MMHG | RESPIRATION RATE: 20 BRPM | SYSTOLIC BLOOD PRESSURE: 130 MMHG | HEART RATE: 59 BPM

## 2019-05-30 VITALS — RESPIRATION RATE: 17 BRPM | SYSTOLIC BLOOD PRESSURE: 121 MMHG | HEART RATE: 61 BPM | DIASTOLIC BLOOD PRESSURE: 66 MMHG

## 2019-05-30 VITALS — HEART RATE: 61 BPM

## 2019-05-30 LAB
ADD MAN DIFF?: NO
ANION GAP: 2 (ref 5–13)
BASOPHIL #: 0.1 10^3/UL (ref 0–0.1)
BASOPHILS %: 1.3 % (ref 0–2)
BLOOD UREA NITROGEN: 15 MG/DL (ref 7–20)
CALCIUM: 9.8 MG/DL (ref 8.4–10.2)
CARBON DIOXIDE: 31 MMOL/L (ref 21–31)
CHLORIDE: 108 MMOL/L (ref 97–110)
CREATININE: 0.45 MG/DL (ref 0.44–1)
EOSINOPHILS #: 0.2 10^3/UL (ref 0–0.5)
EOSINOPHILS %: 4.1 % (ref 0–7)
GLUCOSE: 86 MG/DL (ref 70–220)
HEMATOCRIT: 35.1 % (ref 37–47)
HEMOGLOBIN: 10.9 G/DL (ref 12–16)
IMMATURE GRANS #M: 0.03 10^3/UL (ref 0–0.03)
IMMATURE GRANS % (M): 0.6 % (ref 0–0.43)
INR: 1.37
LYMPHOCYTES #: 1.5 10^3/UL (ref 0.8–2.9)
LYMPHOCYTES %: 32.5 % (ref 15–51)
MEAN CORPUSCULAR HEMOGLOBIN: 28.3 PG (ref 29–33)
MEAN CORPUSCULAR HGB CONC: 31.1 G/DL (ref 32–37)
MEAN CORPUSCULAR VOLUME: 91.2 FL (ref 82–101)
MEAN PLATELET VOLUME: 10.7 FL (ref 7.4–10.4)
MONOCYTE #: 0.5 10^3/UL (ref 0.3–0.9)
MONOCYTES %: 10.7 % (ref 0–11)
NEUTROPHIL #: 2.4 10^3/UL (ref 1.6–7.5)
NEUTROPHILS %: 50.8 % (ref 39–77)
NUCLEATED RED BLOOD CELLS #: 0 10^3/UL (ref 0–0)
NUCLEATED RED BLOOD CELLS%: 0 /100WBC (ref 0–0)
PLATELET COUNT: 259 10^3/UL (ref 140–415)
POTASSIUM: 4.2 MMOL/L (ref 3.5–5.1)
PROTIME: 17 SEC (ref 11.9–14.9)
PT RATIO: 1.3
RED BLOOD COUNT: 3.85 10^6/UL (ref 4.2–5.4)
RED CELL DISTRIBUTION WIDTH: 14.1 % (ref 11.5–14.5)
SODIUM: 141 MMOL/L (ref 135–144)
WHITE BLOOD COUNT: 4.7 10^3/UL (ref 4.8–10.8)

## 2019-05-30 RX ADMIN — DILTIAZEM HYDROCHLORIDE SCH MG: 30 TABLET, FILM COATED ORAL at 08:59

## 2019-05-30 RX ADMIN — MOMETASONE FUROATE 1 PUFF: 220 INHALANT RESPIRATORY (INHALATION) at 23:17

## 2019-05-30 RX ADMIN — MOMETASONE FUROATE 1 PUFF: 220 INHALANT RESPIRATORY (INHALATION) at 10:46

## 2019-05-30 RX ADMIN — SERTRALINE HYDROCHLORIDE 1 MG: 50 TABLET ORAL at 08:59

## 2019-05-30 RX ADMIN — PANCRELIPASE 1 CAP: 24000; 76000; 120000 CAPSULE, DELAYED RELEASE PELLETS ORAL at 08:58

## 2019-05-30 RX ADMIN — DILTIAZEM HYDROCHLORIDE SCH MG: 30 TABLET, FILM COATED ORAL at 11:56

## 2019-05-30 RX ADMIN — CYCLOSPORINE 1 DROP: 0.5 EMULSION OPHTHALMIC at 20:40

## 2019-05-30 RX ADMIN — FAMOTIDINE SCH MG: 20 TABLET ORAL at 08:59

## 2019-05-30 RX ADMIN — CYCLOSPORINE 1 DROP: 0.5 EMULSION OPHTHALMIC at 10:46

## 2019-05-30 RX ADMIN — LEVALBUTEROL HYDROCHLORIDE 1 MG: 0.63 SOLUTION RESPIRATORY (INHALATION) at 13:03

## 2019-05-30 RX ADMIN — WARFARIN SODIUM 1 MG: 5 TABLET ORAL at 17:26

## 2019-05-30 RX ADMIN — LEVALBUTEROL HYDROCHLORIDE PRN MG: 0.63 SOLUTION RESPIRATORY (INHALATION) at 13:03

## 2019-05-30 RX ADMIN — DIGOXIN 1 MG: 125 TABLET ORAL at 11:55

## 2019-05-30 RX ADMIN — SERTRALINE HYDROCHLORIDE SCH MG: 50 TABLET ORAL at 08:59

## 2019-05-30 RX ADMIN — DILTIAZEM HYDROCHLORIDE 1 MG: 30 TABLET, FILM COATED ORAL at 11:56

## 2019-05-30 RX ADMIN — PANCRELIPASE 1 CAP: 24000; 76000; 120000 CAPSULE, DELAYED RELEASE PELLETS ORAL at 11:53

## 2019-05-30 RX ADMIN — POTASSIUM & SODIUM PHOSPHATES POWDER PACK 280-160-250 MG 1 MG: 280-160-250 PACK at 08:59

## 2019-05-30 RX ADMIN — POTASSIUM & SODIUM PHOSPHATES POWDER PACK 280-160-250 MG 1 MG: 280-160-250 PACK at 20:40

## 2019-05-30 RX ADMIN — ENOXAPARIN SODIUM SCH MG: 100 INJECTION SUBCUTANEOUS at 09:09

## 2019-05-30 RX ADMIN — ENOXAPARIN SODIUM 1 MG: 100 INJECTION SUBCUTANEOUS at 09:09

## 2019-05-30 RX ADMIN — MOMETASONE FUROATE SCH PUFF: 220 INHALANT RESPIRATORY (INHALATION) at 23:17

## 2019-05-30 RX ADMIN — MORPHINE SULFATE 1 MG: 2 INJECTION, SOLUTION INTRAMUSCULAR; INTRAVENOUS at 13:00

## 2019-05-30 RX ADMIN — DILTIAZEM HYDROCHLORIDE 1 MG: 30 TABLET, FILM COATED ORAL at 08:59

## 2019-05-30 RX ADMIN — DILTIAZEM HYDROCHLORIDE 1 MG: 30 TABLET, FILM COATED ORAL at 20:42

## 2019-05-30 RX ADMIN — DIGOXIN SCH MG: 125 TABLET ORAL at 11:55

## 2019-05-30 RX ADMIN — FAMOTIDINE 1 MG: 20 TABLET ORAL at 08:59

## 2019-05-30 RX ADMIN — PANCRELIPASE 1 CAP: 24000; 76000; 120000 CAPSULE, DELAYED RELEASE PELLETS ORAL at 17:26

## 2019-05-30 RX ADMIN — MOMETASONE FUROATE SCH PUFF: 220 INHALANT RESPIRATORY (INHALATION) at 10:46

## 2019-05-30 RX ADMIN — DILTIAZEM HYDROCHLORIDE SCH MG: 30 TABLET, FILM COATED ORAL at 20:42

## 2019-05-31 VITALS — HEART RATE: 60 BPM | DIASTOLIC BLOOD PRESSURE: 63 MMHG | SYSTOLIC BLOOD PRESSURE: 131 MMHG | RESPIRATION RATE: 18 BRPM

## 2019-05-31 VITALS — HEART RATE: 60 BPM | SYSTOLIC BLOOD PRESSURE: 135 MMHG | DIASTOLIC BLOOD PRESSURE: 61 MMHG | RESPIRATION RATE: 19 BRPM

## 2019-05-31 VITALS — SYSTOLIC BLOOD PRESSURE: 124 MMHG | HEART RATE: 60 BPM | DIASTOLIC BLOOD PRESSURE: 65 MMHG | RESPIRATION RATE: 18 BRPM

## 2019-05-31 VITALS — HEART RATE: 63 BPM | DIASTOLIC BLOOD PRESSURE: 69 MMHG | RESPIRATION RATE: 19 BRPM | SYSTOLIC BLOOD PRESSURE: 131 MMHG

## 2019-05-31 VITALS — SYSTOLIC BLOOD PRESSURE: 134 MMHG | DIASTOLIC BLOOD PRESSURE: 63 MMHG | HEART RATE: 60 BPM | RESPIRATION RATE: 18 BRPM

## 2019-05-31 VITALS — HEART RATE: 60 BPM

## 2019-05-31 LAB
ADD MAN DIFF?: NO
ALANINE AMINOTRANSFERASE: 27 IU/L (ref 13–69)
ALBUMIN/GLOBULIN RATIO: 1.32
ALBUMIN: 3.3 G/DL (ref 3.3–4.9)
ALKALINE PHOSPHATASE: 62 IU/L (ref 42–121)
ANION GAP: 3 (ref 5–13)
ASPARTATE AMINO TRANSFERASE: 22 IU/L (ref 15–46)
BASOPHIL #: 0.1 10^3/UL (ref 0–0.1)
BASOPHILS %: 1.1 % (ref 0–2)
BILIRUBIN,DIRECT: 0 MG/DL (ref 0–0.2)
BILIRUBIN,TOTAL: 0.3 MG/DL (ref 0.2–1.3)
BLOOD UREA NITROGEN: 13 MG/DL (ref 7–20)
CALCIUM: 10.2 MG/DL (ref 8.4–10.2)
CARBON DIOXIDE: 30 MMOL/L (ref 21–31)
CHLORIDE: 108 MMOL/L (ref 97–110)
CREATININE: 0.46 MG/DL (ref 0.44–1)
EOSINOPHILS #: 0.2 10^3/UL (ref 0–0.5)
EOSINOPHILS %: 3.4 % (ref 0–7)
GLOBULIN: 2.5 G/DL (ref 1.3–3.2)
GLUCOSE: 87 MG/DL (ref 70–220)
HEMATOCRIT: 36.6 % (ref 37–47)
HEMOGLOBIN: 11.4 G/DL (ref 12–16)
IMMATURE GRANS #M: 0.03 10^3/UL (ref 0–0.03)
IMMATURE GRANS % (M): 0.5 % (ref 0–0.43)
INR: 1.38
LYMPHOCYTES #: 1.9 10^3/UL (ref 0.8–2.9)
LYMPHOCYTES %: 34.2 % (ref 15–51)
MAGNESIUM: 2.4 MG/DL (ref 1.7–2.5)
MEAN CORPUSCULAR HEMOGLOBIN: 28.2 PG (ref 29–33)
MEAN CORPUSCULAR HGB CONC: 31.1 G/DL (ref 32–37)
MEAN CORPUSCULAR VOLUME: 90.6 FL (ref 82–101)
MEAN PLATELET VOLUME: 10.5 FL (ref 7.4–10.4)
MONOCYTE #: 0.5 10^3/UL (ref 0.3–0.9)
MONOCYTES %: 9.4 % (ref 0–11)
NEUTROPHIL #: 2.9 10^3/UL (ref 1.6–7.5)
NEUTROPHILS %: 51.4 % (ref 39–77)
NUCLEATED RED BLOOD CELLS #: 0 10^3/UL (ref 0–0)
NUCLEATED RED BLOOD CELLS%: 0 /100WBC (ref 0–0)
PLATELET COUNT: 278 10^3/UL (ref 140–415)
POTASSIUM: 4.4 MMOL/L (ref 3.5–5.1)
PROTIME: 17.1 SEC (ref 11.9–14.9)
PT RATIO: 1.3
RED BLOOD COUNT: 4.04 10^6/UL (ref 4.2–5.4)
RED CELL DISTRIBUTION WIDTH: 14 % (ref 11.5–14.5)
SODIUM: 141 MMOL/L (ref 135–144)
TOTAL PROTEIN: 5.8 G/DL (ref 6.1–8.1)
WHITE BLOOD COUNT: 5.6 10^3/UL (ref 4.8–10.8)

## 2019-05-31 RX ADMIN — FAMOTIDINE SCH MG: 20 TABLET ORAL at 07:29

## 2019-05-31 RX ADMIN — PANCRELIPASE 1 CAP: 24000; 76000; 120000 CAPSULE, DELAYED RELEASE PELLETS ORAL at 07:27

## 2019-05-31 RX ADMIN — DILTIAZEM HYDROCHLORIDE 1 MG: 30 TABLET, FILM COATED ORAL at 11:54

## 2019-05-31 RX ADMIN — MOMETASONE FUROATE SCH PUFF: 220 INHALANT RESPIRATORY (INHALATION) at 07:31

## 2019-05-31 RX ADMIN — MOMETASONE FUROATE 1 PUFF: 220 INHALANT RESPIRATORY (INHALATION) at 07:31

## 2019-05-31 RX ADMIN — POTASSIUM & SODIUM PHOSPHATES POWDER PACK 280-160-250 MG 1 MG: 280-160-250 PACK at 07:28

## 2019-05-31 RX ADMIN — ENOXAPARIN SODIUM 1 MG: 100 INJECTION SUBCUTANEOUS at 08:04

## 2019-05-31 RX ADMIN — DIGOXIN 1 MG: 125 TABLET ORAL at 11:53

## 2019-05-31 RX ADMIN — DILTIAZEM HYDROCHLORIDE SCH MG: 30 TABLET, FILM COATED ORAL at 11:54

## 2019-05-31 RX ADMIN — DIGOXIN SCH MG: 125 TABLET ORAL at 11:53

## 2019-05-31 RX ADMIN — DILTIAZEM HYDROCHLORIDE 1 MG: 30 TABLET, FILM COATED ORAL at 07:31

## 2019-05-31 RX ADMIN — PANCRELIPASE 1 CAP: 24000; 76000; 120000 CAPSULE, DELAYED RELEASE PELLETS ORAL at 16:38

## 2019-05-31 RX ADMIN — WARFARIN SODIUM 1 MG: 5 TABLET ORAL at 16:38

## 2019-05-31 RX ADMIN — PANCRELIPASE 1 CAP: 24000; 76000; 120000 CAPSULE, DELAYED RELEASE PELLETS ORAL at 11:54

## 2019-05-31 RX ADMIN — FAMOTIDINE 1 MG: 20 TABLET ORAL at 07:29

## 2019-05-31 RX ADMIN — SERTRALINE HYDROCHLORIDE 1 MG: 50 TABLET ORAL at 07:30

## 2019-05-31 RX ADMIN — SERTRALINE HYDROCHLORIDE SCH MG: 50 TABLET ORAL at 07:30

## 2019-05-31 RX ADMIN — DILTIAZEM HYDROCHLORIDE SCH MG: 30 TABLET, FILM COATED ORAL at 07:31

## 2019-05-31 RX ADMIN — ENOXAPARIN SODIUM SCH MG: 100 INJECTION SUBCUTANEOUS at 08:04

## 2019-05-31 RX ADMIN — CYCLOSPORINE 1 DROP: 0.5 EMULSION OPHTHALMIC at 07:29

## 2019-07-15 ENCOUNTER — OFFICE (OUTPATIENT)
Dept: URBAN - METROPOLITAN AREA CLINIC 57 | Facility: CLINIC | Age: 83
End: 2019-07-15

## 2019-07-15 VITALS
TEMPERATURE: 97.7 F | RESPIRATION RATE: 16 BRPM | WEIGHT: 106 LBS | DIASTOLIC BLOOD PRESSURE: 74 MMHG | HEIGHT: 64 IN | HEART RATE: 67 BPM | SYSTOLIC BLOOD PRESSURE: 141 MMHG

## 2019-07-15 DIAGNOSIS — K46.9 HERNIA OF ABDOMINAL CAVITY: ICD-10-CM

## 2019-07-15 DIAGNOSIS — K86.81 EXOCRINE PANCREATIC INSUFFICIENCY: ICD-10-CM

## 2019-07-15 PROCEDURE — 99213 OFFICE O/P EST LOW 20 MIN: CPT | Performed by: INTERNAL MEDICINE

## 2019-07-15 NOTE — SERVICEHPINOTES
After the patient's last visit she was put on Creon. She has been taking 1 with every meal or snack. Of note she previously had pancreatitis. With her Creon usage her diarrhea has improved. At times she still has diarrhea. However she does not adjust her dose based on the medial size. Her stool for fat and pancreatic elastase were normal. Since seeing me last she had a left groin hernia repair. She currently has a right groin hernia which is increased lately. She notes some discomfort with that. In fact currently at time she gets constipated. Because of this she was given MiraLAX by Dr. Ann.

## 2019-08-27 ENCOUNTER — HOSPITAL ENCOUNTER (INPATIENT)
Dept: HOSPITAL 91 - SDS | Age: 84
LOS: 8 days | Discharge: HOME HEALTH | DRG: 351 | End: 2019-09-04
Payer: COMMERCIAL

## 2019-08-27 ENCOUNTER — HOSPITAL ENCOUNTER (INPATIENT)
Dept: HOSPITAL 10 - SDS | Age: 84
LOS: 8 days | Discharge: HOME HEALTH | DRG: 351 | End: 2019-09-04
Admitting: INTERNAL MEDICINE
Payer: COMMERCIAL

## 2019-08-27 VITALS — RESPIRATION RATE: 18 BRPM | SYSTOLIC BLOOD PRESSURE: 111 MMHG | DIASTOLIC BLOOD PRESSURE: 56 MMHG | HEART RATE: 60 BPM

## 2019-08-27 VITALS — DIASTOLIC BLOOD PRESSURE: 51 MMHG | HEART RATE: 60 BPM | SYSTOLIC BLOOD PRESSURE: 113 MMHG | RESPIRATION RATE: 14 BRPM

## 2019-08-27 VITALS — SYSTOLIC BLOOD PRESSURE: 158 MMHG | RESPIRATION RATE: 15 BRPM | DIASTOLIC BLOOD PRESSURE: 52 MMHG | HEART RATE: 60 BPM

## 2019-08-27 VITALS — HEART RATE: 60 BPM | RESPIRATION RATE: 19 BRPM | DIASTOLIC BLOOD PRESSURE: 45 MMHG | SYSTOLIC BLOOD PRESSURE: 119 MMHG

## 2019-08-27 VITALS — SYSTOLIC BLOOD PRESSURE: 120 MMHG | RESPIRATION RATE: 25 BRPM | DIASTOLIC BLOOD PRESSURE: 53 MMHG | HEART RATE: 60 BPM

## 2019-08-27 VITALS — HEART RATE: 60 BPM | SYSTOLIC BLOOD PRESSURE: 174 MMHG | DIASTOLIC BLOOD PRESSURE: 71 MMHG | RESPIRATION RATE: 18 BRPM

## 2019-08-27 VITALS — SYSTOLIC BLOOD PRESSURE: 159 MMHG | HEART RATE: 60 BPM | DIASTOLIC BLOOD PRESSURE: 60 MMHG | RESPIRATION RATE: 15 BRPM

## 2019-08-27 VITALS — HEART RATE: 60 BPM | RESPIRATION RATE: 18 BRPM | SYSTOLIC BLOOD PRESSURE: 123 MMHG | DIASTOLIC BLOOD PRESSURE: 56 MMHG

## 2019-08-27 VITALS — DIASTOLIC BLOOD PRESSURE: 49 MMHG | SYSTOLIC BLOOD PRESSURE: 106 MMHG | RESPIRATION RATE: 22 BRPM | HEART RATE: 60 BPM

## 2019-08-27 VITALS — RESPIRATION RATE: 18 BRPM | HEART RATE: 60 BPM | DIASTOLIC BLOOD PRESSURE: 54 MMHG | SYSTOLIC BLOOD PRESSURE: 120 MMHG

## 2019-08-27 VITALS — DIASTOLIC BLOOD PRESSURE: 45 MMHG | HEART RATE: 60 BPM | SYSTOLIC BLOOD PRESSURE: 120 MMHG | RESPIRATION RATE: 19 BRPM

## 2019-08-27 VITALS — RESPIRATION RATE: 13 BRPM | HEART RATE: 60 BPM | DIASTOLIC BLOOD PRESSURE: 51 MMHG | SYSTOLIC BLOOD PRESSURE: 135 MMHG

## 2019-08-27 VITALS — HEART RATE: 60 BPM | DIASTOLIC BLOOD PRESSURE: 52 MMHG | SYSTOLIC BLOOD PRESSURE: 115 MMHG | RESPIRATION RATE: 13 BRPM

## 2019-08-27 VITALS — HEART RATE: 60 BPM | RESPIRATION RATE: 17 BRPM | SYSTOLIC BLOOD PRESSURE: 164 MMHG | DIASTOLIC BLOOD PRESSURE: 59 MMHG

## 2019-08-27 VITALS — DIASTOLIC BLOOD PRESSURE: 61 MMHG | RESPIRATION RATE: 18 BRPM | HEART RATE: 60 BPM | SYSTOLIC BLOOD PRESSURE: 155 MMHG

## 2019-08-27 VITALS — RESPIRATION RATE: 18 BRPM | SYSTOLIC BLOOD PRESSURE: 118 MMHG | HEART RATE: 60 BPM | DIASTOLIC BLOOD PRESSURE: 58 MMHG

## 2019-08-27 VITALS — SYSTOLIC BLOOD PRESSURE: 133 MMHG | DIASTOLIC BLOOD PRESSURE: 60 MMHG | RESPIRATION RATE: 18 BRPM | HEART RATE: 68 BPM

## 2019-08-27 VITALS — SYSTOLIC BLOOD PRESSURE: 142 MMHG | DIASTOLIC BLOOD PRESSURE: 82 MMHG | HEART RATE: 65 BPM | RESPIRATION RATE: 18 BRPM

## 2019-08-27 VITALS — HEART RATE: 60 BPM | DIASTOLIC BLOOD PRESSURE: 50 MMHG | SYSTOLIC BLOOD PRESSURE: 146 MMHG | RESPIRATION RATE: 11 BRPM

## 2019-08-27 VITALS — SYSTOLIC BLOOD PRESSURE: 119 MMHG | RESPIRATION RATE: 22 BRPM | HEART RATE: 60 BPM | DIASTOLIC BLOOD PRESSURE: 56 MMHG

## 2019-08-27 VITALS — DIASTOLIC BLOOD PRESSURE: 46 MMHG | RESPIRATION RATE: 11 BRPM | SYSTOLIC BLOOD PRESSURE: 127 MMHG | HEART RATE: 60 BPM

## 2019-08-27 VITALS — RESPIRATION RATE: 18 BRPM | SYSTOLIC BLOOD PRESSURE: 116 MMHG | DIASTOLIC BLOOD PRESSURE: 55 MMHG | HEART RATE: 60 BPM

## 2019-08-27 VITALS — DIASTOLIC BLOOD PRESSURE: 58 MMHG | SYSTOLIC BLOOD PRESSURE: 159 MMHG | HEART RATE: 60 BPM | RESPIRATION RATE: 14 BRPM

## 2019-08-27 VITALS — SYSTOLIC BLOOD PRESSURE: 102 MMHG | RESPIRATION RATE: 24 BRPM | HEART RATE: 60 BPM | DIASTOLIC BLOOD PRESSURE: 54 MMHG

## 2019-08-27 VITALS
HEIGHT: 63 IN | HEIGHT: 63 IN | BODY MASS INDEX: 19.41 KG/M2 | BODY MASS INDEX: 19.41 KG/M2 | WEIGHT: 109.57 LBS | WEIGHT: 109.57 LBS

## 2019-08-27 DIAGNOSIS — M35.00: ICD-10-CM

## 2019-08-27 DIAGNOSIS — F32.9: ICD-10-CM

## 2019-08-27 DIAGNOSIS — J45.998: ICD-10-CM

## 2019-08-27 DIAGNOSIS — N39.0: ICD-10-CM

## 2019-08-27 DIAGNOSIS — K43.9: Primary | ICD-10-CM

## 2019-08-27 DIAGNOSIS — I48.0: ICD-10-CM

## 2019-08-27 DIAGNOSIS — Z95.0: ICD-10-CM

## 2019-08-27 DIAGNOSIS — K41.90: ICD-10-CM

## 2019-08-27 DIAGNOSIS — Z79.01: ICD-10-CM

## 2019-08-27 DIAGNOSIS — K21.9: ICD-10-CM

## 2019-08-27 DIAGNOSIS — D64.9: ICD-10-CM

## 2019-08-27 DIAGNOSIS — J31.0: ICD-10-CM

## 2019-08-27 LAB
INR: 0.97
PARTIAL THROMBOPLASTIN TIME: 27.5 SEC (ref 23–35)
PROTIME: 13 SEC (ref 11.9–14.9)
PT RATIO: 1

## 2019-08-27 PROCEDURE — 87086 URINE CULTURE/COLONY COUNT: CPT

## 2019-08-27 PROCEDURE — 83735 ASSAY OF MAGNESIUM: CPT

## 2019-08-27 PROCEDURE — 97116 GAIT TRAINING THERAPY: CPT

## 2019-08-27 PROCEDURE — 81001 URINALYSIS AUTO W/SCOPE: CPT

## 2019-08-27 PROCEDURE — 97110 THERAPEUTIC EXERCISES: CPT

## 2019-08-27 PROCEDURE — 88302 TISSUE EXAM BY PATHOLOGIST: CPT

## 2019-08-27 PROCEDURE — 0YU70JZ SUPPLEMENT RIGHT FEMORAL REGION WITH SYNTHETIC SUBSTITUTE, OPEN APPROACH: ICD-10-PCS

## 2019-08-27 PROCEDURE — 0WUF0JZ SUPPLEMENT ABDOMINAL WALL WITH SYNTHETIC SUBSTITUTE, OPEN APPROACH: ICD-10-PCS

## 2019-08-27 PROCEDURE — 80048 BASIC METABOLIC PNL TOTAL CA: CPT

## 2019-08-27 PROCEDURE — 97530 THERAPEUTIC ACTIVITIES: CPT

## 2019-08-27 PROCEDURE — 93005 ELECTROCARDIOGRAM TRACING: CPT

## 2019-08-27 PROCEDURE — 97161 PT EVAL LOW COMPLEX 20 MIN: CPT

## 2019-08-27 PROCEDURE — C1781 MESH (IMPLANTABLE): HCPCS

## 2019-08-27 PROCEDURE — 85730 THROMBOPLASTIN TIME PARTIAL: CPT

## 2019-08-27 PROCEDURE — 85025 COMPLETE CBC W/AUTO DIFF WBC: CPT

## 2019-08-27 PROCEDURE — 80053 COMPREHEN METABOLIC PANEL: CPT

## 2019-08-27 PROCEDURE — 71045 X-RAY EXAM CHEST 1 VIEW: CPT

## 2019-08-27 PROCEDURE — 85610 PROTHROMBIN TIME: CPT

## 2019-08-27 PROCEDURE — C9113 INJ PANTOPRAZOLE SODIUM, VIA: HCPCS

## 2019-08-27 RX ADMIN — MORPHINE SULFATE 1 MG: 2 INJECTION, SOLUTION INTRAMUSCULAR; INTRAVENOUS at 21:18

## 2019-08-27 RX ADMIN — DILTIAZEM HYDROCHLORIDE 1 MG: 30 TABLET, FILM COATED ORAL at 17:49

## 2019-08-27 RX ADMIN — MORPHINE SULFATE PRN MG: 2 INJECTION, SOLUTION INTRAMUSCULAR; INTRAVENOUS at 21:18

## 2019-08-27 RX ADMIN — WARFARIN SODIUM 1 MG: 5 TABLET ORAL at 16:24

## 2019-08-27 RX ADMIN — LIDOCAINE HYDROCHLORIDE 1 ML: 10 INJECTION, SOLUTION EPIDURAL; INFILTRATION; INTRACAUDAL; PERINEURAL at 12:51

## 2019-08-27 RX ADMIN — MONTELUKAST SODIUM 1 MG: 10 TABLET, FILM COATED ORAL at 21:04

## 2019-08-27 RX ADMIN — MORPHINE SULFATE 1 MG: 2 INJECTION, SOLUTION INTRAMUSCULAR; INTRAVENOUS at 17:49

## 2019-08-27 RX ADMIN — PYRIDOXINE HYDROCHLORIDE SCH MLS/HR: 100 INJECTION, SOLUTION INTRAMUSCULAR; INTRAVENOUS at 09:00

## 2019-08-27 RX ADMIN — MOMETASONE FUROATE SCH PUFF: 220 INHALANT RESPIRATORY (INHALATION) at 21:04

## 2019-08-27 RX ADMIN — PANTOPRAZOLE SODIUM 1 MG: 40 INJECTION, POWDER, FOR SOLUTION INTRAVENOUS at 13:30

## 2019-08-27 RX ADMIN — MORPHINE SULFATE PRN MG: 2 INJECTION, SOLUTION INTRAMUSCULAR; INTRAVENOUS at 17:49

## 2019-08-27 RX ADMIN — PYRIDOXINE HYDROCHLORIDE 1 MLS/HR: 100 INJECTION, SOLUTION INTRAMUSCULAR; INTRAVENOUS at 09:00

## 2019-08-27 RX ADMIN — MONTELUKAST SODIUM SCH MG: 10 TABLET, FILM COATED ORAL at 21:04

## 2019-08-27 RX ADMIN — FENTANYL CITRATE 1 MCG: 50 INJECTION, SOLUTION INTRAMUSCULAR; INTRAVENOUS at 13:53

## 2019-08-27 RX ADMIN — PYRIDOXINE HYDROCHLORIDE 1 MLS/HR: 100 INJECTION, SOLUTION INTRAMUSCULAR; INTRAVENOUS at 21:05

## 2019-08-27 RX ADMIN — PYRIDOXINE HYDROCHLORIDE SCH MLS/HR: 100 INJECTION, SOLUTION INTRAMUSCULAR; INTRAVENOUS at 21:05

## 2019-08-27 RX ADMIN — BUPIVACAINE HYDROCHLORIDE AND EPINEPHRINE BITARTRATE 1 ML: 2.5; .005 INJECTION, SOLUTION EPIDURAL; INFILTRATION; INTRACAUDAL; PERINEURAL at 12:50

## 2019-08-27 RX ADMIN — CYCLOSPORINE 1 DROP: 0.5 EMULSION OPHTHALMIC at 21:04

## 2019-08-27 RX ADMIN — ONDANSETRON HYDROCHLORIDE 1 MG: 2 INJECTION, SOLUTION INTRAMUSCULAR; INTRAVENOUS at 13:52

## 2019-08-27 RX ADMIN — DILTIAZEM HYDROCHLORIDE SCH MG: 30 TABLET, FILM COATED ORAL at 17:49

## 2019-08-27 RX ADMIN — MOMETASONE FUROATE 1 PUFF: 220 INHALANT RESPIRATORY (INHALATION) at 21:04

## 2019-08-28 VITALS — RESPIRATION RATE: 16 BRPM | DIASTOLIC BLOOD PRESSURE: 56 MMHG | HEART RATE: 60 BPM | SYSTOLIC BLOOD PRESSURE: 123 MMHG

## 2019-08-28 VITALS — DIASTOLIC BLOOD PRESSURE: 56 MMHG | SYSTOLIC BLOOD PRESSURE: 117 MMHG | HEART RATE: 60 BPM | RESPIRATION RATE: 18 BRPM

## 2019-08-28 VITALS — HEART RATE: 60 BPM | DIASTOLIC BLOOD PRESSURE: 58 MMHG | RESPIRATION RATE: 18 BRPM | SYSTOLIC BLOOD PRESSURE: 124 MMHG

## 2019-08-28 VITALS — HEART RATE: 60 BPM | DIASTOLIC BLOOD PRESSURE: 61 MMHG | SYSTOLIC BLOOD PRESSURE: 126 MMHG | RESPIRATION RATE: 18 BRPM

## 2019-08-28 VITALS — RESPIRATION RATE: 18 BRPM | HEART RATE: 57 BPM | SYSTOLIC BLOOD PRESSURE: 119 MMHG | DIASTOLIC BLOOD PRESSURE: 57 MMHG

## 2019-08-28 VITALS — RESPIRATION RATE: 18 BRPM | DIASTOLIC BLOOD PRESSURE: 66 MMHG | SYSTOLIC BLOOD PRESSURE: 144 MMHG | HEART RATE: 66 BPM

## 2019-08-28 LAB
ALANINE AMINOTRANSFERASE: 24 IU/L (ref 13–69)
ALBUMIN/GLOBULIN RATIO: 1.2
ALBUMIN: 2.9 G/DL (ref 3.3–4.9)
ALKALINE PHOSPHATASE: 66 IU/L (ref 42–121)
ANION GAP: 4 (ref 5–13)
ASPARTATE AMINO TRANSFERASE: 14 IU/L (ref 15–46)
BILIRUBIN,DIRECT: 0 MG/DL (ref 0–0.2)
BILIRUBIN,TOTAL: 0.6 MG/DL (ref 0.2–1.3)
BLOOD UREA NITROGEN: 13 MG/DL (ref 7–20)
CALCIUM: 9.4 MG/DL (ref 8.4–10.2)
CARBON DIOXIDE: 28 MMOL/L (ref 21–31)
CHLORIDE: 106 MMOL/L (ref 97–110)
CREATININE: 0.56 MG/DL (ref 0.44–1)
GLOBULIN: 2.4 G/DL (ref 1.3–3.2)
GLUCOSE: 113 MG/DL (ref 70–220)
INR: 0.92
MAGNESIUM: 1.9 MG/DL (ref 1.7–2.5)
POTASSIUM: 3.8 MMOL/L (ref 3.5–5.1)
PROTIME: 12.5 SEC (ref 11.9–14.9)
PT RATIO: 1
SODIUM: 138 MMOL/L (ref 135–144)
TOTAL PROTEIN: 5.3 G/DL (ref 6.1–8.1)

## 2019-08-28 RX ADMIN — MORPHINE SULFATE PRN MG: 2 INJECTION, SOLUTION INTRAMUSCULAR; INTRAVENOUS at 23:04

## 2019-08-28 RX ADMIN — DILTIAZEM HYDROCHLORIDE 1 MG: 30 TABLET, FILM COATED ORAL at 06:01

## 2019-08-28 RX ADMIN — DILTIAZEM HYDROCHLORIDE SCH MG: 30 TABLET, FILM COATED ORAL at 11:41

## 2019-08-28 RX ADMIN — MONTELUKAST SODIUM 1 MG: 10 TABLET, FILM COATED ORAL at 20:26

## 2019-08-28 RX ADMIN — MORPHINE SULFATE 1 MG: 2 INJECTION, SOLUTION INTRAMUSCULAR; INTRAVENOUS at 03:38

## 2019-08-28 RX ADMIN — SERTRALINE HYDROCHLORIDE 1 MG: 50 TABLET ORAL at 23:03

## 2019-08-28 RX ADMIN — MORPHINE SULFATE PRN MG: 2 INJECTION, SOLUTION INTRAMUSCULAR; INTRAVENOUS at 03:38

## 2019-08-28 RX ADMIN — MORPHINE SULFATE 1 MG: 2 INJECTION, SOLUTION INTRAMUSCULAR; INTRAVENOUS at 23:04

## 2019-08-28 RX ADMIN — DILTIAZEM HYDROCHLORIDE 1 MG: 30 TABLET, FILM COATED ORAL at 17:26

## 2019-08-28 RX ADMIN — SERTRALINE HYDROCHLORIDE SCH MG: 50 TABLET ORAL at 23:03

## 2019-08-28 RX ADMIN — MOMETASONE FUROATE SCH PUFF: 220 INHALANT RESPIRATORY (INHALATION) at 20:26

## 2019-08-28 RX ADMIN — LORATADINE 1 MG: 10 TABLET ORAL at 08:45

## 2019-08-28 RX ADMIN — MORPHINE SULFATE PRN MG: 2 INJECTION, SOLUTION INTRAMUSCULAR; INTRAVENOUS at 17:27

## 2019-08-28 RX ADMIN — DIGOXIN SCH MG: 125 TABLET ORAL at 12:37

## 2019-08-28 RX ADMIN — MORPHINE SULFATE 1 MG: 2 INJECTION, SOLUTION INTRAMUSCULAR; INTRAVENOUS at 09:38

## 2019-08-28 RX ADMIN — DILTIAZEM HYDROCHLORIDE SCH MG: 30 TABLET, FILM COATED ORAL at 06:01

## 2019-08-28 RX ADMIN — DILTIAZEM HYDROCHLORIDE SCH MG: 30 TABLET, FILM COATED ORAL at 00:19

## 2019-08-28 RX ADMIN — DILTIAZEM HYDROCHLORIDE SCH MG: 30 TABLET, FILM COATED ORAL at 17:26

## 2019-08-28 RX ADMIN — ACETAMINOPHEN 1 MG: 325 TABLET, FILM COATED ORAL at 11:50

## 2019-08-28 RX ADMIN — DILTIAZEM HYDROCHLORIDE 1 MG: 30 TABLET, FILM COATED ORAL at 23:03

## 2019-08-28 RX ADMIN — CYCLOSPORINE 1 DROP: 0.5 EMULSION OPHTHALMIC at 08:46

## 2019-08-28 RX ADMIN — DILTIAZEM HYDROCHLORIDE 1 MG: 30 TABLET, FILM COATED ORAL at 00:19

## 2019-08-28 RX ADMIN — DIGOXIN 1 MG: 125 TABLET ORAL at 12:37

## 2019-08-28 RX ADMIN — MOMETASONE FUROATE 1 PUFF: 220 INHALANT RESPIRATORY (INHALATION) at 09:37

## 2019-08-28 RX ADMIN — MORPHINE SULFATE 1 MG: 2 INJECTION, SOLUTION INTRAMUSCULAR; INTRAVENOUS at 17:27

## 2019-08-28 RX ADMIN — LORATADINE SCH MG: 10 TABLET ORAL at 08:45

## 2019-08-28 RX ADMIN — MOMETASONE FUROATE 1 PUFF: 220 INHALANT RESPIRATORY (INHALATION) at 20:26

## 2019-08-28 RX ADMIN — DILTIAZEM HYDROCHLORIDE SCH MG: 30 TABLET, FILM COATED ORAL at 23:03

## 2019-08-28 RX ADMIN — PANTOPRAZOLE SODIUM 1 MG: 40 TABLET, DELAYED RELEASE ORAL at 09:37

## 2019-08-28 RX ADMIN — PANTOPRAZOLE SODIUM SCH MG: 40 TABLET, DELAYED RELEASE ORAL at 09:37

## 2019-08-28 RX ADMIN — WARFARIN SODIUM 1 MG: 5 TABLET ORAL at 17:25

## 2019-08-28 RX ADMIN — MORPHINE SULFATE PRN MG: 2 INJECTION, SOLUTION INTRAMUSCULAR; INTRAVENOUS at 09:38

## 2019-08-28 RX ADMIN — DILTIAZEM HYDROCHLORIDE 1 MG: 30 TABLET, FILM COATED ORAL at 11:41

## 2019-08-28 RX ADMIN — MOMETASONE FUROATE SCH PUFF: 220 INHALANT RESPIRATORY (INHALATION) at 09:37

## 2019-08-28 RX ADMIN — CYCLOSPORINE 1 DROP: 0.5 EMULSION OPHTHALMIC at 20:26

## 2019-08-28 RX ADMIN — MONTELUKAST SODIUM SCH MG: 10 TABLET, FILM COATED ORAL at 20:26

## 2019-08-29 VITALS — SYSTOLIC BLOOD PRESSURE: 153 MMHG | DIASTOLIC BLOOD PRESSURE: 65 MMHG | RESPIRATION RATE: 18 BRPM | HEART RATE: 63 BPM

## 2019-08-29 VITALS — HEART RATE: 60 BPM | RESPIRATION RATE: 18 BRPM | SYSTOLIC BLOOD PRESSURE: 155 MMHG | DIASTOLIC BLOOD PRESSURE: 68 MMHG

## 2019-08-29 VITALS — DIASTOLIC BLOOD PRESSURE: 63 MMHG | RESPIRATION RATE: 18 BRPM | SYSTOLIC BLOOD PRESSURE: 139 MMHG | HEART RATE: 61 BPM

## 2019-08-29 LAB
ADD MAN DIFF?: NO
ADD UMIC: YES
ANION GAP: 3 (ref 5–13)
BASOPHIL #: 0.1 10^3/UL (ref 0–0.1)
BASOPHILS %: 0.5 % (ref 0–2)
BLOOD UREA NITROGEN: 10 MG/DL (ref 7–20)
CALCIUM: 9.4 MG/DL (ref 8.4–10.2)
CARBON DIOXIDE: 30 MMOL/L (ref 21–31)
CHLORIDE: 106 MMOL/L (ref 97–110)
CREATININE: 0.55 MG/DL (ref 0.44–1)
EOSINOPHILS #: 0.2 10^3/UL (ref 0–0.5)
EOSINOPHILS %: 1.6 % (ref 0–7)
GLUCOSE: 92 MG/DL (ref 70–220)
HEMATOCRIT: 32.8 % (ref 37–47)
HEMOGLOBIN: 10.3 G/DL (ref 12–16)
IMMATURE GRANS #M: 0.04 10^3/UL (ref 0–0.03)
IMMATURE GRANS % (M): 0.4 % (ref 0–0.43)
INR: 0.92
LYMPHOCYTES #: 2.3 10^3/UL (ref 0.8–2.9)
LYMPHOCYTES %: 23.3 % (ref 15–51)
MAGNESIUM: 1.9 MG/DL (ref 1.7–2.5)
MEAN CORPUSCULAR HEMOGLOBIN: 29.5 PG (ref 29–33)
MEAN CORPUSCULAR HGB CONC: 31.4 G/DL (ref 32–37)
MEAN CORPUSCULAR VOLUME: 94 FL (ref 82–101)
MEAN PLATELET VOLUME: 10.9 FL (ref 7.4–10.4)
MONOCYTE #: 0.9 10^3/UL (ref 0.3–0.9)
MONOCYTES %: 9.3 % (ref 0–11)
NEUTROPHIL #: 6.5 10^3/UL (ref 1.6–7.5)
NEUTROPHILS %: 64.9 % (ref 39–77)
NUCLEATED RED BLOOD CELLS #: 0 10^3/UL (ref 0–0)
NUCLEATED RED BLOOD CELLS%: 0 /100WBC (ref 0–0)
PLATELET COUNT: 186 10^3/UL (ref 140–415)
POTASSIUM: 3.6 MMOL/L (ref 3.5–5.1)
PROTIME: 12.5 SEC (ref 11.9–14.9)
PT RATIO: 1
RED BLOOD COUNT: 3.49 10^6/UL (ref 4.2–5.4)
RED CELL DISTRIBUTION WIDTH: 14.5 % (ref 11.5–14.5)
SODIUM: 139 MMOL/L (ref 135–144)
UR ASCORBIC ACID: NEGATIVE MG/DL
UR BILIRUBIN (DIP): NEGATIVE MG/DL
UR BLOOD (DIP): (no result) MG/DL
UR CLARITY: CLEAR
UR COLOR: YELLOW
UR GLUCOSE (DIP): NEGATIVE MG/DL
UR KETONES (DIP): NEGATIVE MG/DL
UR LEUKOCYTE ESTERASE (DIP): NEGATIVE LEU/UL
UR NITRITE (DIP): NEGATIVE MG/DL
UR PH (DIP): 6 (ref 5–9)
UR RBC: 1 /HPF (ref 0–5)
UR SPECIFIC GRAVITY (DIP): 1.01 (ref 1–1.03)
UR TOTAL PROTEIN (DIP): NEGATIVE MG/DL
UR UROBILINOGEN (DIP): NEGATIVE MG/DL
UR WBC: 2 /HPF (ref 0–5)
WHITE BLOOD COUNT: 10 10^3/UL (ref 4.8–10.8)

## 2019-08-29 RX ADMIN — DILTIAZEM HYDROCHLORIDE 1 MG: 30 TABLET, FILM COATED ORAL at 23:38

## 2019-08-29 RX ADMIN — PANTOPRAZOLE SODIUM SCH MG: 40 TABLET, DELAYED RELEASE ORAL at 05:26

## 2019-08-29 RX ADMIN — DILTIAZEM HYDROCHLORIDE SCH MG: 30 TABLET, FILM COATED ORAL at 18:10

## 2019-08-29 RX ADMIN — PYRIDOXINE HYDROCHLORIDE 1 MLS/HR: 100 INJECTION, SOLUTION INTRAMUSCULAR; INTRAVENOUS at 09:00

## 2019-08-29 RX ADMIN — PANTOPRAZOLE SODIUM 1 MG: 40 TABLET, DELAYED RELEASE ORAL at 05:26

## 2019-08-29 RX ADMIN — LORATADINE 1 MG: 10 TABLET ORAL at 08:29

## 2019-08-29 RX ADMIN — LORATADINE SCH MG: 10 TABLET ORAL at 08:29

## 2019-08-29 RX ADMIN — DILTIAZEM HYDROCHLORIDE 1 MG: 30 TABLET, FILM COATED ORAL at 05:27

## 2019-08-29 RX ADMIN — DILTIAZEM HYDROCHLORIDE SCH MG: 30 TABLET, FILM COATED ORAL at 13:11

## 2019-08-29 RX ADMIN — MORPHINE SULFATE 1 MG: 2 INJECTION, SOLUTION INTRAMUSCULAR; INTRAVENOUS at 13:11

## 2019-08-29 RX ADMIN — DILTIAZEM HYDROCHLORIDE 1 MG: 30 TABLET, FILM COATED ORAL at 18:10

## 2019-08-29 RX ADMIN — DILTIAZEM HYDROCHLORIDE 1 MG: 30 TABLET, FILM COATED ORAL at 13:11

## 2019-08-29 RX ADMIN — MOMETASONE FUROATE SCH PUFF: 220 INHALANT RESPIRATORY (INHALATION) at 08:30

## 2019-08-29 RX ADMIN — DILTIAZEM HYDROCHLORIDE SCH MG: 30 TABLET, FILM COATED ORAL at 23:38

## 2019-08-29 RX ADMIN — DIGOXIN SCH MG: 125 TABLET ORAL at 13:11

## 2019-08-29 RX ADMIN — MORPHINE SULFATE PRN MG: 2 INJECTION, SOLUTION INTRAMUSCULAR; INTRAVENOUS at 03:47

## 2019-08-29 RX ADMIN — MORPHINE SULFATE PRN MG: 2 INJECTION, SOLUTION INTRAMUSCULAR; INTRAVENOUS at 13:11

## 2019-08-29 RX ADMIN — PYRIDOXINE HYDROCHLORIDE SCH MLS/HR: 100 INJECTION, SOLUTION INTRAMUSCULAR; INTRAVENOUS at 13:17

## 2019-08-29 RX ADMIN — SERTRALINE HYDROCHLORIDE 1 MG: 50 TABLET ORAL at 20:32

## 2019-08-29 RX ADMIN — MONTELUKAST SODIUM 1 MG: 10 TABLET, FILM COATED ORAL at 20:32

## 2019-08-29 RX ADMIN — CYCLOSPORINE 1 DROP: 0.5 EMULSION OPHTHALMIC at 20:33

## 2019-08-29 RX ADMIN — MONTELUKAST SODIUM SCH MG: 10 TABLET, FILM COATED ORAL at 20:32

## 2019-08-29 RX ADMIN — DILTIAZEM HYDROCHLORIDE SCH MG: 30 TABLET, FILM COATED ORAL at 05:27

## 2019-08-29 RX ADMIN — SERTRALINE HYDROCHLORIDE SCH MG: 50 TABLET ORAL at 20:32

## 2019-08-29 RX ADMIN — PYRIDOXINE HYDROCHLORIDE 1 MLS/HR: 100 INJECTION, SOLUTION INTRAMUSCULAR; INTRAVENOUS at 13:17

## 2019-08-29 RX ADMIN — DIGOXIN 1 MG: 125 TABLET ORAL at 13:11

## 2019-08-29 RX ADMIN — WARFARIN SODIUM 1 MG: 5 TABLET ORAL at 18:10

## 2019-08-29 RX ADMIN — MOMETASONE FUROATE 1 PUFF: 220 INHALANT RESPIRATORY (INHALATION) at 20:32

## 2019-08-29 RX ADMIN — PYRIDOXINE HYDROCHLORIDE SCH MLS/HR: 100 INJECTION, SOLUTION INTRAMUSCULAR; INTRAVENOUS at 09:00

## 2019-08-29 RX ADMIN — MOMETASONE FUROATE 1 PUFF: 220 INHALANT RESPIRATORY (INHALATION) at 08:30

## 2019-08-29 RX ADMIN — MORPHINE SULFATE 1 MG: 2 INJECTION, SOLUTION INTRAMUSCULAR; INTRAVENOUS at 03:47

## 2019-08-29 RX ADMIN — MOMETASONE FUROATE SCH PUFF: 220 INHALANT RESPIRATORY (INHALATION) at 20:32

## 2019-08-29 RX ADMIN — CYCLOSPORINE 1 DROP: 0.5 EMULSION OPHTHALMIC at 08:29

## 2019-08-30 VITALS — DIASTOLIC BLOOD PRESSURE: 58 MMHG | SYSTOLIC BLOOD PRESSURE: 123 MMHG | HEART RATE: 60 BPM | RESPIRATION RATE: 18 BRPM

## 2019-08-30 VITALS — DIASTOLIC BLOOD PRESSURE: 60 MMHG | SYSTOLIC BLOOD PRESSURE: 123 MMHG | HEART RATE: 60 BPM | RESPIRATION RATE: 18 BRPM

## 2019-08-30 VITALS — HEART RATE: 61 BPM | DIASTOLIC BLOOD PRESSURE: 65 MMHG | SYSTOLIC BLOOD PRESSURE: 144 MMHG | RESPIRATION RATE: 18 BRPM

## 2019-08-30 VITALS — SYSTOLIC BLOOD PRESSURE: 138 MMHG | HEART RATE: 60 BPM | DIASTOLIC BLOOD PRESSURE: 60 MMHG | RESPIRATION RATE: 18 BRPM

## 2019-08-30 LAB
ADD MAN DIFF?: NO
ANION GAP: 3 (ref 5–13)
BASOPHIL #: 0 10^3/UL (ref 0–0.1)
BASOPHILS %: 0.4 % (ref 0–2)
BLOOD UREA NITROGEN: 10 MG/DL (ref 7–20)
CALCIUM: 9 MG/DL (ref 8.4–10.2)
CARBON DIOXIDE: 27 MMOL/L (ref 21–31)
CHLORIDE: 105 MMOL/L (ref 97–110)
CREATININE: 0.5 MG/DL (ref 0.44–1)
EOSINOPHILS #: 0.2 10^3/UL (ref 0–0.5)
EOSINOPHILS %: 1.9 % (ref 0–7)
GLUCOSE: 87 MG/DL (ref 70–220)
HEMATOCRIT: 31.8 % (ref 37–47)
HEMOGLOBIN: 10 G/DL (ref 12–16)
IMMATURE GRANS #M: 0.03 10^3/UL (ref 0–0.03)
IMMATURE GRANS % (M): 0.4 % (ref 0–0.43)
INR: 1.02
LYMPHOCYTES #: 2 10^3/UL (ref 0.8–2.9)
LYMPHOCYTES %: 24.4 % (ref 15–51)
MEAN CORPUSCULAR HEMOGLOBIN: 29.6 PG (ref 29–33)
MEAN CORPUSCULAR HGB CONC: 31.4 G/DL (ref 32–37)
MEAN CORPUSCULAR VOLUME: 94.1 FL (ref 82–101)
MEAN PLATELET VOLUME: 11 FL (ref 7.4–10.4)
MONOCYTE #: 0.9 10^3/UL (ref 0.3–0.9)
MONOCYTES %: 11.2 % (ref 0–11)
NEUTROPHIL #: 5.1 10^3/UL (ref 1.6–7.5)
NEUTROPHILS %: 61.7 % (ref 39–77)
NUCLEATED RED BLOOD CELLS #: 0 10^3/UL (ref 0–0)
NUCLEATED RED BLOOD CELLS%: 0 /100WBC (ref 0–0)
PLATELET COUNT: 156 10^3/UL (ref 140–415)
POTASSIUM: 3.8 MMOL/L (ref 3.5–5.1)
PROTIME: 13.5 SEC (ref 11.9–14.9)
PT RATIO: 1.1
RED BLOOD COUNT: 3.38 10^6/UL (ref 4.2–5.4)
RED CELL DISTRIBUTION WIDTH: 14.2 % (ref 11.5–14.5)
SODIUM: 135 MMOL/L (ref 135–144)
WHITE BLOOD COUNT: 8.3 10^3/UL (ref 4.8–10.8)

## 2019-08-30 RX ADMIN — DIGOXIN 1 MG: 125 TABLET ORAL at 13:32

## 2019-08-30 RX ADMIN — DILTIAZEM HYDROCHLORIDE 1 MG: 30 TABLET, FILM COATED ORAL at 13:32

## 2019-08-30 RX ADMIN — DILTIAZEM HYDROCHLORIDE SCH MG: 30 TABLET, FILM COATED ORAL at 13:32

## 2019-08-30 RX ADMIN — MONTELUKAST SODIUM SCH MG: 10 TABLET, FILM COATED ORAL at 20:37

## 2019-08-30 RX ADMIN — LORATADINE 1 MG: 10 TABLET ORAL at 09:27

## 2019-08-30 RX ADMIN — ACETAMINOPHEN 1 MG: 325 TABLET, FILM COATED ORAL at 01:18

## 2019-08-30 RX ADMIN — DILTIAZEM HYDROCHLORIDE 1 MG: 30 TABLET, FILM COATED ORAL at 18:39

## 2019-08-30 RX ADMIN — SERTRALINE HYDROCHLORIDE 1 MG: 50 TABLET ORAL at 20:37

## 2019-08-30 RX ADMIN — CIPROFLOXACIN HYDROCHLORIDE SCH MG: 250 TABLET, FILM COATED ORAL at 08:35

## 2019-08-30 RX ADMIN — CIPROFLOXACIN HYDROCHLORIDE 1 MG: 250 TABLET, FILM COATED ORAL at 18:38

## 2019-08-30 RX ADMIN — MOMETASONE FUROATE 1 PUFF: 220 INHALANT RESPIRATORY (INHALATION) at 09:27

## 2019-08-30 RX ADMIN — PANTOPRAZOLE SODIUM SCH MG: 40 TABLET, DELAYED RELEASE ORAL at 05:19

## 2019-08-30 RX ADMIN — ACETAMINOPHEN 1 MG: 325 TABLET, FILM COATED ORAL at 09:28

## 2019-08-30 RX ADMIN — MONTELUKAST SODIUM 1 MG: 10 TABLET, FILM COATED ORAL at 20:37

## 2019-08-30 RX ADMIN — ACETAMINOPHEN 1 MG: 325 TABLET, FILM COATED ORAL at 15:22

## 2019-08-30 RX ADMIN — MOMETASONE FUROATE 1 PUFF: 220 INHALANT RESPIRATORY (INHALATION) at 21:21

## 2019-08-30 RX ADMIN — LORATADINE SCH MG: 10 TABLET ORAL at 09:27

## 2019-08-30 RX ADMIN — DIGOXIN SCH MG: 125 TABLET ORAL at 13:32

## 2019-08-30 RX ADMIN — MAGNESIUM HYDROXIDE 1 ML: 400 SUSPENSION ORAL at 09:27

## 2019-08-30 RX ADMIN — SERTRALINE HYDROCHLORIDE SCH MG: 50 TABLET ORAL at 20:37

## 2019-08-30 RX ADMIN — MOMETASONE FUROATE SCH PUFF: 220 INHALANT RESPIRATORY (INHALATION) at 09:27

## 2019-08-30 RX ADMIN — DILTIAZEM HYDROCHLORIDE SCH MG: 30 TABLET, FILM COATED ORAL at 05:17

## 2019-08-30 RX ADMIN — DILTIAZEM HYDROCHLORIDE SCH MG: 30 TABLET, FILM COATED ORAL at 18:39

## 2019-08-30 RX ADMIN — MOMETASONE FUROATE SCH PUFF: 220 INHALANT RESPIRATORY (INHALATION) at 21:21

## 2019-08-30 RX ADMIN — CYCLOSPORINE 1 DROP: 0.5 EMULSION OPHTHALMIC at 09:00

## 2019-08-30 RX ADMIN — DILTIAZEM HYDROCHLORIDE 1 MG: 30 TABLET, FILM COATED ORAL at 05:17

## 2019-08-30 RX ADMIN — CIPROFLOXACIN HYDROCHLORIDE 1 MG: 250 TABLET, FILM COATED ORAL at 08:35

## 2019-08-30 RX ADMIN — ACETAMINOPHEN 1 MG: 325 TABLET, FILM COATED ORAL at 20:42

## 2019-08-30 RX ADMIN — CIPROFLOXACIN HYDROCHLORIDE SCH MG: 250 TABLET, FILM COATED ORAL at 18:38

## 2019-08-30 RX ADMIN — PANTOPRAZOLE SODIUM 1 MG: 40 TABLET, DELAYED RELEASE ORAL at 05:19

## 2019-08-30 RX ADMIN — WARFARIN SODIUM 1 MG: 5 TABLET ORAL at 18:38

## 2019-08-30 RX ADMIN — CYCLOSPORINE 1 DROP: 0.5 EMULSION OPHTHALMIC at 20:32

## 2019-08-31 VITALS — RESPIRATION RATE: 18 BRPM | DIASTOLIC BLOOD PRESSURE: 60 MMHG | SYSTOLIC BLOOD PRESSURE: 126 MMHG | HEART RATE: 60 BPM

## 2019-08-31 VITALS — SYSTOLIC BLOOD PRESSURE: 142 MMHG | DIASTOLIC BLOOD PRESSURE: 64 MMHG | RESPIRATION RATE: 18 BRPM | HEART RATE: 60 BPM

## 2019-08-31 VITALS — RESPIRATION RATE: 14 BRPM | DIASTOLIC BLOOD PRESSURE: 65 MMHG | SYSTOLIC BLOOD PRESSURE: 157 MMHG | HEART RATE: 61 BPM

## 2019-08-31 LAB
ADD MAN DIFF?: NO
ANION GAP: 3 (ref 5–13)
BASOPHIL #: 0 10^3/UL (ref 0–0.1)
BASOPHILS %: 0.7 % (ref 0–2)
BLOOD UREA NITROGEN: 10 MG/DL (ref 7–20)
CALCIUM: 9.3 MG/DL (ref 8.4–10.2)
CARBON DIOXIDE: 29 MMOL/L (ref 21–31)
CHLORIDE: 107 MMOL/L (ref 97–110)
CREATININE: 0.46 MG/DL (ref 0.44–1)
EOSINOPHILS #: 0.3 10^3/UL (ref 0–0.5)
EOSINOPHILS %: 5.3 % (ref 0–7)
GLUCOSE: 88 MG/DL (ref 70–220)
HEMATOCRIT: 33.5 % (ref 37–47)
HEMOGLOBIN: 10.7 G/DL (ref 12–16)
IMMATURE GRANS #M: 0.02 10^3/UL (ref 0–0.03)
IMMATURE GRANS % (M): 0.3 % (ref 0–0.43)
INR: 1.08
LYMPHOCYTES #: 1.4 10^3/UL (ref 0.8–2.9)
LYMPHOCYTES %: 23.8 % (ref 15–51)
MEAN CORPUSCULAR HEMOGLOBIN: 29.3 PG (ref 29–33)
MEAN CORPUSCULAR HGB CONC: 31.9 G/DL (ref 32–37)
MEAN CORPUSCULAR VOLUME: 91.8 FL (ref 82–101)
MEAN PLATELET VOLUME: 11.1 FL (ref 7.4–10.4)
MONOCYTE #: 0.6 10^3/UL (ref 0.3–0.9)
MONOCYTES %: 10.1 % (ref 0–11)
NEUTROPHIL #: 3.6 10^3/UL (ref 1.6–7.5)
NEUTROPHILS %: 59.8 % (ref 39–77)
NUCLEATED RED BLOOD CELLS #: 0 10^3/UL (ref 0–0)
NUCLEATED RED BLOOD CELLS%: 0 /100WBC (ref 0–0)
PLATELET COUNT: 179 10^3/UL (ref 140–415)
POTASSIUM: 4.3 MMOL/L (ref 3.5–5.1)
PROTIME: 14.1 SEC (ref 11.9–14.9)
PT RATIO: 1.1
RED BLOOD COUNT: 3.65 10^6/UL (ref 4.2–5.4)
RED CELL DISTRIBUTION WIDTH: 14.1 % (ref 11.5–14.5)
SODIUM: 139 MMOL/L (ref 135–144)
WHITE BLOOD COUNT: 6.1 10^3/UL (ref 4.8–10.8)

## 2019-08-31 RX ADMIN — DILTIAZEM HYDROCHLORIDE SCH MG: 30 TABLET, FILM COATED ORAL at 17:35

## 2019-08-31 RX ADMIN — CIPROFLOXACIN HYDROCHLORIDE 1 MG: 250 TABLET, FILM COATED ORAL at 17:35

## 2019-08-31 RX ADMIN — DILTIAZEM HYDROCHLORIDE 1 MG: 30 TABLET, FILM COATED ORAL at 23:01

## 2019-08-31 RX ADMIN — DILTIAZEM HYDROCHLORIDE 1 MG: 30 TABLET, FILM COATED ORAL at 12:34

## 2019-08-31 RX ADMIN — ACETAMINOPHEN 1 MG: 500 TABLET, FILM COATED ORAL at 13:34

## 2019-08-31 RX ADMIN — CIPROFLOXACIN HYDROCHLORIDE 1 MG: 250 TABLET, FILM COATED ORAL at 05:37

## 2019-08-31 RX ADMIN — CIPROFLOXACIN HYDROCHLORIDE SCH MG: 250 TABLET, FILM COATED ORAL at 05:37

## 2019-08-31 RX ADMIN — MONTELUKAST SODIUM 1 MG: 10 TABLET, FILM COATED ORAL at 21:13

## 2019-08-31 RX ADMIN — PANTOPRAZOLE SODIUM SCH MG: 40 TABLET, DELAYED RELEASE ORAL at 05:39

## 2019-08-31 RX ADMIN — DILTIAZEM HYDROCHLORIDE SCH MG: 30 TABLET, FILM COATED ORAL at 12:34

## 2019-08-31 RX ADMIN — DILTIAZEM HYDROCHLORIDE SCH MG: 30 TABLET, FILM COATED ORAL at 23:01

## 2019-08-31 RX ADMIN — WARFARIN SODIUM 1 MG: 5 TABLET ORAL at 17:35

## 2019-08-31 RX ADMIN — CYCLOSPORINE 1 DROP: 0.5 EMULSION OPHTHALMIC at 21:13

## 2019-08-31 RX ADMIN — PANTOPRAZOLE SODIUM 1 MG: 40 TABLET, DELAYED RELEASE ORAL at 05:39

## 2019-08-31 RX ADMIN — PYRIDOXINE HYDROCHLORIDE SCH MLS/HR: 100 INJECTION, SOLUTION INTRAMUSCULAR; INTRAVENOUS at 09:00

## 2019-08-31 RX ADMIN — LORATADINE SCH MG: 10 TABLET ORAL at 09:10

## 2019-08-31 RX ADMIN — MONTELUKAST SODIUM SCH MG: 10 TABLET, FILM COATED ORAL at 21:13

## 2019-08-31 RX ADMIN — DILTIAZEM HYDROCHLORIDE 1 MG: 30 TABLET, FILM COATED ORAL at 00:00

## 2019-08-31 RX ADMIN — MOMETASONE FUROATE 1 PUFF: 220 INHALANT RESPIRATORY (INHALATION) at 09:10

## 2019-08-31 RX ADMIN — CIPROFLOXACIN HYDROCHLORIDE SCH MG: 250 TABLET, FILM COATED ORAL at 17:35

## 2019-08-31 RX ADMIN — MAGNESIUM HYDROXIDE 1 ML: 400 SUSPENSION ORAL at 12:31

## 2019-08-31 RX ADMIN — DILTIAZEM HYDROCHLORIDE SCH MG: 30 TABLET, FILM COATED ORAL at 00:00

## 2019-08-31 RX ADMIN — PYRIDOXINE HYDROCHLORIDE 1 MLS/HR: 100 INJECTION, SOLUTION INTRAMUSCULAR; INTRAVENOUS at 09:00

## 2019-08-31 RX ADMIN — LORATADINE 1 MG: 10 TABLET ORAL at 09:10

## 2019-08-31 RX ADMIN — DIGOXIN SCH MG: 125 TABLET ORAL at 12:35

## 2019-08-31 RX ADMIN — DILTIAZEM HYDROCHLORIDE 1 MG: 30 TABLET, FILM COATED ORAL at 05:37

## 2019-08-31 RX ADMIN — DILTIAZEM HYDROCHLORIDE SCH MG: 30 TABLET, FILM COATED ORAL at 05:37

## 2019-08-31 RX ADMIN — MOMETASONE FUROATE SCH PUFF: 220 INHALANT RESPIRATORY (INHALATION) at 09:10

## 2019-08-31 RX ADMIN — DIGOXIN 1 MG: 125 TABLET ORAL at 12:35

## 2019-08-31 RX ADMIN — MOMETASONE FUROATE SCH PUFF: 220 INHALANT RESPIRATORY (INHALATION) at 21:14

## 2019-08-31 RX ADMIN — CYCLOSPORINE 1 DROP: 0.5 EMULSION OPHTHALMIC at 09:10

## 2019-08-31 RX ADMIN — MOMETASONE FUROATE 1 PUFF: 220 INHALANT RESPIRATORY (INHALATION) at 21:14

## 2019-08-31 RX ADMIN — SERTRALINE HYDROCHLORIDE SCH MG: 50 TABLET ORAL at 21:13

## 2019-08-31 RX ADMIN — DILTIAZEM HYDROCHLORIDE 1 MG: 30 TABLET, FILM COATED ORAL at 17:35

## 2019-08-31 RX ADMIN — SERTRALINE HYDROCHLORIDE 1 MG: 50 TABLET ORAL at 21:13

## 2019-09-01 VITALS — HEART RATE: 65 BPM | SYSTOLIC BLOOD PRESSURE: 143 MMHG | RESPIRATION RATE: 18 BRPM | DIASTOLIC BLOOD PRESSURE: 65 MMHG

## 2019-09-01 VITALS — SYSTOLIC BLOOD PRESSURE: 143 MMHG | HEART RATE: 60 BPM | DIASTOLIC BLOOD PRESSURE: 66 MMHG | RESPIRATION RATE: 17 BRPM

## 2019-09-01 VITALS — SYSTOLIC BLOOD PRESSURE: 139 MMHG | RESPIRATION RATE: 18 BRPM | DIASTOLIC BLOOD PRESSURE: 60 MMHG | HEART RATE: 60 BPM

## 2019-09-01 VITALS — SYSTOLIC BLOOD PRESSURE: 144 MMHG | RESPIRATION RATE: 18 BRPM | HEART RATE: 60 BPM | DIASTOLIC BLOOD PRESSURE: 61 MMHG

## 2019-09-01 VITALS — HEART RATE: 62 BPM | SYSTOLIC BLOOD PRESSURE: 134 MMHG | DIASTOLIC BLOOD PRESSURE: 70 MMHG | RESPIRATION RATE: 16 BRPM

## 2019-09-01 RX ADMIN — LORATADINE 1 MG: 10 TABLET ORAL at 08:23

## 2019-09-01 RX ADMIN — DILTIAZEM HYDROCHLORIDE 1 MG: 30 TABLET, FILM COATED ORAL at 12:55

## 2019-09-01 RX ADMIN — MONTELUKAST SODIUM 1 MG: 10 TABLET, FILM COATED ORAL at 20:31

## 2019-09-01 RX ADMIN — PANTOPRAZOLE SODIUM 1 MG: 40 TABLET, DELAYED RELEASE ORAL at 20:40

## 2019-09-01 RX ADMIN — DILTIAZEM HYDROCHLORIDE 1 MG: 30 TABLET, FILM COATED ORAL at 05:23

## 2019-09-01 RX ADMIN — LORATADINE SCH MG: 10 TABLET ORAL at 08:23

## 2019-09-01 RX ADMIN — MOMETASONE FUROATE SCH PUFF: 220 INHALANT RESPIRATORY (INHALATION) at 08:23

## 2019-09-01 RX ADMIN — MONTELUKAST SODIUM SCH MG: 10 TABLET, FILM COATED ORAL at 20:31

## 2019-09-01 RX ADMIN — CIPROFLOXACIN HYDROCHLORIDE SCH MG: 250 TABLET, FILM COATED ORAL at 17:50

## 2019-09-01 RX ADMIN — CIPROFLOXACIN HYDROCHLORIDE 1 MG: 250 TABLET, FILM COATED ORAL at 17:50

## 2019-09-01 RX ADMIN — PYRIDOXINE HYDROCHLORIDE SCH MLS/HR: 100 INJECTION, SOLUTION INTRAMUSCULAR; INTRAVENOUS at 08:21

## 2019-09-01 RX ADMIN — DILTIAZEM HYDROCHLORIDE SCH MG: 30 TABLET, FILM COATED ORAL at 05:23

## 2019-09-01 RX ADMIN — ACETAMINOPHEN 1 MG: 500 TABLET, FILM COATED ORAL at 00:58

## 2019-09-01 RX ADMIN — CYCLOSPORINE 1 DROP: 0.5 EMULSION OPHTHALMIC at 08:23

## 2019-09-01 RX ADMIN — SERTRALINE HYDROCHLORIDE 1 MG: 50 TABLET ORAL at 20:31

## 2019-09-01 RX ADMIN — CIPROFLOXACIN HYDROCHLORIDE 1 MG: 250 TABLET, FILM COATED ORAL at 05:23

## 2019-09-01 RX ADMIN — DIGOXIN 1 MG: 125 TABLET ORAL at 12:54

## 2019-09-01 RX ADMIN — ACETAMINOPHEN 1 MG: 500 TABLET, FILM COATED ORAL at 09:23

## 2019-09-01 RX ADMIN — WARFARIN SODIUM 1 MG: 5 TABLET ORAL at 17:50

## 2019-09-01 RX ADMIN — ZOLPIDEM TARTRATE 1 MG: 5 TABLET, FILM COATED ORAL at 23:31

## 2019-09-01 RX ADMIN — SERTRALINE HYDROCHLORIDE SCH MG: 50 TABLET ORAL at 20:31

## 2019-09-01 RX ADMIN — CYCLOSPORINE 1 DROP: 0.5 EMULSION OPHTHALMIC at 20:34

## 2019-09-01 RX ADMIN — MOMETASONE FUROATE 1 PUFF: 220 INHALANT RESPIRATORY (INHALATION) at 08:23

## 2019-09-01 RX ADMIN — DILTIAZEM HYDROCHLORIDE 1 MG: 30 TABLET, FILM COATED ORAL at 17:51

## 2019-09-01 RX ADMIN — DILTIAZEM HYDROCHLORIDE SCH MG: 30 TABLET, FILM COATED ORAL at 23:35

## 2019-09-01 RX ADMIN — CIPROFLOXACIN HYDROCHLORIDE SCH MG: 250 TABLET, FILM COATED ORAL at 05:23

## 2019-09-01 RX ADMIN — PYRIDOXINE HYDROCHLORIDE 1 MLS/HR: 100 INJECTION, SOLUTION INTRAMUSCULAR; INTRAVENOUS at 08:21

## 2019-09-01 RX ADMIN — DILTIAZEM HYDROCHLORIDE 1 MG: 30 TABLET, FILM COATED ORAL at 23:35

## 2019-09-01 RX ADMIN — MOMETASONE FUROATE 1 PUFF: 220 INHALANT RESPIRATORY (INHALATION) at 20:31

## 2019-09-01 RX ADMIN — DILTIAZEM HYDROCHLORIDE SCH MG: 30 TABLET, FILM COATED ORAL at 17:51

## 2019-09-01 RX ADMIN — PANTOPRAZOLE SODIUM SCH MG: 40 TABLET, DELAYED RELEASE ORAL at 20:40

## 2019-09-01 RX ADMIN — DIGOXIN SCH MG: 125 TABLET ORAL at 12:54

## 2019-09-01 RX ADMIN — PANTOPRAZOLE SODIUM SCH MG: 40 TABLET, DELAYED RELEASE ORAL at 05:23

## 2019-09-01 RX ADMIN — DILTIAZEM HYDROCHLORIDE SCH MG: 30 TABLET, FILM COATED ORAL at 12:55

## 2019-09-01 RX ADMIN — PANTOPRAZOLE SODIUM 1 MG: 40 TABLET, DELAYED RELEASE ORAL at 05:23

## 2019-09-01 RX ADMIN — MOMETASONE FUROATE SCH PUFF: 220 INHALANT RESPIRATORY (INHALATION) at 20:31

## 2019-09-02 VITALS — HEART RATE: 66 BPM | DIASTOLIC BLOOD PRESSURE: 68 MMHG | RESPIRATION RATE: 18 BRPM | SYSTOLIC BLOOD PRESSURE: 135 MMHG

## 2019-09-02 VITALS — SYSTOLIC BLOOD PRESSURE: 118 MMHG | RESPIRATION RATE: 18 BRPM | HEART RATE: 60 BPM | DIASTOLIC BLOOD PRESSURE: 58 MMHG

## 2019-09-02 VITALS — SYSTOLIC BLOOD PRESSURE: 131 MMHG | DIASTOLIC BLOOD PRESSURE: 67 MMHG | RESPIRATION RATE: 18 BRPM | HEART RATE: 62 BPM

## 2019-09-02 VITALS — SYSTOLIC BLOOD PRESSURE: 146 MMHG | DIASTOLIC BLOOD PRESSURE: 63 MMHG | HEART RATE: 60 BPM | RESPIRATION RATE: 18 BRPM

## 2019-09-02 VITALS — RESPIRATION RATE: 18 BRPM | SYSTOLIC BLOOD PRESSURE: 130 MMHG | DIASTOLIC BLOOD PRESSURE: 60 MMHG | HEART RATE: 60 BPM

## 2019-09-02 LAB
INR: 1.38
PROTIME: 17.1 SEC (ref 11.9–14.9)
PT RATIO: 1.3

## 2019-09-02 RX ADMIN — CIPROFLOXACIN HYDROCHLORIDE SCH MG: 250 TABLET, FILM COATED ORAL at 17:28

## 2019-09-02 RX ADMIN — MOMETASONE FUROATE 1 PUFF: 220 INHALANT RESPIRATORY (INHALATION) at 20:03

## 2019-09-02 RX ADMIN — CYCLOSPORINE 1 DROP: 0.5 EMULSION OPHTHALMIC at 08:47

## 2019-09-02 RX ADMIN — DILTIAZEM HYDROCHLORIDE SCH MG: 30 TABLET, FILM COATED ORAL at 13:11

## 2019-09-02 RX ADMIN — PANTOPRAZOLE SODIUM SCH MG: 40 TABLET, DELAYED RELEASE ORAL at 20:16

## 2019-09-02 RX ADMIN — DILTIAZEM HYDROCHLORIDE SCH MG: 30 TABLET, FILM COATED ORAL at 17:29

## 2019-09-02 RX ADMIN — LORATADINE 1 MG: 10 TABLET ORAL at 08:51

## 2019-09-02 RX ADMIN — DILTIAZEM HYDROCHLORIDE 1 MG: 30 TABLET, FILM COATED ORAL at 13:11

## 2019-09-02 RX ADMIN — CIPROFLOXACIN HYDROCHLORIDE 1 MG: 250 TABLET, FILM COATED ORAL at 06:45

## 2019-09-02 RX ADMIN — DILTIAZEM HYDROCHLORIDE SCH MG: 30 TABLET, FILM COATED ORAL at 06:47

## 2019-09-02 RX ADMIN — MONTELUKAST SODIUM SCH MG: 10 TABLET, FILM COATED ORAL at 20:03

## 2019-09-02 RX ADMIN — CIPROFLOXACIN HYDROCHLORIDE SCH MG: 250 TABLET, FILM COATED ORAL at 06:45

## 2019-09-02 RX ADMIN — WARFARIN SODIUM 1 MG: 5 TABLET ORAL at 17:28

## 2019-09-02 RX ADMIN — SERTRALINE HYDROCHLORIDE 1 MG: 50 TABLET ORAL at 20:03

## 2019-09-02 RX ADMIN — MOMETASONE FUROATE 1 PUFF: 220 INHALANT RESPIRATORY (INHALATION) at 08:51

## 2019-09-02 RX ADMIN — MONTELUKAST SODIUM 1 MG: 10 TABLET, FILM COATED ORAL at 20:03

## 2019-09-02 RX ADMIN — CYCLOSPORINE 1 DROP: 0.5 EMULSION OPHTHALMIC at 20:04

## 2019-09-02 RX ADMIN — PANTOPRAZOLE SODIUM 1 MG: 40 TABLET, DELAYED RELEASE ORAL at 20:16

## 2019-09-02 RX ADMIN — SERTRALINE HYDROCHLORIDE SCH MG: 50 TABLET ORAL at 20:03

## 2019-09-02 RX ADMIN — DILTIAZEM HYDROCHLORIDE 1 MG: 30 TABLET, FILM COATED ORAL at 06:47

## 2019-09-02 RX ADMIN — DOCUSATE SODIUM SCH MG: 100 CAPSULE, LIQUID FILLED ORAL at 20:03

## 2019-09-02 RX ADMIN — LORATADINE SCH MG: 10 TABLET ORAL at 08:51

## 2019-09-02 RX ADMIN — MOMETASONE FUROATE SCH PUFF: 220 INHALANT RESPIRATORY (INHALATION) at 08:51

## 2019-09-02 RX ADMIN — CIPROFLOXACIN HYDROCHLORIDE 1 MG: 250 TABLET, FILM COATED ORAL at 17:28

## 2019-09-02 RX ADMIN — DILTIAZEM HYDROCHLORIDE 1 MG: 30 TABLET, FILM COATED ORAL at 17:29

## 2019-09-02 RX ADMIN — MOMETASONE FUROATE SCH PUFF: 220 INHALANT RESPIRATORY (INHALATION) at 20:03

## 2019-09-02 RX ADMIN — DIGOXIN SCH MG: 125 TABLET ORAL at 13:11

## 2019-09-02 RX ADMIN — DOCUSATE SODIUM 1 MG: 100 CAPSULE, LIQUID FILLED ORAL at 20:03

## 2019-09-02 RX ADMIN — DIGOXIN 1 MG: 125 TABLET ORAL at 13:11

## 2019-09-02 RX ADMIN — ACETAMINOPHEN 1 MG: 500 TABLET, FILM COATED ORAL at 15:34

## 2019-09-03 VITALS — HEART RATE: 64 BPM | RESPIRATION RATE: 19 BRPM | DIASTOLIC BLOOD PRESSURE: 54 MMHG | SYSTOLIC BLOOD PRESSURE: 121 MMHG

## 2019-09-03 VITALS — DIASTOLIC BLOOD PRESSURE: 62 MMHG | HEART RATE: 68 BPM | SYSTOLIC BLOOD PRESSURE: 124 MMHG | RESPIRATION RATE: 19 BRPM

## 2019-09-03 VITALS — SYSTOLIC BLOOD PRESSURE: 144 MMHG | DIASTOLIC BLOOD PRESSURE: 66 MMHG | RESPIRATION RATE: 18 BRPM | HEART RATE: 65 BPM

## 2019-09-03 VITALS — RESPIRATION RATE: 18 BRPM | HEART RATE: 59 BPM | SYSTOLIC BLOOD PRESSURE: 127 MMHG | DIASTOLIC BLOOD PRESSURE: 60 MMHG

## 2019-09-03 LAB
ADD MAN DIFF?: NO
ALANINE AMINOTRANSFERASE: 19 IU/L (ref 13–69)
ALBUMIN/GLOBULIN RATIO: 1.28
ALBUMIN: 3.2 G/DL (ref 3.3–4.9)
ALKALINE PHOSPHATASE: 57 IU/L (ref 42–121)
ANION GAP: 3 (ref 5–13)
ASPARTATE AMINO TRANSFERASE: 15 IU/L (ref 15–46)
BASOPHIL #: 0 10^3/UL (ref 0–0.1)
BASOPHILS %: 0.6 % (ref 0–2)
BILIRUBIN,DIRECT: 0 MG/DL (ref 0–0.2)
BILIRUBIN,TOTAL: 0.2 MG/DL (ref 0.2–1.3)
BLOOD UREA NITROGEN: 12 MG/DL (ref 7–20)
CALCIUM: 9.4 MG/DL (ref 8.4–10.2)
CARBON DIOXIDE: 25 MMOL/L (ref 21–31)
CHLORIDE: 110 MMOL/L (ref 97–110)
CREATININE: 0.52 MG/DL (ref 0.44–1)
EOSINOPHILS #: 0.4 10^3/UL (ref 0–0.5)
EOSINOPHILS %: 5.7 % (ref 0–7)
GLOBULIN: 2.5 G/DL (ref 1.3–3.2)
GLUCOSE: 84 MG/DL (ref 70–220)
HEMATOCRIT: 32.3 % (ref 37–47)
HEMOGLOBIN: 10.2 G/DL (ref 12–16)
IMMATURE GRANS #M: 0.03 10^3/UL (ref 0–0.03)
IMMATURE GRANS % (M): 0.4 % (ref 0–0.43)
LYMPHOCYTES #: 1.8 10^3/UL (ref 0.8–2.9)
LYMPHOCYTES %: 27.2 % (ref 15–51)
MEAN CORPUSCULAR HEMOGLOBIN: 28.9 PG (ref 29–33)
MEAN CORPUSCULAR HGB CONC: 31.6 G/DL (ref 32–37)
MEAN CORPUSCULAR VOLUME: 91.5 FL (ref 82–101)
MEAN PLATELET VOLUME: 10.9 FL (ref 7.4–10.4)
MONOCYTE #: 0.6 10^3/UL (ref 0.3–0.9)
MONOCYTES %: 9 % (ref 0–11)
NEUTROPHIL #: 3.8 10^3/UL (ref 1.6–7.5)
NEUTROPHILS %: 57.1 % (ref 39–77)
NUCLEATED RED BLOOD CELLS #: 0 10^3/UL (ref 0–0)
NUCLEATED RED BLOOD CELLS%: 0 /100WBC (ref 0–0)
PLATELET COUNT: 208 10^3/UL (ref 140–415)
POTASSIUM: 3.9 MMOL/L (ref 3.5–5.1)
RED BLOOD COUNT: 3.53 10^6/UL (ref 4.2–5.4)
RED CELL DISTRIBUTION WIDTH: 14.4 % (ref 11.5–14.5)
SODIUM: 138 MMOL/L (ref 135–144)
TOTAL PROTEIN: 5.7 G/DL (ref 6.1–8.1)
WHITE BLOOD COUNT: 6.7 10^3/UL (ref 4.8–10.8)

## 2019-09-03 RX ADMIN — LORATADINE SCH MG: 10 TABLET ORAL at 08:36

## 2019-09-03 RX ADMIN — DILTIAZEM HYDROCHLORIDE SCH MG: 30 TABLET, FILM COATED ORAL at 23:16

## 2019-09-03 RX ADMIN — LORATADINE 1 MG: 10 TABLET ORAL at 08:36

## 2019-09-03 RX ADMIN — MONTELUKAST SODIUM SCH MG: 10 TABLET, FILM COATED ORAL at 20:57

## 2019-09-03 RX ADMIN — DOCUSATE SODIUM SCH MG: 100 CAPSULE, LIQUID FILLED ORAL at 20:57

## 2019-09-03 RX ADMIN — DILTIAZEM HYDROCHLORIDE 1 MG: 30 TABLET, FILM COATED ORAL at 17:37

## 2019-09-03 RX ADMIN — CIPROFLOXACIN HYDROCHLORIDE 1 MG: 250 TABLET, FILM COATED ORAL at 17:37

## 2019-09-03 RX ADMIN — WARFARIN SODIUM 1 MG: 5 TABLET ORAL at 17:37

## 2019-09-03 RX ADMIN — DILTIAZEM HYDROCHLORIDE 1 MG: 30 TABLET, FILM COATED ORAL at 12:49

## 2019-09-03 RX ADMIN — DOCUSATE SODIUM 1 MG: 100 CAPSULE, LIQUID FILLED ORAL at 20:57

## 2019-09-03 RX ADMIN — DILTIAZEM HYDROCHLORIDE 1 MG: 30 TABLET, FILM COATED ORAL at 00:00

## 2019-09-03 RX ADMIN — CYCLOSPORINE 1 DROP: 0.5 EMULSION OPHTHALMIC at 08:29

## 2019-09-03 RX ADMIN — DILTIAZEM HYDROCHLORIDE SCH MG: 30 TABLET, FILM COATED ORAL at 12:49

## 2019-09-03 RX ADMIN — DIGOXIN SCH MG: 125 TABLET ORAL at 12:49

## 2019-09-03 RX ADMIN — CIPROFLOXACIN HYDROCHLORIDE 1 MG: 250 TABLET, FILM COATED ORAL at 05:41

## 2019-09-03 RX ADMIN — DILTIAZEM HYDROCHLORIDE SCH MG: 30 TABLET, FILM COATED ORAL at 05:42

## 2019-09-03 RX ADMIN — CYCLOSPORINE 1 DROP: 0.5 EMULSION OPHTHALMIC at 20:55

## 2019-09-03 RX ADMIN — MOMETASONE FUROATE SCH PUFF: 220 INHALANT RESPIRATORY (INHALATION) at 20:56

## 2019-09-03 RX ADMIN — SERTRALINE HYDROCHLORIDE SCH MG: 50 TABLET ORAL at 20:58

## 2019-09-03 RX ADMIN — MOMETASONE FUROATE 1 PUFF: 220 INHALANT RESPIRATORY (INHALATION) at 20:56

## 2019-09-03 RX ADMIN — DIGOXIN 1 MG: 125 TABLET ORAL at 12:49

## 2019-09-03 RX ADMIN — MOMETASONE FUROATE 1 PUFF: 220 INHALANT RESPIRATORY (INHALATION) at 08:36

## 2019-09-03 RX ADMIN — DILTIAZEM HYDROCHLORIDE SCH MG: 30 TABLET, FILM COATED ORAL at 00:00

## 2019-09-03 RX ADMIN — MONTELUKAST SODIUM 1 MG: 10 TABLET, FILM COATED ORAL at 20:57

## 2019-09-03 RX ADMIN — DILTIAZEM HYDROCHLORIDE 1 MG: 30 TABLET, FILM COATED ORAL at 23:16

## 2019-09-03 RX ADMIN — MOMETASONE FUROATE SCH PUFF: 220 INHALANT RESPIRATORY (INHALATION) at 08:36

## 2019-09-03 RX ADMIN — DILTIAZEM HYDROCHLORIDE 1 MG: 30 TABLET, FILM COATED ORAL at 05:42

## 2019-09-03 RX ADMIN — DILTIAZEM HYDROCHLORIDE SCH MG: 30 TABLET, FILM COATED ORAL at 17:37

## 2019-09-03 RX ADMIN — SERTRALINE HYDROCHLORIDE 1 MG: 50 TABLET ORAL at 20:58

## 2019-09-03 RX ADMIN — CIPROFLOXACIN HYDROCHLORIDE SCH MG: 250 TABLET, FILM COATED ORAL at 17:37

## 2019-09-03 RX ADMIN — CIPROFLOXACIN HYDROCHLORIDE SCH MG: 250 TABLET, FILM COATED ORAL at 05:41

## 2019-09-04 VITALS — SYSTOLIC BLOOD PRESSURE: 141 MMHG | DIASTOLIC BLOOD PRESSURE: 64 MMHG | RESPIRATION RATE: 19 BRPM | HEART RATE: 60 BPM

## 2019-09-04 VITALS — SYSTOLIC BLOOD PRESSURE: 142 MMHG | DIASTOLIC BLOOD PRESSURE: 74 MMHG | HEART RATE: 61 BPM | RESPIRATION RATE: 20 BRPM

## 2019-09-04 VITALS — SYSTOLIC BLOOD PRESSURE: 132 MMHG | RESPIRATION RATE: 18 BRPM | HEART RATE: 67 BPM | DIASTOLIC BLOOD PRESSURE: 68 MMHG

## 2019-09-04 LAB
ADD MAN DIFF?: NO
ANION GAP: 5 (ref 5–13)
BASOPHIL #: 0.1 10^3/UL (ref 0–0.1)
BASOPHILS %: 0.9 % (ref 0–2)
BLOOD UREA NITROGEN: 13 MG/DL (ref 7–20)
CALCIUM: 9.5 MG/DL (ref 8.4–10.2)
CARBON DIOXIDE: 25 MMOL/L (ref 21–31)
CHLORIDE: 108 MMOL/L (ref 97–110)
CREATININE: 0.55 MG/DL (ref 0.44–1)
EOSINOPHILS #: 0.3 10^3/UL (ref 0–0.5)
EOSINOPHILS %: 4.8 % (ref 0–7)
GLUCOSE: 87 MG/DL (ref 70–220)
HEMATOCRIT: 32.3 % (ref 37–47)
HEMOGLOBIN: 10.1 G/DL (ref 12–16)
IMMATURE GRANS #M: 0.05 10^3/UL (ref 0–0.03)
IMMATURE GRANS % (M): 0.7 % (ref 0–0.43)
INR: 1.44
LYMPHOCYTES #: 1.9 10^3/UL (ref 0.8–2.9)
LYMPHOCYTES %: 28.2 % (ref 15–51)
MEAN CORPUSCULAR HEMOGLOBIN: 28.7 PG (ref 29–33)
MEAN CORPUSCULAR HGB CONC: 31.3 G/DL (ref 32–37)
MEAN CORPUSCULAR VOLUME: 91.8 FL (ref 82–101)
MEAN PLATELET VOLUME: 10.7 FL (ref 7.4–10.4)
MONOCYTE #: 0.6 10^3/UL (ref 0.3–0.9)
MONOCYTES %: 8.9 % (ref 0–11)
NEUTROPHIL #: 3.9 10^3/UL (ref 1.6–7.5)
NEUTROPHILS %: 56.5 % (ref 39–77)
NUCLEATED RED BLOOD CELLS #: 0 10^3/UL (ref 0–0)
NUCLEATED RED BLOOD CELLS%: 0 /100WBC (ref 0–0)
PLATELET COUNT: 214 10^3/UL (ref 140–415)
POTASSIUM: 4.1 MMOL/L (ref 3.5–5.1)
PROTIME: 17.6 SEC (ref 11.9–14.9)
PT RATIO: 1.4
RED BLOOD COUNT: 3.52 10^6/UL (ref 4.2–5.4)
RED CELL DISTRIBUTION WIDTH: 14.3 % (ref 11.5–14.5)
SODIUM: 138 MMOL/L (ref 135–144)
WHITE BLOOD COUNT: 6.9 10^3/UL (ref 4.8–10.8)

## 2019-09-04 RX ADMIN — DILTIAZEM HYDROCHLORIDE 1 MG: 30 TABLET, FILM COATED ORAL at 13:42

## 2019-09-04 RX ADMIN — DILTIAZEM HYDROCHLORIDE 1 MG: 30 TABLET, FILM COATED ORAL at 05:04

## 2019-09-04 RX ADMIN — PANTOPRAZOLE SODIUM 1 MG: 40 TABLET, DELAYED RELEASE ORAL at 05:03

## 2019-09-04 RX ADMIN — LORATADINE 1 MG: 10 TABLET ORAL at 08:43

## 2019-09-04 RX ADMIN — CIPROFLOXACIN HYDROCHLORIDE 1 MG: 250 TABLET, FILM COATED ORAL at 05:03

## 2019-09-04 RX ADMIN — DILTIAZEM HYDROCHLORIDE SCH MG: 30 TABLET, FILM COATED ORAL at 13:42

## 2019-09-04 RX ADMIN — MOMETASONE FUROATE SCH PUFF: 220 INHALANT RESPIRATORY (INHALATION) at 08:42

## 2019-09-04 RX ADMIN — MOMETASONE FUROATE 1 PUFF: 220 INHALANT RESPIRATORY (INHALATION) at 08:42

## 2019-09-04 RX ADMIN — PANTOPRAZOLE SODIUM SCH MG: 40 TABLET, DELAYED RELEASE ORAL at 05:03

## 2019-09-04 RX ADMIN — LORATADINE SCH MG: 10 TABLET ORAL at 08:43

## 2019-09-04 RX ADMIN — CYCLOSPORINE 1 DROP: 0.5 EMULSION OPHTHALMIC at 08:44

## 2019-09-04 RX ADMIN — ACETAMINOPHEN 1 MG: 500 TABLET, FILM COATED ORAL at 04:53

## 2019-09-04 RX ADMIN — DILTIAZEM HYDROCHLORIDE SCH MG: 30 TABLET, FILM COATED ORAL at 05:04

## 2019-09-04 RX ADMIN — DIGOXIN SCH MG: 125 TABLET ORAL at 13:42

## 2019-09-04 RX ADMIN — CIPROFLOXACIN HYDROCHLORIDE SCH MG: 250 TABLET, FILM COATED ORAL at 05:03

## 2019-09-04 RX ADMIN — DIGOXIN 1 MG: 125 TABLET ORAL at 13:42
